# Patient Record
Sex: FEMALE | Race: BLACK OR AFRICAN AMERICAN | NOT HISPANIC OR LATINO | Employment: OTHER | URBAN - METROPOLITAN AREA
[De-identification: names, ages, dates, MRNs, and addresses within clinical notes are randomized per-mention and may not be internally consistent; named-entity substitution may affect disease eponyms.]

---

## 2018-06-04 ENCOUNTER — HOSPITAL ENCOUNTER (EMERGENCY)
Facility: HOSPITAL | Age: 53
Discharge: HOME | End: 2018-06-04
Attending: EMERGENCY MEDICINE | Admitting: EMERGENCY MEDICINE

## 2018-06-04 ENCOUNTER — APPOINTMENT (OUTPATIENT)
Dept: GENERAL RADIOLOGY | Facility: HOSPITAL | Age: 53
End: 2018-06-04

## 2018-06-04 VITALS
BODY MASS INDEX: 27.55 KG/M2 | HEART RATE: 86 BPM | WEIGHT: 186 LBS | TEMPERATURE: 98.7 F | RESPIRATION RATE: 18 BRPM | SYSTOLIC BLOOD PRESSURE: 170 MMHG | OXYGEN SATURATION: 97 % | DIASTOLIC BLOOD PRESSURE: 98 MMHG | HEIGHT: 69 IN

## 2018-06-04 DIAGNOSIS — I10 ESSENTIAL HYPERTENSION: Primary | ICD-10-CM

## 2018-06-04 DIAGNOSIS — F43.9 STRESS: ICD-10-CM

## 2018-06-04 DIAGNOSIS — R73.9 HYPERGLYCEMIA: ICD-10-CM

## 2018-06-04 LAB
ALBUMIN SERPL-MCNC: 4.2 G/DL (ref 3.2–4.8)
ALBUMIN/GLOB SERPL: 1.3 G/DL (ref 1.5–2.5)
ALP SERPL-CCNC: 113 U/L (ref 25–100)
ALT SERPL W P-5'-P-CCNC: 50 U/L (ref 7–40)
ANION GAP SERPL CALCULATED.3IONS-SCNC: 9 MMOL/L (ref 3–11)
AST SERPL-CCNC: 40 U/L (ref 0–33)
BACTERIA UR QL AUTO: NORMAL /HPF
BASOPHILS # BLD AUTO: 0.03 10*3/MM3 (ref 0–0.2)
BASOPHILS NFR BLD AUTO: 0.3 % (ref 0–1)
BILIRUB SERPL-MCNC: 0.8 MG/DL (ref 0.3–1.2)
BILIRUB UR QL STRIP: NEGATIVE
BUN BLD-MCNC: 8 MG/DL (ref 9–23)
BUN/CREAT SERPL: 11.4 (ref 7–25)
CALCIUM SPEC-SCNC: 9.2 MG/DL (ref 8.7–10.4)
CHLORIDE SERPL-SCNC: 100 MMOL/L (ref 99–109)
CLARITY UR: ABNORMAL
CO2 SERPL-SCNC: 28 MMOL/L (ref 20–31)
COLOR UR: YELLOW
CREAT BLD-MCNC: 0.7 MG/DL (ref 0.6–1.3)
DEPRECATED RDW RBC AUTO: 41.5 FL (ref 37–54)
EOSINOPHIL # BLD AUTO: 0.02 10*3/MM3 (ref 0–0.3)
EOSINOPHIL NFR BLD AUTO: 0.2 % (ref 0–3)
ERYTHROCYTE [DISTWIDTH] IN BLOOD BY AUTOMATED COUNT: 14.5 % (ref 11.3–14.5)
GFR SERPL CREATININE-BSD FRML MDRD: 106 ML/MIN/1.73
GLOBULIN UR ELPH-MCNC: 3.3 GM/DL
GLUCOSE BLD-MCNC: 327 MG/DL (ref 70–100)
GLUCOSE UR STRIP-MCNC: ABNORMAL MG/DL
HCT VFR BLD AUTO: 40.6 % (ref 34.5–44)
HGB BLD-MCNC: 12.6 G/DL (ref 11.5–15.5)
HGB UR QL STRIP.AUTO: NEGATIVE
HYALINE CASTS UR QL AUTO: NORMAL /LPF
IMM GRANULOCYTES # BLD: 0.02 10*3/MM3 (ref 0–0.03)
IMM GRANULOCYTES NFR BLD: 0.2 % (ref 0–0.6)
KETONES UR QL STRIP: NEGATIVE
LEUKOCYTE ESTERASE UR QL STRIP.AUTO: NEGATIVE
LYMPHOCYTES # BLD AUTO: 1.19 10*3/MM3 (ref 0.6–4.8)
LYMPHOCYTES NFR BLD AUTO: 12.3 % (ref 24–44)
MCH RBC QN AUTO: 24.7 PG (ref 27–31)
MCHC RBC AUTO-ENTMCNC: 31 G/DL (ref 32–36)
MCV RBC AUTO: 79.5 FL (ref 80–99)
MONOCYTES # BLD AUTO: 0.41 10*3/MM3 (ref 0–1)
MONOCYTES NFR BLD AUTO: 4.3 % (ref 0–12)
NEUTROPHILS # BLD AUTO: 7.99 10*3/MM3 (ref 1.5–8.3)
NEUTROPHILS NFR BLD AUTO: 82.9 % (ref 41–71)
NITRITE UR QL STRIP: NEGATIVE
PH UR STRIP.AUTO: 7 [PH] (ref 5–8)
PLATELET # BLD AUTO: 293 10*3/MM3 (ref 150–450)
PMV BLD AUTO: 11.1 FL (ref 6–12)
POTASSIUM BLD-SCNC: 4 MMOL/L (ref 3.5–5.5)
PROT SERPL-MCNC: 7.5 G/DL (ref 5.7–8.2)
PROT UR QL STRIP: NEGATIVE
RBC # BLD AUTO: 5.11 10*6/MM3 (ref 3.89–5.14)
RBC # UR: NORMAL /HPF
REF LAB TEST METHOD: NORMAL
SODIUM BLD-SCNC: 137 MMOL/L (ref 132–146)
SP GR UR STRIP: 1.01 (ref 1–1.03)
SQUAMOUS #/AREA URNS HPF: NORMAL /HPF
TROPONIN I SERPL-MCNC: 0 NG/ML (ref 0–0.07)
UROBILINOGEN UR QL STRIP: ABNORMAL
WBC NRBC COR # BLD: 9.64 10*3/MM3 (ref 3.5–10.8)
WBC UR QL AUTO: NORMAL /HPF

## 2018-06-04 PROCEDURE — 71045 X-RAY EXAM CHEST 1 VIEW: CPT

## 2018-06-04 PROCEDURE — 99284 EMERGENCY DEPT VISIT MOD MDM: CPT

## 2018-06-04 PROCEDURE — 84484 ASSAY OF TROPONIN QUANT: CPT

## 2018-06-04 PROCEDURE — 93005 ELECTROCARDIOGRAM TRACING: CPT | Performed by: EMERGENCY MEDICINE

## 2018-06-04 PROCEDURE — 85025 COMPLETE CBC W/AUTO DIFF WBC: CPT | Performed by: EMERGENCY MEDICINE

## 2018-06-04 PROCEDURE — 81001 URINALYSIS AUTO W/SCOPE: CPT | Performed by: EMERGENCY MEDICINE

## 2018-06-04 PROCEDURE — 80053 COMPREHEN METABOLIC PANEL: CPT | Performed by: EMERGENCY MEDICINE

## 2018-06-04 RX ORDER — AMLODIPINE BESYLATE 10 MG/1
10 TABLET ORAL DAILY
Qty: 14 TABLET | Refills: 0 | Status: SHIPPED | OUTPATIENT
Start: 2018-06-04 | End: 2018-06-15 | Stop reason: SDUPTHER

## 2018-06-04 NOTE — ED PROVIDER NOTES
"Subjective   Michelle Weaver is a 53 y.o.female who presents to the ED with complaints of hypertension since yesterday. The patient has past medical history of hypertension, and she reports that the last time she was normally taking blood pressure medication was 8-9 months ago. She no longer takes blood pressure medication due to significant weight loss which has been maintained, as well as improvements in diet and exercise. She reports that the last time she checked her blood pressure prior to yesterday was about 3 months ago, and it measured 138/80. The patient reports that yesterday she experienced a burning and tingling sensation in her face, which was resolved yesterday, and \"room-spinning\" dizziness when bending over, which was partially resolved with sitting down. She reports that when she woke up this morning, she once again experienced the same sensation in her face. Her daughter then measured her blood pressure, which was 184/104. She took 10 mg of Amlodipine at 0830 today from her previous prescription, which did not relieve her symptoms. The patient reports that she has been experiencing a lot of family stress recently and occasionally has panic attacks. Her anxiety is un-medicated.        History provided by:  Patient  Hypertension   Severity:  Moderate  Onset quality:  Sudden  Duration:  1 day  Timing:  Constant  Progression:  Unchanged  Chronicity:  Chronic (Used to take BP medication 8-9 months ago, but was stopped due to maintained weight loss and improved diet and exercise.)  Time since last dose of antihypertensive:  2 hours  Notable PTA blood pressures:  184/104  Context: stress    Relieved by:  Nothing  Worsened by:  Nothing  Ineffective treatments:  Ca channel blockers  Associated symptoms: dizziness    Associated symptoms: no abdominal pain, no chest pain, no fever and no weakness    Associated symptoms comment:  Burning sensation in her face    Risk factors comment:  Prior history of " hypertension      Review of Systems   Constitutional: Negative for chills and fever.   Cardiovascular: Negative for chest pain.        Hypertension   Gastrointestinal: Negative for abdominal pain.   Musculoskeletal: Negative for back pain.   Neurological: Positive for dizziness. Negative for weakness.        Burning and tingling sensation in her face   Psychiatric/Behavioral:        She reports lots of recent stress   All other systems reviewed and are negative.      History reviewed. No pertinent past medical history.    Allergies   Allergen Reactions   • Sulfa Antibiotics Rash       History reviewed. No pertinent surgical history.    History reviewed. No pertinent family history.    Social History     Social History   • Marital status:      Social History Main Topics   • Smoking status: Never Smoker   • Alcohol use No   • Drug use: No     Other Topics Concern   • Not on file         Objective   Physical Exam   Constitutional: She is oriented to person, place, and time. She appears well-developed and well-nourished. No distress.   HENT:   Head: Normocephalic and atraumatic.   Eyes: Conjunctivae are normal. No scleral icterus.   Neck: Normal range of motion. Neck supple.   Cardiovascular: Normal rate, regular rhythm and normal heart sounds.    No murmur heard.  Pulmonary/Chest: Effort normal and breath sounds normal. No respiratory distress.   Abdominal: Soft. Bowel sounds are normal. There is no tenderness.   Musculoskeletal: Normal range of motion. She exhibits no edema or tenderness.   No lower extremity edema. Equal calf size.   Neurological: She is alert and oriented to person, place, and time. No cranial nerve deficit.   Cranial nerves normal and intact. Non-focal neuro exam.   Skin: Skin is warm and dry.   Psychiatric: She has a normal mood and affect. Her behavior is normal.   Nursing note and vitals reviewed.      Procedures         ED Course     Recent Results (from the past 24 hour(s))    Comprehensive Metabolic Panel    Collection Time: 06/04/18 11:12 AM   Result Value Ref Range    Glucose 327 (H) 70 - 100 mg/dL    BUN 8 (L) 9 - 23 mg/dL    Creatinine 0.70 0.60 - 1.30 mg/dL    Sodium 137 132 - 146 mmol/L    Potassium 4.0 3.5 - 5.5 mmol/L    Chloride 100 99 - 109 mmol/L    CO2 28.0 20.0 - 31.0 mmol/L    Calcium 9.2 8.7 - 10.4 mg/dL    Total Protein 7.5 5.7 - 8.2 g/dL    Albumin 4.20 3.20 - 4.80 g/dL    ALT (SGPT) 50 (H) 7 - 40 U/L    AST (SGOT) 40 (H) 0 - 33 U/L    Alkaline Phosphatase 113 (H) 25 - 100 U/L    Total Bilirubin 0.8 0.3 - 1.2 mg/dL    eGFR  African Amer 106 >60 mL/min/1.73    Globulin 3.3 gm/dL    A/G Ratio 1.3 (L) 1.5 - 2.5 g/dL    BUN/Creatinine Ratio 11.4 7.0 - 25.0    Anion Gap 9.0 3.0 - 11.0 mmol/L   CBC Auto Differential    Collection Time: 06/04/18 11:12 AM   Result Value Ref Range    WBC 9.64 3.50 - 10.80 10*3/mm3    RBC 5.11 3.89 - 5.14 10*6/mm3    Hemoglobin 12.6 11.5 - 15.5 g/dL    Hematocrit 40.6 34.5 - 44.0 %    MCV 79.5 (L) 80.0 - 99.0 fL    MCH 24.7 (L) 27.0 - 31.0 pg    MCHC 31.0 (L) 32.0 - 36.0 g/dL    RDW 14.5 11.3 - 14.5 %    RDW-SD 41.5 37.0 - 54.0 fl    MPV 11.1 6.0 - 12.0 fL    Platelets 293 150 - 450 10*3/mm3    Neutrophil % 82.9 (H) 41.0 - 71.0 %    Lymphocyte % 12.3 (L) 24.0 - 44.0 %    Monocyte % 4.3 0.0 - 12.0 %    Eosinophil % 0.2 0.0 - 3.0 %    Basophil % 0.3 0.0 - 1.0 %    Immature Grans % 0.2 0.0 - 0.6 %    Neutrophils, Absolute 7.99 1.50 - 8.30 10*3/mm3    Lymphocytes, Absolute 1.19 0.60 - 4.80 10*3/mm3    Monocytes, Absolute 0.41 0.00 - 1.00 10*3/mm3    Eosinophils, Absolute 0.02 0.00 - 0.30 10*3/mm3    Basophils, Absolute 0.03 0.00 - 0.20 10*3/mm3    Immature Grans, Absolute 0.02 0.00 - 0.03 10*3/mm3   POC Troponin, Rapid    Collection Time: 06/04/18 11:22 AM   Result Value Ref Range    Troponin I 0.00 0.00 - 0.07 ng/mL   Urinalysis With / Microscopic If Indicated (No Culture) - Urine, Clean Catch    Collection Time: 06/04/18 12:00 PM   Result Value  Ref Range    Color, UA Yellow Yellow, Straw    Appearance, UA Cloudy (A) Clear    pH, UA 7.0 5.0 - 8.0    Specific Gravity, UA 1.011 1.001 - 1.030    Glucose, UA >=1000 mg/dL (3+) (A) Negative    Ketones, UA Negative Negative    Bilirubin, UA Negative Negative    Blood, UA Negative Negative    Protein, UA Negative Negative    Leuk Esterase, UA Negative Negative    Nitrite, UA Negative Negative    Urobilinogen, UA 0.2 E.U./dL 0.2 - 1.0 E.U./dL   Urinalysis, Microscopic Only - Urine, Clean Catch    Collection Time: 06/04/18 12:00 PM   Result Value Ref Range    RBC, UA 0-2 None Seen, 0-2 /HPF    WBC, UA 0-2 None Seen, 0-2 /HPF    Bacteria, UA None Seen None Seen, Trace /HPF    Squamous Epithelial Cells, UA 0-2 None Seen, 0-2 /HPF    Hyaline Casts, UA None Seen 0 - 6 /LPF    Methodology Automated Microscopy      Note: In addition to lab results from this visit, the labs listed above may include labs taken at another facility or during a different encounter within the last 24 hours. Please correlate lab times with ED admission and discharge times for further clarification of the services performed during this visit.    XR Chest 1 View   Preliminary Result   No acute cardiopulmonary disease.       D:  06/04/2018   E:  06/04/2018                Vitals:    06/04/18 0940 06/04/18 0945 06/04/18 1110 06/04/18 1145   BP: (!) 185/115 (!) 190/94 162/97 170/98   BP Location:       Patient Position:       Pulse:       Resp:       Temp:       TempSrc:       SpO2: 98% 96% 99% 97%   Weight:       Height:         Medications - No data to display  ECG/EMG Results (last 24 hours)     Procedure Component Value Units Date/Time    ECG 12 Lead [[id number]] Collected:  06/04/18 1105     Updated:  06/04/18 1108                        MDM  Number of Diagnoses or Management Options  Essential hypertension: new and requires workup  Hyperglycemia: new and requires workup  Stress: new and requires workup  Diagnosis management comments: Symptoms of  dizziness and facial tingling are resolved upon arrival here, and remained resolved.  Patient has a normal intact neurologic exam, no deficits present.    Does admit to a known history of hypertension, however has not been taking medications for 8 or 9 months, and does admit to significantly increased stressors recently.  She feels this is contributing to her symptoms and her elevated blood pressure.  She also admits to having right coat syndrome in the past.     was observed here in ER, with the medication she took prior to arrival blood pressure has continued to improve.  She has remained astigmatic and well-appearing.    There is no signs of end organ damage on the patient's laboratory EKG or imaging evaluation.    Patient does have high blood sugar on evaluation, we'll initiate the patient on a prescription for amlodipine which she has previous taken, and a prescription for metformin.    Patient will be referred and advised to obtain primary care follow-up within the next week.       Amount and/or Complexity of Data Reviewed  Clinical lab tests: ordered and reviewed  Tests in the radiology section of CPT®: ordered and reviewed  Obtain history from someone other than the patient: yes  Review and summarize past medical records: yes  Independent visualization of images, tracings, or specimens: yes    Patient Progress  Patient progress: stable      Final diagnoses:   Essential hypertension   Hyperglycemia   Stress       Documentation assistance provided by ramses Hernandez.  Information recorded by the scribe was done at my direction and has been verified and validated by me.     Naveed Hernandez  06/04/18 1225       Eliud Singh  06/04/18 1233       Shola Ulloa MD  06/04/18 7102

## 2018-06-04 NOTE — DISCHARGE INSTRUCTIONS
Follow up with one of the [facility] physician groups below to setup primary care. If you have trouble following up, please call Rabia Lozoya, our transitional care nurse, at [phone].    (Dr. Thomason, Dr. Lee, Dr. Gonzalez, and Dr. Jordan.)  [facility], Primary Care, [phone], [address], [place] [address]    [facility], Primary Care, [phone], [address], [address] [place], [address] [facility]) [facility], Primary Care, [phone], [address], [address] [place], [address] [facility], Primary Care, [phone], [address], [address] [place], [address]     [place] [address] [facility], Primary Care, [phone], [address], [address] [place], [address]    [place] [address] [facility], Primary Care, [phone], [address]    [place], [address] [facility], Primary Care, [phone], [address], [address] [place], [address] [facility], Primary Care, [phone], [address], [address] [place], [address] [facility], Primary Care, [phone], [address] [place], [address] [facility], Primary Care, [phone], [address], [address] [place], [address] [facility], Primary Care, [phone], [address], [place], [address]     [facility], Primary Care, [phone], [address] [id number]

## 2019-01-03 RX ORDER — AMLODIPINE BESYLATE 10 MG/1
10 TABLET ORAL DAILY
Qty: 30 TABLET | Refills: 0 | Status: SHIPPED | OUTPATIENT
Start: 2019-01-03 | End: 2022-09-08 | Stop reason: HOSPADM

## 2022-09-05 ENCOUNTER — APPOINTMENT (OUTPATIENT)
Dept: GENERAL RADIOLOGY | Facility: HOSPITAL | Age: 57
End: 2022-09-05

## 2022-09-05 ENCOUNTER — HOSPITAL ENCOUNTER (INPATIENT)
Facility: HOSPITAL | Age: 57
LOS: 3 days | Discharge: HOME | End: 2022-09-08
Attending: EMERGENCY MEDICINE | Admitting: INTERNAL MEDICINE

## 2022-09-05 DIAGNOSIS — L02.416 ABSCESS OF LEFT LEG: ICD-10-CM

## 2022-09-05 DIAGNOSIS — L03.116 CELLULITIS OF LEFT LEG: Primary | ICD-10-CM

## 2022-09-05 DIAGNOSIS — L97.529 SKIN ULCER OF LEFT GREAT TOE, UNSPECIFIED ULCER STAGE: ICD-10-CM

## 2022-09-05 DIAGNOSIS — R73.9 HYPERGLYCEMIA: ICD-10-CM

## 2022-09-05 PROBLEM — I10 PRIMARY HYPERTENSION: Chronic | Status: ACTIVE | Noted: 2022-09-05

## 2022-09-05 PROBLEM — L97.509 TOE ULCER: Status: ACTIVE | Noted: 2022-09-05

## 2022-09-05 PROBLEM — F41.9 ANXIETY: Chronic | Status: ACTIVE | Noted: 2022-09-05

## 2022-09-05 PROBLEM — F41.0 PANIC ATTACKS: Chronic | Status: ACTIVE | Noted: 2022-09-05

## 2022-09-05 LAB
ALBUMIN SERPL-MCNC: 3.6 G/DL (ref 3.5–5.2)
ALBUMIN/GLOB SERPL: 0.8 G/DL
ALP SERPL-CCNC: 114 U/L (ref 39–117)
ALT SERPL W P-5'-P-CCNC: 9 U/L (ref 1–33)
ANION GAP SERPL CALCULATED.3IONS-SCNC: 14 MMOL/L (ref 5–15)
AST SERPL-CCNC: 14 U/L (ref 1–32)
BASOPHILS # BLD AUTO: 0.06 10*3/MM3 (ref 0–0.2)
BASOPHILS NFR BLD AUTO: 0.5 % (ref 0–1.5)
BILIRUB SERPL-MCNC: 0.9 MG/DL (ref 0–1.2)
BUN SERPL-MCNC: 16 MG/DL (ref 6–20)
BUN/CREAT SERPL: 16 (ref 7–25)
CALCIUM SPEC-SCNC: 9.5 MG/DL (ref 8.6–10.5)
CHLORIDE SERPL-SCNC: 82 MMOL/L (ref 98–107)
CO2 SERPL-SCNC: 26 MMOL/L (ref 22–29)
CREAT SERPL-MCNC: 1 MG/DL (ref 0.57–1)
CRP SERPL-MCNC: 15.86 MG/DL (ref 0–0.5)
D-LACTATE SERPL-SCNC: 1.7 MMOL/L (ref 0.5–2)
DEPRECATED RDW RBC AUTO: 37.4 FL (ref 37–54)
EGFRCR SERPLBLD CKD-EPI 2021: 65.4 ML/MIN/1.73
EOSINOPHIL # BLD AUTO: 0.19 10*3/MM3 (ref 0–0.4)
EOSINOPHIL NFR BLD AUTO: 1.4 % (ref 0.3–6.2)
ERYTHROCYTE [DISTWIDTH] IN BLOOD BY AUTOMATED COUNT: 12.5 % (ref 12.3–15.4)
ERYTHROCYTE [SEDIMENTATION RATE] IN BLOOD: 103 MM/HR (ref 0–30)
GLOBULIN UR ELPH-MCNC: 4.7 GM/DL
GLUCOSE BLDC GLUCOMTR-MCNC: 364 MG/DL (ref 70–130)
GLUCOSE SERPL-MCNC: 334 MG/DL (ref 65–99)
HBA1C MFR BLD: 13.5 % (ref 4.8–5.6)
HCT VFR BLD AUTO: 39.9 % (ref 34–46.6)
HGB BLD-MCNC: 13.5 G/DL (ref 12–15.9)
IMM GRANULOCYTES # BLD AUTO: 0.17 10*3/MM3 (ref 0–0.05)
IMM GRANULOCYTES NFR BLD AUTO: 1.3 % (ref 0–0.5)
LYMPHOCYTES # BLD AUTO: 2.77 10*3/MM3 (ref 0.7–3.1)
LYMPHOCYTES NFR BLD AUTO: 20.8 % (ref 19.6–45.3)
MCH RBC QN AUTO: 27.9 PG (ref 26.6–33)
MCHC RBC AUTO-ENTMCNC: 33.8 G/DL (ref 31.5–35.7)
MCV RBC AUTO: 82.4 FL (ref 79–97)
MONOCYTES # BLD AUTO: 0.72 10*3/MM3 (ref 0.1–0.9)
MONOCYTES NFR BLD AUTO: 5.4 % (ref 5–12)
NEUTROPHILS NFR BLD AUTO: 70.6 % (ref 42.7–76)
NEUTROPHILS NFR BLD AUTO: 9.42 10*3/MM3 (ref 1.7–7)
NRBC BLD AUTO-RTO: 0 /100 WBC (ref 0–0.2)
PLATELET # BLD AUTO: 416 10*3/MM3 (ref 140–450)
PMV BLD AUTO: 11.2 FL (ref 6–12)
POTASSIUM SERPL-SCNC: 3.5 MMOL/L (ref 3.5–5.2)
PROCALCITONIN SERPL-MCNC: 0.14 NG/ML (ref 0–0.25)
PROT SERPL-MCNC: 8.3 G/DL (ref 6–8.5)
RBC # BLD AUTO: 4.84 10*6/MM3 (ref 3.77–5.28)
SODIUM SERPL-SCNC: 122 MMOL/L (ref 136–145)
VANCOMYCIN SERPL-MCNC: 25.4 MCG/ML (ref 5–40)
WBC NRBC COR # BLD: 13.33 10*3/MM3 (ref 3.4–10.8)

## 2022-09-05 PROCEDURE — 73630 X-RAY EXAM OF FOOT: CPT

## 2022-09-05 PROCEDURE — 80202 ASSAY OF VANCOMYCIN: CPT

## 2022-09-05 PROCEDURE — 87040 BLOOD CULTURE FOR BACTERIA: CPT | Performed by: NURSE PRACTITIONER

## 2022-09-05 PROCEDURE — 25010000002 PIPERACILLIN SOD-TAZOBACTAM PER 1 G: Performed by: NURSE PRACTITIONER

## 2022-09-05 PROCEDURE — 87147 CULTURE TYPE IMMUNOLOGIC: CPT | Performed by: NURSE PRACTITIONER

## 2022-09-05 PROCEDURE — 85025 COMPLETE CBC W/AUTO DIFF WBC: CPT | Performed by: NURSE PRACTITIONER

## 2022-09-05 PROCEDURE — 82962 GLUCOSE BLOOD TEST: CPT

## 2022-09-05 PROCEDURE — 99223 1ST HOSP IP/OBS HIGH 75: CPT | Performed by: INTERNAL MEDICINE

## 2022-09-05 PROCEDURE — 25010000002 HYDROMORPHONE PER 4 MG: Performed by: EMERGENCY MEDICINE

## 2022-09-05 PROCEDURE — 87186 SC STD MICRODIL/AGAR DIL: CPT | Performed by: NURSE PRACTITIONER

## 2022-09-05 PROCEDURE — 80053 COMPREHEN METABOLIC PANEL: CPT | Performed by: NURSE PRACTITIONER

## 2022-09-05 PROCEDURE — 86140 C-REACTIVE PROTEIN: CPT | Performed by: INTERNAL MEDICINE

## 2022-09-05 PROCEDURE — 85652 RBC SED RATE AUTOMATED: CPT | Performed by: INTERNAL MEDICINE

## 2022-09-05 PROCEDURE — 73590 X-RAY EXAM OF LOWER LEG: CPT

## 2022-09-05 PROCEDURE — 87205 SMEAR GRAM STAIN: CPT | Performed by: NURSE PRACTITIONER

## 2022-09-05 PROCEDURE — 0H9LXZZ DRAINAGE OF LEFT LOWER LEG SKIN, EXTERNAL APPROACH: ICD-10-PCS | Performed by: EMERGENCY MEDICINE

## 2022-09-05 PROCEDURE — 87077 CULTURE AEROBIC IDENTIFY: CPT | Performed by: NURSE PRACTITIONER

## 2022-09-05 PROCEDURE — 25010000002 VANCOMYCIN 10 G RECONSTITUTED SOLUTION: Performed by: NURSE PRACTITIONER

## 2022-09-05 PROCEDURE — 25010000002 ENOXAPARIN PER 10 MG: Performed by: NURSE PRACTITIONER

## 2022-09-05 PROCEDURE — 99284 EMERGENCY DEPT VISIT MOD MDM: CPT

## 2022-09-05 PROCEDURE — 83605 ASSAY OF LACTIC ACID: CPT | Performed by: NURSE PRACTITIONER

## 2022-09-05 PROCEDURE — 25010000002 ONDANSETRON PER 1 MG: Performed by: EMERGENCY MEDICINE

## 2022-09-05 PROCEDURE — 84145 PROCALCITONIN (PCT): CPT | Performed by: NURSE PRACTITIONER

## 2022-09-05 PROCEDURE — 87070 CULTURE OTHR SPECIMN AEROBIC: CPT | Performed by: NURSE PRACTITIONER

## 2022-09-05 PROCEDURE — 63710000001 INSULIN DETEMIR PER 5 UNITS: Performed by: INTERNAL MEDICINE

## 2022-09-05 PROCEDURE — 83036 HEMOGLOBIN GLYCOSYLATED A1C: CPT | Performed by: INTERNAL MEDICINE

## 2022-09-05 RX ORDER — BISACODYL 10 MG
10 SUPPOSITORY, RECTAL RECTAL DAILY PRN
Status: DISCONTINUED | OUTPATIENT
Start: 2022-09-05 | End: 2022-09-08 | Stop reason: HOSPADM

## 2022-09-05 RX ORDER — NICOTINE POLACRILEX 4 MG
15 LOZENGE BUCCAL
Status: DISCONTINUED | OUTPATIENT
Start: 2022-09-05 | End: 2022-09-06 | Stop reason: SDUPTHER

## 2022-09-05 RX ORDER — SODIUM CHLORIDE 0.9 % (FLUSH) 0.9 %
10 SYRINGE (ML) INJECTION AS NEEDED
Status: DISCONTINUED | OUTPATIENT
Start: 2022-09-05 | End: 2022-09-08 | Stop reason: HOSPADM

## 2022-09-05 RX ORDER — BISACODYL 5 MG/1
5 TABLET, DELAYED RELEASE ORAL DAILY PRN
Status: DISCONTINUED | OUTPATIENT
Start: 2022-09-05 | End: 2022-09-08 | Stop reason: HOSPADM

## 2022-09-05 RX ORDER — AMOXICILLIN 250 MG
2 CAPSULE ORAL 2 TIMES DAILY
Status: DISCONTINUED | OUTPATIENT
Start: 2022-09-05 | End: 2022-09-08 | Stop reason: HOSPADM

## 2022-09-05 RX ORDER — ENOXAPARIN SODIUM 100 MG/ML
40 INJECTION SUBCUTANEOUS DAILY
Status: DISCONTINUED | OUTPATIENT
Start: 2022-09-05 | End: 2022-09-08 | Stop reason: HOSPADM

## 2022-09-05 RX ORDER — SODIUM CHLORIDE 0.9 % (FLUSH) 0.9 %
10 SYRINGE (ML) INJECTION EVERY 12 HOURS SCHEDULED
Status: DISCONTINUED | OUTPATIENT
Start: 2022-09-05 | End: 2022-09-08 | Stop reason: HOSPADM

## 2022-09-05 RX ORDER — SODIUM CHLORIDE 9 MG/ML
100 INJECTION, SOLUTION INTRAVENOUS CONTINUOUS
Status: DISCONTINUED | OUTPATIENT
Start: 2022-09-05 | End: 2022-09-06

## 2022-09-05 RX ORDER — ACETAMINOPHEN 650 MG/1
650 SUPPOSITORY RECTAL EVERY 4 HOURS PRN
Status: DISCONTINUED | OUTPATIENT
Start: 2022-09-05 | End: 2022-09-08 | Stop reason: HOSPADM

## 2022-09-05 RX ORDER — NALOXONE HCL 0.4 MG/ML
0.4 VIAL (ML) INJECTION
Status: DISCONTINUED | OUTPATIENT
Start: 2022-09-05 | End: 2022-09-08 | Stop reason: HOSPADM

## 2022-09-05 RX ORDER — ONDANSETRON 2 MG/ML
4 INJECTION INTRAMUSCULAR; INTRAVENOUS ONCE
Status: COMPLETED | OUTPATIENT
Start: 2022-09-05 | End: 2022-09-05

## 2022-09-05 RX ORDER — INSULIN LISPRO 100 [IU]/ML
0-7 INJECTION, SOLUTION INTRAVENOUS; SUBCUTANEOUS
Status: DISCONTINUED | OUTPATIENT
Start: 2022-09-06 | End: 2022-09-06

## 2022-09-05 RX ORDER — POLYETHYLENE GLYCOL 3350 17 G/17G
17 POWDER, FOR SOLUTION ORAL DAILY PRN
Status: DISCONTINUED | OUTPATIENT
Start: 2022-09-05 | End: 2022-09-08 | Stop reason: HOSPADM

## 2022-09-05 RX ORDER — HYDROCODONE BITARTRATE AND ACETAMINOPHEN 5; 325 MG/1; MG/1
1 TABLET ORAL EVERY 4 HOURS PRN
Status: DISCONTINUED | OUTPATIENT
Start: 2022-09-05 | End: 2022-09-08 | Stop reason: HOSPADM

## 2022-09-05 RX ORDER — DEXTROSE MONOHYDRATE 25 G/50ML
25 INJECTION, SOLUTION INTRAVENOUS
Status: DISCONTINUED | OUTPATIENT
Start: 2022-09-05 | End: 2022-09-06 | Stop reason: SDUPTHER

## 2022-09-05 RX ORDER — HYDROMORPHONE HYDROCHLORIDE 1 MG/ML
0.5 INJECTION, SOLUTION INTRAMUSCULAR; INTRAVENOUS; SUBCUTANEOUS
Status: DISCONTINUED | OUTPATIENT
Start: 2022-09-05 | End: 2022-09-08 | Stop reason: HOSPADM

## 2022-09-05 RX ORDER — ACETAMINOPHEN 160 MG/5ML
650 SOLUTION ORAL EVERY 4 HOURS PRN
Status: DISCONTINUED | OUTPATIENT
Start: 2022-09-05 | End: 2022-09-08 | Stop reason: HOSPADM

## 2022-09-05 RX ORDER — ACETAMINOPHEN 325 MG/1
650 TABLET ORAL EVERY 4 HOURS PRN
Status: DISCONTINUED | OUTPATIENT
Start: 2022-09-05 | End: 2022-09-08 | Stop reason: HOSPADM

## 2022-09-05 RX ORDER — HYDROMORPHONE HYDROCHLORIDE 1 MG/ML
0.5 INJECTION, SOLUTION INTRAMUSCULAR; INTRAVENOUS; SUBCUTANEOUS ONCE
Status: COMPLETED | OUTPATIENT
Start: 2022-09-05 | End: 2022-09-05

## 2022-09-05 RX ADMIN — Medication 3 ML: at 15:38

## 2022-09-05 RX ADMIN — TAZOBACTAM SODIUM AND PIPERACILLIN SODIUM 3.38 G: 375; 3 INJECTION, SOLUTION INTRAVENOUS at 22:42

## 2022-09-05 RX ADMIN — ONDANSETRON 4 MG: 2 INJECTION INTRAMUSCULAR; INTRAVENOUS at 15:20

## 2022-09-05 RX ADMIN — HYDROMORPHONE HYDROCHLORIDE 0.5 MG: 1 INJECTION, SOLUTION INTRAMUSCULAR; INTRAVENOUS; SUBCUTANEOUS at 15:20

## 2022-09-05 RX ADMIN — TAZOBACTAM SODIUM AND PIPERACILLIN SODIUM 3.38 G: 375; 3 INJECTION, SOLUTION INTRAVENOUS at 15:30

## 2022-09-05 RX ADMIN — ENOXAPARIN SODIUM 40 MG: 40 INJECTION SUBCUTANEOUS at 21:59

## 2022-09-05 RX ADMIN — INSULIN DETEMIR 5 UNITS: 100 INJECTION, SOLUTION SUBCUTANEOUS at 21:57

## 2022-09-05 RX ADMIN — SODIUM CHLORIDE 100 ML/HR: 9 INJECTION, SOLUTION INTRAVENOUS at 22:45

## 2022-09-05 RX ADMIN — SODIUM CHLORIDE 1000 ML: 9 INJECTION, SOLUTION INTRAVENOUS at 15:20

## 2022-09-05 RX ADMIN — VANCOMYCIN HYDROCHLORIDE 1750 MG: 10 INJECTION, POWDER, LYOPHILIZED, FOR SOLUTION INTRAVENOUS at 16:29

## 2022-09-05 NOTE — H&P
[facility] Medicine Services  HISTORY AND PHYSICAL    Patient Name: Michelle Weaver  : 1965  MRN: [id number]  Primary Care Physician: Provider, No Known  Date of admission: 2022    Subjective   Subjective     Chief Complaint:  LLE cellulitis    HPI:  Michelle Weaver is a 57 y.o. female with past medical history significant for anxiety, panic attacks (not requiring medication), hypertension, and overweight.  Patient presents to ER with a 4-day history of left lower extremity pain, erythema, and ulceration.  Patient reports she has been working out and running to try and lose weight.  Developed a small blister that developed into an ulceration on the bottom of her left great toe approximately April or May of this year.  Slow healing.  After working out on Wednesday of last week her legs were aching.  Reports that  she applied essential oils to a spot on her left lower shin/leg and a heating pad and fell asleep.  Awoken a few hours later with a small red spot and a small blister.  Over the last several days the blister has gotten larger and now has significant erythema, edema, and tenderness.  On ER eval noted to have elevated white count and sed rate.  X-rays pending.  Left shin I&D and blood cultures are pending.  Diagnosed with left lower extremity cellulitis and abscess. Started on vancomycin and Zosyn.  Dehydrated and started on IV fluids.  No history of diabetes mellitus however glucose 334.  Likely new diagnosis, awaiting A1c.  Will admit to hospitalist, consult ID, continue antibiotic therapy.        Review of Systems   Constitutional: Positive for activity change, appetite change and fatigue. Negative for chills and fever.   HENT: Negative.    Eyes: Negative.    Respiratory: Negative.    Cardiovascular: Positive for leg swelling.   Gastrointestinal: Negative.    Endocrine: Negative.    Genitourinary: Negative.    Musculoskeletal: Positive for gait problem.    Skin: Positive for wound.   Allergic/Immunologic: Negative.    Neurological: Positive for tremors. Negative for seizures, speech difficulty and headaches.   Hematological: Negative.    Psychiatric/Behavioral: Negative.         All other systems reviewed and are negative.     Personal History     Past Medical History:   Diagnosis Date   • Anxiety 9/5/2022   • Panic attacks 9/5/2022   • Primary hypertension 9/5/2022             Past Surgical History:   Procedure Laterality Date   • BREAST CYST EXCISION Right     approx 2017       Family History:  family history includes Cancer in her mother; No Known Problems in her daughter, daughter, daughter, and daughter. Otherwise pertinent FHx was reviewed and unremarkable.     Social History:  reports that she has never smoked. She has never used smokeless tobacco. She reports that she does not drink alcohol and does not use drugs.  Social History     Social History Narrative   • Not on file       Medications:  amLODIPine, carvedilol, chlorthalidone, and erythromycin    Allergies   Allergen Reactions   • Sulfa Antibiotics Rash       Objective   Objective     Vital Signs:   Temp:  [98.3 °F (36.8 °C)] 98.3 °F (36.8 °C)  Heart Rate:  [83-91] 83  Resp:  [16] 16  BP: (142-178)/(82-96) 142/82    Physical Exam   Constitutional: Awake, alert.  Resting upright in chair in ER.  No acute distress.  Eyes: PERRLA, sclerae anicteric, no conjunctival injection  HENT: NCAT, mucous membranes moist  Neck: Supple, no thyromegaly, no lymphadenopathy, trachea midline  Respiratory: Clear to auscultation bilaterally, nonlabored respirations on room air.  Sats 97%.  Cardiovascular: RRR, no murmurs, rubs, or gallops, palpable pedal pulses bilaterally  Gastrointestinal: Positive bowel sounds, soft, nontender, nondistended  Musculoskeletal: Coleman spontaneously.  No clubbing or cyanosis to extremities  Psychiatric: Appropriate affect, cooperative  Neurologic: Oriented x 3, strength symmetric in all  extremities, Cranial Nerves grossly intact to confrontation, speech clear  Skin: Left plantar surface of great toe with approximate 2 cm circular ulceration.  Also noted lateral shin with larger irregularly shaped approximate 2 inch ulceration with foul-smelling odor and purulent drainage.  Associated moderate edema, erythema, and tenderness to palpation from ankle to about mid shin.      Results Reviewed:  I have personally reviewed most recent indicated data and agree with findings including:  [x]  Laboratory  [x]  Radiology  []  EKG/Telemetry  []  Pathology  []  Cardiac/Vascular Studies  [x]  Old records  []  Other:  Most pertinent findings include:      LAB RESULTS:      Lab 09/05/22  1511   WBC 13.33*   HEMOGLOBIN 13.5   HEMATOCRIT 39.9   PLATELETS 416   NEUTROS ABS 9.42*   IMMATURE GRANS (ABS) 0.17*   LYMPHS ABS 2.77   MONOS ABS 0.72   EOS ABS 0.19   MCV 82.4   SED RATE 103*   CRP 15.86*   PROCALCITONIN 0.14   LACTATE 1.7         Lab 09/05/22  1511   SODIUM 122*   POTASSIUM 3.5   CHLORIDE 82*   CO2 26.0   ANION GAP 14.0   BUN 16   CREATININE 1.00   EGFR 65.4   GLUCOSE 334*   CALCIUM 9.5   HEMOGLOBIN A1C 13.50*         Lab 09/05/22  1511   TOTAL PROTEIN 8.3   ALBUMIN 3.60   GLOBULIN 4.7   ALT (SGPT) 9   AST (SGOT) 14   BILIRUBIN 0.9   ALK PHOS 114                     Brief Urine Lab Results     None        Microbiology Results (last 10 days)     ** No results found for the last 240 hours. **          XR Tibia Fibula 2 View Left    Result Date: 9/5/2022  DATE OF EXAM: 9/5/2022 2:25 PM  PROCEDURE: XR TIBIA FIBULA 2 VW LEFT-  INDICATIONS: cellulitis with large abscess / blister to medial ankle  COMPARISON: No comparisons available.  TECHNIQUE: 4 images of the left tibia and fibula  FINDINGS: There is a protuberant 4.5 cm rounded soft tissue density at the medial distal leg likely reflecting reported blister. There is subcutaneous fat stranding along the medial mid and distal leg near the blister. Otherwise  "unremarkable appearance of the soft tissues. No soft tissue gas. No acute osseous findings. No bony erosion or periostitis.      Impression: There is a protuberant 4.5 cm rounded soft tissue density at the medial distal leg likely reflecting reported blister. There is subcutaneous fat stranding along the medial mid and distal leg near the blister. Otherwise unremarkable appearance of the soft tissues. No soft tissue gas. No acute osseous findings. No bony erosion or periostitis.  This report was finalized on 9/5/2022 3:16 PM by Caleb Alonso MD.            Assessment & Plan   Assessment & Plan       Cellulitis of left leg    Toe ulcer (HCC)    Anxiety    Panic attacks    Primary hypertension    Michelle Weaver is a 57-year-old female who presents with 4-day history of left lower extremity pain, swelling, and ulceration after use of essential oil and heating pad and fell asleep.  Developed blister which is worsened.  Found to have LLE cellulitis/abscess.  Also ulceration to left plantar surface of great toe.  Will admit to hospitalist for further management.    Assessment/plan:    Note--- patient wishes to be a \"privacy patient\".  Registration to list as privacy case.  She wishes for no family in the room while being examined or any medical discussions underway including  or children.  She states that anything they need to know she will tell them.    LLE shin wound cellulitis  LLE Abscess s/p I&D in ED  Lt great toe ulceration  --Toe ulceration developed in April or May of this year from \"rubbing from shoe\".  Nonhealing  -- LLE shin/lateral lower leg ulceration developed Thursday 9/1/2022.  Applied essential oils and heating pad to a \"sore spot on her left lower leg.  Fell asleep and awoke with blister that has since worsened.  -- X-ray left foot and left tib-fib pending.  -- Consider MRI  -- ID consult for antibiotic management  -- Wound and blood cultures pending  -- Continue IV vancomycin and Zosyn  -- " Monitor labs    Diabetes Mellitus - new diagnosis A1c 13.5  -- No known history of diabetes.  Reports family history of diabetes.  -- Glucose 334 on presentation with slowed wound healing.  -- A1c 13.5, this is a new diagnosis DM type II  -- Consult dietitian  -- Consult diabetes educator in AM  -- Start Accu-Chek AC at bedtime and LD SSI for now.  Adjust as needed.  --Will likely need insulin at discharge and she is aware of this/agreeable to learn.    Dehydration  -- Reports she has not been eating or drinking much for the last 2 weeks while grieving the death of her brother.  -- IV hydration  -- Monitor labs    Anxiety/panic attacks  Grief/Death of brother 2 wks ago   -- Reports no current medications, stable    HTN  -- Continue home BP meds and monitor    DVT prophylaxis: RLE sequential, Lovenox    CODE STATUS:    Level Of Support Discussed With: Patient  Code Status (Patient has no pulse and is not breathing): CPR (Attempt to Resuscitate)  Medical Interventions (Patient has pulse or is breathing): Full Support      This note has been completed as part of a split-shared workflow.     Signature: Electronically signed by DARLENE Torres, 09/05/22, 6:35 PM EDT      Attending   Admission Attestation       I have performed an independent face-to-face diagnostic evaluation including performing an independent physical examination as documented here.  The documented plan of care above was reviewed and developed with the advanced practice clinician (APC).      Brief Summary Statement:   Michelle Weaver is a 57 y.o. female with PMHx significant for anxiety, panic attacks, HTN, obesity who presents to the ED with complaints of LLE pain. Patient reports she has been working out trying to lose weight. She notes some soreness in her ankles, she rubbed some essential oil on and put a heated pad over it and fell asleep. When she woke up she noticed a small area of redness that progressively worsened and turned  into a blister. The area began to get increasingly painful, more erythematous and indurated. In the ED found to have a large LLE abscess that was I&D by ED provider. Cultures are pending. Patient notes no prior hx of diabetes, however blood sugar >300 on arrival. She says she has been trying to cut back on her carb intake. She also reports she had a small ulceration over the left great toe that has been present for many months. She reports normal sensation in her toes.    Remainder of detailed HPI is as noted by APC and has been reviewed and/or edited by me for completeness.    Attending Physical Exam:  Temp:  [97.9 °F (36.6 °C)-98.3 °F (36.8 °C)] 97.9 °F (36.6 °C)  Heart Rate:  [81-91] 81  Resp:  [16] 16  BP: (142-178)/(75-96) 148/75    Constitutional: Awake, alert  Eyes: PERRLA, sclerae anicteric, no conjunctival injection  HENT: NCAT, mucous membranes moist  Neck: Supple, no thyromegaly, no lymphadenopathy, trachea midline  Respiratory: Clear to auscultation bilaterally, nonlabored respirations   Cardiovascular: RRR, no murmurs, rubs, or gallops, palpable pedal pulses bilaterally  Gastrointestinal: Positive bowel sounds, soft, nontender, nondistended  Musculoskeletal: No bilateral ankle edema, no clubbing or cyanosis to extremities  Psychiatric: Appropriate affect, cooperative  Neurologic: Oriented x 3, strength symmetric in all extremities, Cranial Nerves grossly intact to confrontation, speech clear  Skin: LLE with 3x3cm blister/abscess with induration and erythema, has been I&D, tender to touch, Left great toe with callous/ulceration on the planter side.      Brief Assessment/Plan :  See detailed assessment and plan developed with APC which I have reviewed and/or edited for completeness.        Admission Status: I believe that this patient meets INPATIENT status due to cellulitis, hyperglycemia.  I feel patient’s risk for adverse outcomes and need for care warrant INPATIENT evaluation and I predict the  patient’s care encounter to likely last beyond 2 midnights.            Josette Rod,   09/05/22

## 2022-09-05 NOTE — ED PROVIDER NOTES
Subjective   Pleasant patient who presents to the ER for cellulitis and abscess to her left ankle.  Patient tells me last week she had some pain to her left ankle that she thought was from working out.  She placed some essential oils on her skin and placed a heating pad on the ankle.  She fell asleep for several hours and woke up and there was redness.  A blister developed shortly after that and has gotten worse.  Pain is much worse with walking.  She does not have a primary care doctor.  She noticed drainage from the abscess and foul smell.      Abscess  Abscess location: left ankle.  Abscess quality: draining, painful and redness  Itchin day.    Red streaking: yes    Progression:  Worsening  Pain details:     Quality:  Dull    Severity:  Moderate    Timing:  Constant    Progression:  Worsening  Exacerbated by: walking.  Associated symptoms: no fever, no nausea and no vomiting    Risk factors: no prior abscess        Review of Systems   Constitutional: Negative for chills, diaphoresis and fever.   HENT: Negative for congestion and sore throat.    Respiratory: Negative for cough, choking, chest tightness, shortness of breath and wheezing.    Cardiovascular: Negative for chest pain and leg swelling.   Gastrointestinal: Negative for abdominal distention, abdominal pain, anal bleeding, blood in stool, constipation, diarrhea, nausea and vomiting.   Genitourinary: Negative for difficulty urinating, dysuria, flank pain, frequency, hematuria and urgency.   All other systems reviewed and are negative.      No past medical history on file.    Allergies   Allergen Reactions   • Sulfa Antibiotics Rash       No past surgical history on file.    No family history on file.    Social History     Socioeconomic History   • Marital status:    Tobacco Use   • Smoking status: Never Smoker   Substance and Sexual Activity   • Alcohol use: No   • Drug use: No           Objective   Physical Exam  Constitutional:        Appearance: She is well-developed.   HENT:      Head: Normocephalic and atraumatic.      Right Ear: External ear normal.      Left Ear: External ear normal.      Nose: Nose normal.   Eyes:      Conjunctiva/sclera: Conjunctivae normal.      Pupils: Pupils are equal, round, and reactive to light.   Cardiovascular:      Rate and Rhythm: Normal rate and regular rhythm.      Heart sounds: Normal heart sounds.   Pulmonary:      Effort: Pulmonary effort is normal.      Breath sounds: Normal breath sounds.   Abdominal:      General: Bowel sounds are normal.      Palpations: Abdomen is soft.   Musculoskeletal:         General: Normal range of motion.      Cervical back: Normal range of motion and neck supple.      Comments: Large blister drainage foul smell.  Large amount of cellulitis in the lower shin up to the mid calf.   Skin:     General: Skin is warm and dry.   Neurological:      Mental Status: She is alert and oriented to person, place, and time.   Psychiatric:         Behavior: Behavior normal.         Thought Content: Thought content normal.         Judgment: Judgment normal.         Incision & Drainage    Date/Time: 9/5/2022 5:05 PM  Performed by: Andrew North APRN  Authorized by: José Miguel Chin DO     Consent:     Consent obtained:  Verbal    Consent given by:  Patient    Risks, benefits, and alternatives were discussed: yes      Risks discussed:  Bleeding, pain, incomplete drainage and infection  Universal protocol:     Patient identity confirmed:  Verbally with patient  Location:     Type:  Abscess    Size:  4cm    Location: left leg.  Pre-procedure details:     Skin preparation:  Povidone-iodine  Anesthesia:     Anesthesia method:  Topical application  Procedure type:     Complexity:  Complex  Procedure details:     Needle aspiration: yes      Needle size:  18 G    Incision types:  Stab incision    Incision depth:  Dermal    Wound management:  Probed and deloculated    Drainage:  Purulent    Drainage amount:   Moderate    Wound treatment:  Wound left open    Packing materials:  None  Post-procedure details:     Procedure completion:  Tolerated               ED Course  ED Course as of 09/05/22 1706   Mon Sep 05, 2022   1426 Significant amount of cellulitis large abscess make it drained.  Most likely would need to be admitted. [JM]      ED Course User Index  [JM] Andrew North APRN                XR Tibia Fibula 2 View Left   Final Result   There is a protuberant 4.5 cm rounded soft tissue density at the medial   distal leg likely reflecting reported blister. There is subcutaneous fat   stranding along the medial mid and distal leg near the blister.   Otherwise unremarkable appearance of the soft tissues. No soft tissue   gas. No acute osseous findings. No bony erosion or periostitis.        This report was finalized on 9/5/2022 3:16 PM by Caleb Alonso MD.                                       [facility]    Final diagnoses:   Cellulitis of left leg   Skin ulcer of left great toe, unspecified ulcer stage (HCC)   Abscess of left leg   Hyperglycemia       ED Disposition  ED Disposition     ED Disposition   Decision to Admit    Condition   --    Comment   --             No follow-up provider specified.       Medication List      No changes were made to your prescriptions during this visit.          Andrew North APRN  09/05/22 1706

## 2022-09-05 NOTE — PROGRESS NOTES
Pharmacy Consult-Vancomycin Dosing  Michelle Weaver is a  57 y.o. female receiving vancomycin therapy.     Indication: LLE cellulitis/abscess.  Also ulceration to left plantar surface of great toe  Consulting Provider: Maeve Lopez APRN  ID Consult: YES    Goal AUC: 400 - 600 mg/L*hr    Current Antimicrobial Therapy  Anti-Infectives (From admission, onward)      Ordered     Dose/Rate Route Frequency Start Stop    09/05/22 1936  piperacillin-tazobactam (ZOSYN) 3.375 g in iso-osmotic dextrose 50 ml (premix)        Note to Pharmacy: Please time from first dose given in Er today   Ordering Provider: Maeve Lopez APRN    3.375 g  over 4 Hours Intravenous Every 8 Hours 09/06/22 0130 09/13/22 0117    09/05/22 1936  piperacillin-tazobactam (ZOSYN) 3.375 g in iso-osmotic dextrose 50 ml (premix)        Ordering Provider: Maeve Lopez APRN    3.375 g  over 30 Minutes Intravenous Once 09/05/22 2030 09/05/22 1936  Pharmacy to dose vancomycin        Ordering Provider: Maeve Lopez APRN     Does not apply Continuous PRN 09/05/22 1936 09/12/22 1935 09/05/22 1418  vancomycin 1750 mg/500 mL 0.9% NS IVPB ([facility])        Ordering Provider: Andrew North APRN    20 mg/kg × 85.7 kg Intravenous Once 09/05/22 1420 09/05/22 1730    09/05/22 1418  piperacillin-tazobactam (ZOSYN) 3.375 g in iso-osmotic dextrose 50 ml (premix)        Ordering Provider: Andrew North APRN    3.375 g  over 30 Minutes Intravenous Once 09/05/22 1420 09/05/22 1600            Allergies  Allergies as of 09/05/2022 - Reviewed 09/05/2022   Allergen Reaction Noted    Sulfa antibiotics Rash 06/04/2018       Labs    Results from last 7 days   Lab Units 09/05/22  1511   BUN mg/dL 16   CREATININE mg/dL 1.00       Results from last 7 days   Lab Units 09/05/22  1511   WBC 10*3/mm3 13.33*       Evaluation of Dosing     Last Dose Received in the ED/Outside Facility: 1750 mg at 1629 (partial dose)  Is Patient on  "Dialysis or Renal Replacement: No    Ht - 172.7 cm (68\")  Wt - 104 kg (228 lb 4.2 oz)    Estimated Creatinine Clearance: 77.3 mL/min (by C-G formula based on SCr of 1 mg/dL).    Intake & Output (last 3 days)         09/03 0701  09/04 0700 09/04 0701 09/05 0700 09/05 0701 09/06 0700    IV Piggyback   150    Total Intake(mL/kg)   150 (1.4)    Net   +150                   Microbiology and Radiology  Microbiology Results (last 10 days)       Procedure Component Value - Date/Time    Wound Culture - Wound, Ankle, Left [[id number]] Collected: 09/05/22 1740    Lab Status: Preliminary result Specimen: Wound from Ankle, Left Updated: 09/05/22 1943     Gram Stain Many (4+) WBCs seen      Few (2+) Epithelial cells seen      Many (4+) Gram positive cocci in pairs and chains      Few (2+) Gram positive cocci in groups      Few (2+) Gram variable bacilli            Reported Vancomycin Levels    Results from last 7 days   Lab Units 09/05/22 1740   VANCOMYCIN RM mcg/mL 25.40                      [employer] AUC Calculation:      Predicted Steady State AUC on Current Dose: 504 mg/L*hr  _________________________________    Predicted Steady State AUC on New Dose:  mg/L*hr    Assessment/Plan:  Patient had an order for vancomycin 1750 mg, but only received a partial dose infused at 1629 and stopped at 1730. A random level was drawn and resulted as 25.4 mcg/mL.   Will order vancomycin 1250 mg IV q18h, and start approximately 12 hours from initial dose since only received a partial dose.   Pharmacy will follow  Thank you for consult    "

## 2022-09-06 ENCOUNTER — APPOINTMENT (OUTPATIENT)
Dept: MRI IMAGING | Facility: HOSPITAL | Age: 57
End: 2022-09-06

## 2022-09-06 LAB
ANION GAP SERPL CALCULATED.3IONS-SCNC: 13 MMOL/L (ref 5–15)
BASOPHILS # BLD AUTO: 0.03 10*3/MM3 (ref 0–0.2)
BASOPHILS NFR BLD AUTO: 0.3 % (ref 0–1.5)
BUN SERPL-MCNC: 18 MG/DL (ref 6–20)
BUN/CREAT SERPL: 16.4 (ref 7–25)
CALCIUM SPEC-SCNC: 8.5 MG/DL (ref 8.6–10.5)
CHLORIDE SERPL-SCNC: 93 MMOL/L (ref 98–107)
CHOLEST SERPL-MCNC: 182 MG/DL (ref 0–200)
CO2 SERPL-SCNC: 26 MMOL/L (ref 22–29)
CREAT SERPL-MCNC: 1.1 MG/DL (ref 0.57–1)
DEPRECATED RDW RBC AUTO: 37.6 FL (ref 37–54)
EGFRCR SERPLBLD CKD-EPI 2021: 58.4 ML/MIN/1.73
EOSINOPHIL # BLD AUTO: 0.19 10*3/MM3 (ref 0–0.4)
EOSINOPHIL NFR BLD AUTO: 2.1 % (ref 0.3–6.2)
ERYTHROCYTE [DISTWIDTH] IN BLOOD BY AUTOMATED COUNT: 12.4 % (ref 12.3–15.4)
GLUCOSE BLDC GLUCOMTR-MCNC: 222 MG/DL (ref 70–130)
GLUCOSE BLDC GLUCOMTR-MCNC: 236 MG/DL (ref 70–130)
GLUCOSE BLDC GLUCOMTR-MCNC: 380 MG/DL (ref 70–130)
GLUCOSE BLDC GLUCOMTR-MCNC: 381 MG/DL (ref 70–130)
GLUCOSE SERPL-MCNC: 438 MG/DL (ref 65–99)
HCT VFR BLD AUTO: 35.5 % (ref 34–46.6)
HDLC SERPL-MCNC: 25 MG/DL (ref 40–60)
HGB BLD-MCNC: 11.8 G/DL (ref 12–15.9)
IMM GRANULOCYTES # BLD AUTO: 0.07 10*3/MM3 (ref 0–0.05)
IMM GRANULOCYTES NFR BLD AUTO: 0.8 % (ref 0–0.5)
LDLC SERPL CALC-MCNC: 120 MG/DL (ref 0–100)
LDLC/HDLC SERPL: 4.62 {RATIO}
LYMPHOCYTES # BLD AUTO: 1.5 10*3/MM3 (ref 0.7–3.1)
LYMPHOCYTES NFR BLD AUTO: 16.8 % (ref 19.6–45.3)
MCH RBC QN AUTO: 28.1 PG (ref 26.6–33)
MCHC RBC AUTO-ENTMCNC: 33.2 G/DL (ref 31.5–35.7)
MCV RBC AUTO: 84.5 FL (ref 79–97)
MONOCYTES # BLD AUTO: 0.63 10*3/MM3 (ref 0.1–0.9)
MONOCYTES NFR BLD AUTO: 7 % (ref 5–12)
NEUTROPHILS NFR BLD AUTO: 6.53 10*3/MM3 (ref 1.7–7)
NEUTROPHILS NFR BLD AUTO: 73 % (ref 42.7–76)
NRBC BLD AUTO-RTO: 0 /100 WBC (ref 0–0.2)
PLATELET # BLD AUTO: 364 10*3/MM3 (ref 140–450)
PMV BLD AUTO: 11.1 FL (ref 6–12)
POTASSIUM SERPL-SCNC: 3.5 MMOL/L (ref 3.5–5.2)
RBC # BLD AUTO: 4.2 10*6/MM3 (ref 3.77–5.28)
SODIUM SERPL-SCNC: 128 MMOL/L (ref 136–145)
SODIUM SERPL-SCNC: 132 MMOL/L (ref 136–145)
TRIGL SERPL-MCNC: 208 MG/DL (ref 0–150)
VLDLC SERPL-MCNC: 37 MG/DL (ref 5–40)
WBC NRBC COR # BLD: 8.95 10*3/MM3 (ref 3.4–10.8)

## 2022-09-06 PROCEDURE — 82962 GLUCOSE BLOOD TEST: CPT

## 2022-09-06 PROCEDURE — 73720 MRI LWR EXTREMITY W/O&W/DYE: CPT

## 2022-09-06 PROCEDURE — 84295 ASSAY OF SERUM SODIUM: CPT | Performed by: INTERNAL MEDICINE

## 2022-09-06 PROCEDURE — 0 GADOBENATE DIMEGLUMINE 529 MG/ML SOLUTION: Performed by: INTERNAL MEDICINE

## 2022-09-06 PROCEDURE — 25010000002 PIPERACILLIN SOD-TAZOBACTAM PER 1 G: Performed by: NURSE PRACTITIONER

## 2022-09-06 PROCEDURE — 25010000002 VANCOMYCIN 10 G RECONSTITUTED SOLUTION

## 2022-09-06 PROCEDURE — 63710000001 INSULIN DETEMIR PER 5 UNITS: Performed by: INTERNAL MEDICINE

## 2022-09-06 PROCEDURE — 85025 COMPLETE CBC W/AUTO DIFF WBC: CPT | Performed by: NURSE PRACTITIONER

## 2022-09-06 PROCEDURE — 25010000002 ENOXAPARIN PER 10 MG: Performed by: NURSE PRACTITIONER

## 2022-09-06 PROCEDURE — 80061 LIPID PANEL: CPT | Performed by: NURSE PRACTITIONER

## 2022-09-06 PROCEDURE — 63710000001 INSULIN LISPRO (HUMAN) PER 5 UNITS: Performed by: INTERNAL MEDICINE

## 2022-09-06 PROCEDURE — 63710000001 INSULIN LISPRO (HUMAN) PER 5 UNITS: Performed by: NURSE PRACTITIONER

## 2022-09-06 PROCEDURE — 99233 SBSQ HOSP IP/OBS HIGH 50: CPT | Performed by: INTERNAL MEDICINE

## 2022-09-06 PROCEDURE — A9577 INJ MULTIHANCE: HCPCS | Performed by: INTERNAL MEDICINE

## 2022-09-06 PROCEDURE — 80048 BASIC METABOLIC PNL TOTAL CA: CPT | Performed by: NURSE PRACTITIONER

## 2022-09-06 RX ORDER — DEXTROSE MONOHYDRATE 50 MG/ML
50 INJECTION, SOLUTION INTRAVENOUS CONTINUOUS
Status: DISCONTINUED | OUTPATIENT
Start: 2022-09-06 | End: 2022-09-06

## 2022-09-06 RX ORDER — DEXTROSE MONOHYDRATE 25 G/50ML
25 INJECTION, SOLUTION INTRAVENOUS
Status: DISCONTINUED | OUTPATIENT
Start: 2022-09-06 | End: 2022-09-08 | Stop reason: HOSPADM

## 2022-09-06 RX ORDER — NICOTINE POLACRILEX 4 MG
15 LOZENGE BUCCAL
Status: DISCONTINUED | OUTPATIENT
Start: 2022-09-06 | End: 2022-09-08 | Stop reason: HOSPADM

## 2022-09-06 RX ORDER — DEXTROSE MONOHYDRATE 50 MG/ML
40 INJECTION, SOLUTION INTRAVENOUS CONTINUOUS
Status: DISCONTINUED | OUTPATIENT
Start: 2022-09-06 | End: 2022-09-06

## 2022-09-06 RX ORDER — INSULIN LISPRO 100 [IU]/ML
0-9 INJECTION, SOLUTION INTRAVENOUS; SUBCUTANEOUS
Status: DISCONTINUED | OUTPATIENT
Start: 2022-09-06 | End: 2022-09-08 | Stop reason: HOSPADM

## 2022-09-06 RX ADMIN — SENNOSIDES AND DOCUSATE SODIUM 2 TABLET: 50; 8.6 TABLET ORAL at 21:22

## 2022-09-06 RX ADMIN — INSULIN DETEMIR 10 UNITS: 100 INJECTION, SOLUTION SUBCUTANEOUS at 21:15

## 2022-09-06 RX ADMIN — GADOBENATE DIMEGLUMINE 20 ML: 529 INJECTION, SOLUTION INTRAVENOUS at 19:35

## 2022-09-06 RX ADMIN — ENOXAPARIN SODIUM 40 MG: 40 INJECTION SUBCUTANEOUS at 21:14

## 2022-09-06 RX ADMIN — Medication 10 ML: at 21:23

## 2022-09-06 RX ADMIN — DEXTROSE MONOHYDRATE 40 ML/HR: 50 INJECTION, SOLUTION INTRAVENOUS at 09:50

## 2022-09-06 RX ADMIN — INSULIN LISPRO 4 UNITS: 100 INJECTION, SOLUTION INTRAVENOUS; SUBCUTANEOUS at 11:31

## 2022-09-06 RX ADMIN — TAZOBACTAM SODIUM AND PIPERACILLIN SODIUM 3.38 G: 375; 3 INJECTION, SOLUTION INTRAVENOUS at 03:39

## 2022-09-06 RX ADMIN — TAZOBACTAM SODIUM AND PIPERACILLIN SODIUM 3.38 G: 375; 3 INJECTION, SOLUTION INTRAVENOUS at 09:47

## 2022-09-06 RX ADMIN — TAZOBACTAM SODIUM AND PIPERACILLIN SODIUM 3.38 G: 375; 3 INJECTION, SOLUTION INTRAVENOUS at 17:35

## 2022-09-06 RX ADMIN — Medication 10 ML: at 10:06

## 2022-09-06 RX ADMIN — VANCOMYCIN HYDROCHLORIDE 1250 MG: 10 INJECTION, POWDER, LYOPHILIZED, FOR SOLUTION INTRAVENOUS at 05:41

## 2022-09-06 RX ADMIN — INSULIN LISPRO 6 UNITS: 100 INJECTION, SOLUTION INTRAVENOUS; SUBCUTANEOUS at 07:46

## 2022-09-06 RX ADMIN — INSULIN LISPRO 4 UNITS: 100 INJECTION, SOLUTION INTRAVENOUS; SUBCUTANEOUS at 17:33

## 2022-09-06 RX ADMIN — SENNOSIDES AND DOCUSATE SODIUM 2 TABLET: 50; 8.6 TABLET ORAL at 09:47

## 2022-09-06 NOTE — PLAN OF CARE
Goal Outcome Evaluation:  Plan of Care Reviewed With: patient           Outcome Evaluation: Patient vital signs stable. Patient educated on her medication. Patient also educated on DM. Patient denies pain. Will continue to monitor.

## 2022-09-06 NOTE — CASE MANAGEMENT/SOCIAL WORK
Discharge Planning Assessment  [facility]     Patient Name: Michelle Weaver  MRN: [id number]  Today's Date: 9/6/2022    Admit Date: 9/5/2022     Discharge Needs Assessment     Row Name 09/06/22 0954       Living Environment    People in Home spouse    Current Living Arrangements home       Discharge Needs Assessment    Equipment Currently Used at Home none    Equipment Needed After Discharge none    Discharge Coordination/Progress Denies current use of DME, HH or outpt services.               Discharge Plan     Row Name 09/06/22 0955       Plan    Plan Home at DC    Patient/Family in Agreement with Plan yes    Plan Comments I spoke with the pt. She denies any DC needs at this time.    Final Discharge Disposition Code 01 - home or self-care    Row Name 09/06/22 0823       Plan    Final Discharge Disposition Code 01 - home or self-care              Continued Care and Services - Admitted Since 9/5/2022    Coordination has not been started for this encounter.     Selected Continued Care - Episodes Includes selections from active Coordinated Care Management episodes    Rising Risk Care Management Episode start date: 9/6/2022   There are no active outsourced providers for this episode.               Expected Discharge Date and Time     Expected Discharge Date Expected Discharge Time    Sep 8, 2022          Demographic Summary    No documentation.                Functional Status     Row Name 09/06/22 0954       Functional Status    Usual Activity Tolerance good       Functional Status, IADL    Medications independent    Meal Preparation independent    Housekeeping independent    Laundry independent    Shopping independent               Psychosocial    No documentation.                Abuse/Neglect    No documentation.                Legal    No documentation.                Substance Abuse    No documentation.                Patient Forms    No documentation.                   Tootie Graham RN

## 2022-09-06 NOTE — CONSULTS
"                    Clinical Nutrition     Nutrition Education   Reason for Visit:   Physician Consult     Patient Name: Michelle Weaver  YOB: 1965  MRN: [id number]  Date of Encounter: 09/06/22 14:53 EDT  Admission date: 9/5/2022       Assessment     Applicable diagnosis:  Patient Active Problem List   Diagnosis   • Cellulitis of left leg   • Toe ulcer (HCC)   • Anxiety   • Panic attacks   • Primary hypertension   • Hyperglycemia       Reported/Observed/Food/Nutrition Related History:     Pt and spouse present at time of education. Reviewed DM process and how the body utilizes food consumed. Reviewed s/s of hyperglycemia, portion sizes, carbohydrate foods, \"free\" foods, total goal grams of carbs per meals. All questions asked were answered. Pt kept handouts.     Labs reviewed     Results from last 7 days   Lab Units 09/06/22  0305 09/05/22  1511   GLUCOSE mg/dL 438* 334*   BUN mg/dL 18 16   CREATININE mg/dL 1.10* 1.00   SODIUM mmol/L 132* 122*   CHLORIDE mmol/L 93* 82*   POTASSIUM mmol/L 3.5 3.5   ALT (SGPT) U/L  --  9     Results from last 7 days   Lab Units 09/06/22  0305 09/05/22  1511   ALBUMIN g/dL  --  3.60   CRP mg/dL  --  15.86*   CHOLESTEROL mg/dL 182  --    TRIGLYCERIDES mg/dL 208*  --          Results from last 7 days   Lab Units 09/06/22  1112 09/06/22  0707 09/06/22  0404 09/05/22 2011   GLUCOSE mg/dL 236* 381* 380* 364*     Lab Results   Lab Value Date/Time    HGBA1C 13.50 (H) 09/05/2022 1511            Nutrition Diagnosis   Date:   Updated:   Problem Food and nutrition knowledge deficit   Etiology New DM2 dx   Signs/Symptoms A1c 13.5, verbalized lack of understanding of CHO and DM   Status:  Nutrition Intervention       • Nutrition Education provided regarding: Diet rationale, Key food habit change and Type 2 Diabetes  • RD provided printed material via [employer]-Nutrition Care Manual  and Other  • Pt acknowledged understanding of material covered    Goal: "     Increase knowledge on diet/nutrition effects on condition/status     Monitoring/Evaluation:     RD to follow up PRN    Outpatient nutrition education referral completed       Tanya Riley, MS, RD, LD  Time Spent: 30min

## 2022-09-06 NOTE — PAYOR COMM NOTE
"Brissa Best RN  Utilization Management  P:[phone]  F:[phone]  Ref# [id number]  Flaco Billings (57 y.o. Female)             Date of Birth   1965    Social Security Number       Address   [address] [place] [address]    Home Phone   [phone]    MRN   [id number]       Sabianist   None    Marital Status                               Admission Date   22    Admission Type   Emergency    Admitting Provider   Christie Walker MD    Attending Provider   Christie Walker MD    Department, Room/Bed   [facility] 5F, S520/1       Discharge Date       Discharge Disposition       Discharge Destination                               Attending Provider: Christie Walker MD    Allergies: Sulfa Antibiotics    Isolation: None   Infection: None   Code Status: CPR   Advance Care Planning Activity    Ht: 172.7 cm (68\")   Wt: 106 kg (234 lb 6.4 oz)    Admission Cmt: None   Principal Problem: Cellulitis of left leg [L03.116]                 Active Insurance as of 2022     Primary Coverage     Payor Plan Insurance Group Employer/Plan Group    [facility] [employer] EMPLOYEE [id number]     Payor Plan Address Payor Plan Phone Number Payor Plan Fax Number Effective Dates    [phone]  2022 - None Entered    [place] [address]       Subscriber Name Subscriber Birth Date Member ID       FLACO BILLINGS 1965 [id number]                 Emergency Contacts      (Rel.) Home Phone Work Phone Mobile Phone    ERON BILLINGSA (Daughter) [phone] -- --    Frederic Billings (Spouse) [phone] -- --               History & Physical      Josette Rod DO at [address] [place] [address] Medicine Services  HISTORY AND PHYSICAL    Patient Name: Flaco Billings  : 1965  MRN: [id number]  Primary Care Physician: Provider, No Known  Date of admission: 2022    Subjective   Subjective     Chief " Complaint:  LLE cellulitis    HPI:  Michelle Weaver is a 57 y.o. female with past medical history significant for anxiety, panic attacks (not requiring medication), hypertension, and overweight.  Patient presents to ER with a 4-day history of left lower extremity pain, erythema, and ulceration.  Patient reports she has been working out and running to try and lose weight.  Developed a small blister that developed into an ulceration on the bottom of her left great toe approximately April or May of this year.  Slow healing.  After working out on Wednesday of last week her legs were aching.  Reports that Thursday 9/1 she applied essential oils to a spot on her left lower shin/leg and a heating pad and fell asleep.  Awoken a few hours later with a small red spot and a small blister.  Over the last several days the blister has gotten larger and now has significant erythema, edema, and tenderness.  On ER eval noted to have elevated white count and sed rate.  X-rays pending.  Left shin I&D and blood cultures are pending.  Diagnosed with left lower extremity cellulitis and abscess. Started on vancomycin and Zosyn.  Dehydrated and started on IV fluids.  No history of diabetes mellitus however glucose 334.  Likely new diagnosis, awaiting A1c.  Will admit to hospitalist, consult ID, continue antibiotic therapy.        Review of Systems   Constitutional: Positive for activity change, appetite change and fatigue. Negative for chills and fever.   HENT: Negative.    Eyes: Negative.    Respiratory: Negative.    Cardiovascular: Positive for leg swelling.   Gastrointestinal: Negative.    Endocrine: Negative.    Genitourinary: Negative.    Musculoskeletal: Positive for gait problem.   Skin: Positive for wound.   Allergic/Immunologic: Negative.    Neurological: Positive for tremors. Negative for seizures, speech difficulty and headaches.   Hematological: Negative.    Psychiatric/Behavioral: Negative.         All other systems reviewed  and are negative.     Personal History     Past Medical History:   Diagnosis Date   • Anxiety 9/5/2022   • Panic attacks 9/5/2022   • Primary hypertension 9/5/2022             Past Surgical History:   Procedure Laterality Date   • BREAST CYST EXCISION Right     approx 2017       Family History:  family history includes Cancer in her mother; No Known Problems in her daughter, daughter, daughter, and daughter. Otherwise pertinent FHx was reviewed and unremarkable.     Social History:  reports that she has never smoked. She has never used smokeless tobacco. She reports that she does not drink alcohol and does not use drugs.  Social History     Social History Narrative   • Not on file       Medications:  amLODIPine, carvedilol, chlorthalidone, and erythromycin    Allergies   Allergen Reactions   • Sulfa Antibiotics Rash       Objective   Objective     Vital Signs:   Temp:  [98.3 °F (36.8 °C)] 98.3 °F (36.8 °C)  Heart Rate:  [83-91] 83  Resp:  [16] 16  BP: (142-178)/(82-96) 142/82    Physical Exam   Constitutional: Awake, alert.  Resting upright in chair in ER.  No acute distress.  Eyes: PERRLA, sclerae anicteric, no conjunctival injection  HENT: NCAT, mucous membranes moist  Neck: Supple, no thyromegaly, no lymphadenopathy, trachea midline  Respiratory: Clear to auscultation bilaterally, nonlabored respirations on room air.  Sats 97%.  Cardiovascular: RRR, no murmurs, rubs, or gallops, palpable pedal pulses bilaterally  Gastrointestinal: Positive bowel sounds, soft, nontender, nondistended  Musculoskeletal: Coleman spontaneously.  No clubbing or cyanosis to extremities  Psychiatric: Appropriate affect, cooperative  Neurologic: Oriented x 3, strength symmetric in all extremities, Cranial Nerves grossly intact to confrontation, speech clear  Skin: Left plantar surface of great toe with approximate 2 cm circular ulceration.  Also noted lateral shin with larger irregularly shaped approximate 2 inch ulceration with foul-smelling  odor and purulent drainage.  Associated moderate edema, erythema, and tenderness to palpation from ankle to about mid shin.      Results Reviewed:  I have personally reviewed most recent indicated data and agree with findings including:  [x]  Laboratory  [x]  Radiology  []  EKG/Telemetry  []  Pathology  []  Cardiac/Vascular Studies  [x]  Old records  []  Other:  Most pertinent findings include:      LAB RESULTS:      Lab 09/05/22  1511   WBC 13.33*   HEMOGLOBIN 13.5   HEMATOCRIT 39.9   PLATELETS 416   NEUTROS ABS 9.42*   IMMATURE GRANS (ABS) 0.17*   LYMPHS ABS 2.77   MONOS ABS 0.72   EOS ABS 0.19   MCV 82.4   SED RATE 103*   CRP 15.86*   PROCALCITONIN 0.14   LACTATE 1.7         Lab 09/05/22  1511   SODIUM 122*   POTASSIUM 3.5   CHLORIDE 82*   CO2 26.0   ANION GAP 14.0   BUN 16   CREATININE 1.00   EGFR 65.4   GLUCOSE 334*   CALCIUM 9.5   HEMOGLOBIN A1C 13.50*         Lab 09/05/22  1511   TOTAL PROTEIN 8.3   ALBUMIN 3.60   GLOBULIN 4.7   ALT (SGPT) 9   AST (SGOT) 14   BILIRUBIN 0.9   ALK PHOS 114                     Brief Urine Lab Results     None        Microbiology Results (last 10 days)     ** No results found for the last 240 hours. **          XR Tibia Fibula 2 View Left    Result Date: 9/5/2022  DATE OF EXAM: 9/5/2022 2:25 PM  PROCEDURE: XR TIBIA FIBULA 2 VW LEFT-  INDICATIONS: cellulitis with large abscess / blister to medial ankle  COMPARISON: No comparisons available.  TECHNIQUE: 4 images of the left tibia and fibula  FINDINGS: There is a protuberant 4.5 cm rounded soft tissue density at the medial distal leg likely reflecting reported blister. There is subcutaneous fat stranding along the medial mid and distal leg near the blister. Otherwise unremarkable appearance of the soft tissues. No soft tissue gas. No acute osseous findings. No bony erosion or periostitis.      Impression: There is a protuberant 4.5 cm rounded soft tissue density at the medial distal leg likely reflecting reported blister. There is  "subcutaneous fat stranding along the medial mid and distal leg near the blister. Otherwise unremarkable appearance of the soft tissues. No soft tissue gas. No acute osseous findings. No bony erosion or periostitis.  This report was finalized on 9/5/2022 3:16 PM by Caleb Alonso MD.            Assessment & Plan   Assessment & Plan       Cellulitis of left leg    Toe ulcer (HCC)    Anxiety    Panic attacks    Primary hypertension    Michelle Weaver is a 57-year-old female who presents with 4-day history of left lower extremity pain, swelling, and ulceration after use of essential oil and heating pad and fell asleep.  Developed blister which is worsened.  Found to have LLE cellulitis/abscess.  Also ulceration to left plantar surface of great toe.  Will admit to hospitalist for further management.    Assessment/plan:    Note--- patient wishes to be a \"privacy patient\".  Registration to list as privacy case.  She wishes for no family in the room while being examined or any medical discussions underway including  or children.  She states that anything they need to know she will tell them.    LLE shin wound cellulitis  LLE Abscess s/p I&D in ED  Lt great toe ulceration  --Toe ulceration developed in April or May of this year from \"rubbing from shoe\".  Nonhealing  -- LLE shin/lateral lower leg ulceration developed Thursday 9/1/2022.  Applied essential oils and heating pad to a \"sore spot on her left lower leg.  Fell asleep and awoke with blister that has since worsened.  -- X-ray left foot and left tib-fib pending.  -- Consider MRI  -- ID consult for antibiotic management  -- Wound and blood cultures pending  -- Continue IV vancomycin and Zosyn  -- Monitor labs    Diabetes Mellitus - new diagnosis A1c 13.5  -- No known history of diabetes.  Reports family history of diabetes.  -- Glucose 334 on presentation with slowed wound healing.  -- A1c 13.5, this is a new diagnosis DM type II  -- Consult dietitian  -- Consult " diabetes educator in AM  -- Start Accu-Chek AC at bedtime and LD SSI for now.  Adjust as needed.  --Will likely need insulin at discharge and she is aware of this/agreeable to learn.    Dehydration  -- Reports she has not been eating or drinking much for the last 2 weeks while grieving the death of her brother.  -- IV hydration  -- Monitor labs    Anxiety/panic attacks  Grief/Death of brother 2 wks ago   -- Reports no current medications, stable    HTN  -- Continue home BP meds and monitor    DVT prophylaxis: RLE sequential, Lovenox    CODE STATUS:    Level Of Support Discussed With: Patient  Code Status (Patient has no pulse and is not breathing): CPR (Attempt to Resuscitate)  Medical Interventions (Patient has pulse or is breathing): Full Support      This note has been completed as part of a split-shared workflow.     Signature: Electronically signed by DARLENE Torres, 09/05/22, 6:35 PM EDT      Attending   Admission Attestation       I have performed an independent face-to-face diagnostic evaluation including performing an independent physical examination as documented here.  The documented plan of care above was reviewed and developed with the advanced practice clinician (APC).      Brief Summary Statement:   Michelle Weaver is a 57 y.o. female with PMHx significant for anxiety, panic attacks, HTN, obesity who presents to the ED with complaints of LLE pain. Patient reports she has been working out trying to lose weight. She notes some soreness in her ankles, she rubbed some essential oil on and put a heated pad over it and fell asleep. When she woke up she noticed a small area of redness that progressively worsened and turned into a blister. The area began to get increasingly painful, more erythematous and indurated. In the ED found to have a large LLE abscess that was I&D by ED provider. Cultures are pending. Patient notes no prior hx of diabetes, however blood sugar >300 on arrival. She says  she has been trying to cut back on her carb intake. She also reports she had a small ulceration over the left great toe that has been present for many months. She reports normal sensation in her toes.    Remainder of detailed HPI is as noted by APC and has been reviewed and/or edited by me for completeness.    Attending Physical Exam:  Temp:  [97.9 °F (36.6 °C)-98.3 °F (36.8 °C)] 97.9 °F (36.6 °C)  Heart Rate:  [81-91] 81  Resp:  [16] 16  BP: (142-178)/(75-96) 148/75    Constitutional: Awake, alert  Eyes: PERRLA, sclerae anicteric, no conjunctival injection  HENT: NCAT, mucous membranes moist  Neck: Supple, no thyromegaly, no lymphadenopathy, trachea midline  Respiratory: Clear to auscultation bilaterally, nonlabored respirations   Cardiovascular: RRR, no murmurs, rubs, or gallops, palpable pedal pulses bilaterally  Gastrointestinal: Positive bowel sounds, soft, nontender, nondistended  Musculoskeletal: No bilateral ankle edema, no clubbing or cyanosis to extremities  Psychiatric: Appropriate affect, cooperative  Neurologic: Oriented x 3, strength symmetric in all extremities, Cranial Nerves grossly intact to confrontation, speech clear  Skin: LLE with 3x3cm blister/abscess with induration and erythema, has been I&D, tender to touch, Left great toe with callous/ulceration on the planter side.      Brief Assessment/Plan :  See detailed assessment and plan developed with APC which I have reviewed and/or edited for completeness.        Admission Status: I believe that this patient meets INPATIENT status due to cellulitis, hyperglycemia.  I feel patient’s risk for adverse outcomes and need for care warrant INPATIENT evaluation and I predict the patient’s care encounter to likely last beyond 2 midnights.            Josette Rod DO  09/05/22                      Electronically signed by Josette Rod DO at 09/05/22 2100          Emergency Department Notes      Andrew North APRN at 09/05/22 1418      Procedure  Orders    1. Incision & Drainage [[id number]] ordered by Andrew North APRN          Attestation signed by José Miguel Chin DO at 22 0615        SUPERVISE: For this patient encounter, I reviewed the APC's documentation, treatment plan, and medical decision making.  José Miguel Chin DO 2022 06:15 EDT                         Subjective   Pleasant patient who presents to the ER for cellulitis and abscess to her left ankle.  Patient tells me last week she had some pain to her left ankle that she thought was from working out.  She placed some essential oils on her skin and placed a heating pad on the ankle.  She fell asleep for several hours and woke up and there was redness.  A blister developed shortly after that and has gotten worse.  Pain is much worse with walking.  She does not have a primary care doctor.  She noticed drainage from the abscess and foul smell.      Abscess  Abscess location: left ankle.  Abscess quality: draining, painful and redness  Itchin day.    Red streaking: yes    Progression:  Worsening  Pain details:     Quality:  Dull    Severity:  Moderate    Timing:  Constant    Progression:  Worsening  Exacerbated by: walking.  Associated symptoms: no fever, no nausea and no vomiting    Risk factors: no prior abscess        Review of Systems   Constitutional: Negative for chills, diaphoresis and fever.   HENT: Negative for congestion and sore throat.    Respiratory: Negative for cough, choking, chest tightness, shortness of breath and wheezing.    Cardiovascular: Negative for chest pain and leg swelling.   Gastrointestinal: Negative for abdominal distention, abdominal pain, anal bleeding, blood in stool, constipation, diarrhea, nausea and vomiting.   Genitourinary: Negative for difficulty urinating, dysuria, flank pain, frequency, hematuria and urgency.   All other systems reviewed and are negative.      No past medical history on file.    Allergies   Allergen Reactions   • Sulfa Antibiotics Rash        No past surgical history on file.    No family history on file.    Social History     Socioeconomic History   • Marital status:    Tobacco Use   • Smoking status: Never Smoker   Substance and Sexual Activity   • Alcohol use: No   • Drug use: No           Objective   Physical Exam  Constitutional:       Appearance: She is well-developed.   HENT:      Head: Normocephalic and atraumatic.      Right Ear: External ear normal.      Left Ear: External ear normal.      Nose: Nose normal.   Eyes:      Conjunctiva/sclera: Conjunctivae normal.      Pupils: Pupils are equal, round, and reactive to light.   Cardiovascular:      Rate and Rhythm: Normal rate and regular rhythm.      Heart sounds: Normal heart sounds.   Pulmonary:      Effort: Pulmonary effort is normal.      Breath sounds: Normal breath sounds.   Abdominal:      General: Bowel sounds are normal.      Palpations: Abdomen is soft.   Musculoskeletal:         General: Normal range of motion.      Cervical back: Normal range of motion and neck supple.      Comments: Large blister drainage foul smell.  Large amount of cellulitis in the lower shin up to the mid calf.   Skin:     General: Skin is warm and dry.   Neurological:      Mental Status: She is alert and oriented to person, place, and time.   Psychiatric:         Behavior: Behavior normal.         Thought Content: Thought content normal.         Judgment: Judgment normal.         Incision & Drainage    Date/Time: 9/5/2022 5:05 PM  Performed by: Andrew North APRN  Authorized by: José Miguel Chin DO     Consent:     Consent obtained:  Verbal    Consent given by:  Patient    Risks, benefits, and alternatives were discussed: yes      Risks discussed:  Bleeding, pain, incomplete drainage and infection  Universal protocol:     Patient identity confirmed:  Verbally with patient  Location:     Type:  Abscess    Size:  4cm    Location: left leg.  Pre-procedure details:     Skin preparation:   Povidone-iodine  Anesthesia:     Anesthesia method:  Topical application  Procedure type:     Complexity:  Complex  Procedure details:     Needle aspiration: yes      Needle size:  18 G    Incision types:  Stab incision    Incision depth:  Dermal    Wound management:  Probed and deloculated    Drainage:  Purulent    Drainage amount:  Moderate    Wound treatment:  Wound left open    Packing materials:  None  Post-procedure details:     Procedure completion:  Tolerated              ED Course  ED Course as of 09/05/22 1706   Mon Sep 05, 2022   1426 Significant amount of cellulitis large abscess make it drained.  Most likely would need to be admitted. [JM]      ED Course User Index  [JM] Andrew North APRN                XR Tibia Fibula 2 View Left   Final Result   There is a protuberant 4.5 cm rounded soft tissue density at the medial   distal leg likely reflecting reported blister. There is subcutaneous fat   stranding along the medial mid and distal leg near the blister.   Otherwise unremarkable appearance of the soft tissues. No soft tissue   gas. No acute osseous findings. No bony erosion or periostitis.        This report was finalized on 9/5/2022 3:16 PM by Caleb Alonso MD.                                       [facility]    Final diagnoses:   Cellulitis of left leg   Skin ulcer of left great toe, unspecified ulcer stage (HCC)   Abscess of left leg   Hyperglycemia       ED Disposition  ED Disposition     ED Disposition   Decision to Admit    Condition   --    Comment   --             No follow-up provider specified.       Medication List      No changes were made to your prescriptions during this visit.          Andrew North APRN  09/05/22 1706      Electronically signed by José Miguel Chin DO at 09/06/22 0615         Current Facility-Administered Medications   Medication Dose Route Frequency Provider Last Rate Last Admin   • acetaminophen (TYLENOL) tablet 650 mg  650 mg Oral Q4H PRN Maeve Lopez APRN         Or   • acetaminophen (TYLENOL) 160 MG/5ML solution 650 mg  650 mg Oral Q4H PRN Maeve Lopez, DARLENE        Or   • acetaminophen (TYLENOL) suppository 650 mg  650 mg Rectal Q4H PRN Maeve Lopez, DARLENE       • sennosides-docusate (PERICOLACE) 8.6-50 MG per tablet 2 tablet  2 tablet Oral BID Maeve Lopez APRN   2 tablet at 09/06/22 0947    And   • polyethylene glycol (MIRALAX) packet 17 g  17 g Oral Daily PRN Maeve Lopez APRN        And   • bisacodyl (DULCOLAX) EC tablet 5 mg  5 mg Oral Daily PRN Maeve Lopez APRN        And   • bisacodyl (DULCOLAX) suppository 10 mg  10 mg Rectal Daily PRN Maeve Lopez APRN       • dextrose (D50W) (25 g/50 mL) IV injection 25 g  25 g Intravenous Q15 Min PRN Christie Walker MD       • dextrose (D5W) 5 % infusion  40 mL/hr Intravenous Continuous Christie Walker MD 40 mL/hr at 09/06/22 0950 40 mL/hr at 09/06/22 0950   • dextrose (GLUTOSE) oral gel 15 g  15 g Oral Q15 Min PRN Christie Walker MD       • Enoxaparin Sodium (LOVENOX) syringe 40 mg  40 mg Subcutaneous Daily Maeve Lopez APRN   40 mg at 09/05/22 2159   • glucagon (human recombinant) (GLUCAGEN DIAGNOSTIC) injection 1 mg  1 mg Intramuscular Q15 Min PRN Christie Walker MD       • HYDROcodone-acetaminophen (NORCO) 5-325 MG per tablet 1 tablet  1 tablet Oral Q4H PRN Maeve Lopez APRN       • HYDROmorphone (DILAUDID) injection 0.5 mg  0.5 mg Intravenous Q2H PRN Maeve Lopez APRN        And   • naloxone (NARCAN) injection 0.4 mg  0.4 mg Intravenous Q5 Min PRN Maeve Lopez Asuncion, DARLENE       • insulin detemir (LEVEMIR) injection 10 Units  10 Units Subcutaneous Nightly Christie Walker MD       • Insulin Lispro (humaLOG) injection 0-9 Units  0-9 Units Subcutaneous TID AC Christie Walker MD   4 Units at 09/06/22 1131   • piperacillin-tazobactam (ZOSYN) 3.375 g in iso-osmotic dextrose 50 ml  (premix)  3.375 g Intravenous Q8H Maeve Lopez, APRN 12.5 mL/hr at 09/06/22 0339 3.375 g at 09/06/22 0947   • sodium chloride 0.9 % flush 10 mL  10 mL Intravenous Q12H Maeve Lopez, APRN   10 mL at 09/06/22 1006   • sodium chloride 0.9 % flush 10 mL  10 mL Intravenous PRN Maeve Lopez, APRN           Lab Results (last 24 hours)     Procedure Component Value Units Date/Time    POC Glucose Once [[id number]]  (Abnormal) Collected: 09/06/22 1112    Specimen: Blood Updated: 09/06/22 1116     Glucose 236 mg/dL      Comment: Meter: [id number] : [id number] Kelseybette Sotois       Wound Culture - Wound, Ankle, Left [[id number]]  (Abnormal) Collected: 09/05/22 1740    Specimen: Wound from Ankle, Left Updated: 09/06/22 1007     Wound Culture Moderate growth (3+) Gram Negative Bacilli      Heavy growth (4+) Streptococcus agalactiae (Group B)     Comment: This organism is considered to be universally susceptible to penicillin.  No further antibiotic testing will be performed. If Clindamycin or Erythromycin is the drug of choice, notify the laboratory within 7 days to request susceptibility testing.        Gram Stain Many (4+) WBCs seen      Few (2+) Epithelial cells seen      Many (4+) Gram positive cocci in pairs and chains      Few (2+) Gram positive cocci in groups      Few (2+) Gram variable bacilli    POC Glucose Once [[id number]]  (Abnormal) Collected: 09/06/22 0707    Specimen: Blood Updated: 09/06/22 0709     Glucose 381 mg/dL      Comment: Meter: [id number] : [id number] Reji Arcos       POC Glucose Once [[id number]]  (Abnormal) Collected: 09/06/22 0404    Specimen: Blood Updated: 09/06/22 0406     Glucose 380 mg/dL      Comment: Meter: [id number] : [id number] Cheyanne Winslow       Basic Metabolic Panel [[id number]]  (Abnormal) Collected: 09/06/22 0305    Specimen: Blood Updated: 09/06/22 0402     Glucose 438 mg/dL      BUN 18 mg/dL      Creatinine 1.10 mg/dL      Sodium 132  mmol/L      Potassium 3.5 mmol/L      Chloride 93 mmol/L      CO2 26.0 mmol/L      Calcium 8.5 mg/dL      BUN/Creatinine Ratio 16.4     Anion Gap 13.0 mmol/L      eGFR 58.4 mL/min/1.73      Comment: National Kidney Foundation and American Society of Nephrology (ASN) Task Force recommended calculation based on the Chronic Kidney Disease Epidemiology Collaboration (CKD-EPI) equation refit without adjustment for race.       Narrative:      GFR Normal >60  Chronic Kidney Disease <60  Kidney Failure <15      Lipid Panel [[id number]]  (Abnormal) Collected: 09/06/22 0305    Specimen: Blood Updated: 09/06/22 0358     Total Cholesterol 182 mg/dL      Triglycerides 208 mg/dL      HDL Cholesterol 25 mg/dL      LDL Cholesterol  120 mg/dL      VLDL Cholesterol 37 mg/dL      LDL/HDL Ratio 4.62    Narrative:      Cholesterol Reference Ranges  (U.S. Department of Health and Human Services ATP III Classifications)    Desirable          <200 mg/dL  Borderline High    200-239 mg/dL  High Risk          >240 mg/dL      Triglyceride Reference Ranges  (U.S. Department of Health and Human Services ATP III Classifications)    Normal           <150 mg/dL  Borderline High  150-199 mg/dL  High             200-499 mg/dL  Very High        >500 mg/dL    HDL Reference Ranges  (U.S. Department of Health and Human Services ATP III Classifications)    Low     <40 mg/dl (major risk factor for CHD)  High    >60 mg/dl ('negative' risk factor for CHD)        LDL Reference Ranges  (U.S. Department of Health and Human Services ATP III Classifications)    Optimal          <100 mg/dL  Near Optimal     100-129 mg/dL  Borderline High  130-159 mg/dL  High             160-189 mg/dL  Very High        >189 mg/dL    CBC Auto Differential [[id number]]  (Abnormal) Collected: 09/06/22 0305    Specimen: Blood Updated: 09/06/22 0339     WBC 8.95 10*3/mm3      RBC 4.20 10*6/mm3      Hemoglobin 11.8 g/dL      Hematocrit 35.5 %      MCV 84.5 fL      MCH 28.1 pg      MCHC  33.2 g/dL      RDW 12.4 %      RDW-SD 37.6 fl      MPV 11.1 fL      Platelets 364 10*3/mm3      Neutrophil % 73.0 %      Lymphocyte % 16.8 %      Monocyte % 7.0 %      Eosinophil % 2.1 %      Basophil % 0.3 %      Immature Grans % 0.8 %      Neutrophils, Absolute 6.53 10*3/mm3      Lymphocytes, Absolute 1.50 10*3/mm3      Monocytes, Absolute 0.63 10*3/mm3      Eosinophils, Absolute 0.19 10*3/mm3      Basophils, Absolute 0.03 10*3/mm3      Immature Grans, Absolute 0.07 10*3/mm3      nRBC 0.0 /100 WBC     POC Glucose Once [[id number]]  (Abnormal) Collected: 09/05/22 2011    Specimen: Blood Updated: 09/05/22 2015     Glucose 364 mg/dL      Comment: Meter: [id number] : [id number] Cheyanne Winslow       Vancomycin, Random [[id number]]  (Normal) Collected: 09/05/22 1740    Specimen: Blood Updated: 09/05/22 1818     Vancomycin Random 25.40 mcg/mL     Narrative:      Therapeutic Ranges for Vancomycin    Vancomycin Random   5.0-40.0 mcg/mL  Vancomycin Trough   5.0-20.0 mcg/mL  Vancomycin Peak     20.0-40.0 mcg/mL    Hemoglobin A1c [[id number]]  (Abnormal) Collected: 09/05/22 1511    Specimen: Blood Updated: 09/05/22 1759     Hemoglobin A1C 13.50 %     Narrative:      Hemoglobin A1C Ranges:    Increased Risk for Diabetes  5.7% to 6.4%  Diabetes                     >= 6.5%  Diabetic Goal                < 7.0%    C-reactive Protein [[id number]]  (Abnormal) Collected: 09/05/22 1511    Specimen: Blood Updated: 09/05/22 1737     C-Reactive Protein 15.86 mg/dL     Sedimentation Rate [[id number]]  (Abnormal) Collected: 09/05/22 1511    Specimen: Blood Updated: 09/05/22 1724     Sed Rate 103 mm/hr     Procalcitonin [[id number]]  (Normal) Collected: 09/05/22 1511    Specimen: Blood Updated: 09/05/22 1546     Procalcitonin 0.14 ng/mL     Narrative:      As a Marker for Sepsis (Non-Neonates):    1. <0.5 ng/mL represents a low risk of severe sepsis and/or septic shock.  2. >2 ng/mL represents a high risk of severe sepsis and/or septic  "shock.    As a Marker for Lower Respiratory Tract Infections that require antibiotic therapy:    PCT on Admission    Antibiotic Therapy       6-12 Hrs later    >0.5                Strongly Recommended  >0.25 - <0.5        Recommended   0.1 - 0.25          Discouraged              Remeasure/reassess PCT  <0.1                Strongly Discouraged     Remeasure/reassess PCT    As 28 day mortality risk marker: \"Change in Procalcitonin Result\" (>80% or <=80%) if Day 0 (or Day 1) and Day 4 values are available. Refer to [email or web address]    Change in PCT <=80%  A decrease of PCT levels below or equal to 80% defines a positive change in PCT test result representing a higher risk for 28-day all-cause mortality of patients diagnosed with severe sepsis for septic shock.    Change in PCT >80%  A decrease of PCT levels of more than 80% defines a negative change in PCT result representing a lower risk for 28-day all-cause mortality of patients diagnosed with severe sepsis or septic shock.       Blood Culture - Blood, Arm, Right [[id number]] Collected: 09/05/22 1445    Specimen: Blood from Arm, Right Updated: 09/05/22 1546    Blood Culture - Blood, Arm, Right [[id number]] Collected: 09/05/22 1500    Specimen: Blood from Arm, Right Updated: 09/05/22 1545    Comprehensive Metabolic Panel [[id number]]  (Abnormal) Collected: 09/05/22 1511    Specimen: Blood Updated: 09/05/22 1543     Glucose 334 mg/dL      BUN 16 mg/dL      Creatinine 1.00 mg/dL      Sodium 122 mmol/L      Potassium 3.5 mmol/L      Chloride 82 mmol/L      CO2 26.0 mmol/L      Calcium 9.5 mg/dL      Total Protein 8.3 g/dL      Albumin 3.60 g/dL      ALT (SGPT) 9 U/L      AST (SGOT) 14 U/L      Alkaline Phosphatase 114 U/L      Total Bilirubin 0.9 mg/dL      Globulin 4.7 gm/dL      Comment: Calculated Result        A/G Ratio 0.8 g/dL      BUN/Creatinine Ratio 16.0     Anion Gap 14.0 mmol/L      eGFR 65.4 mL/min/1.73      Comment: National Kidney " Foundation and American Society of Nephrology (ASN) Task Force recommended calculation based on the Chronic Kidney Disease Epidemiology Collaboration (CKD-EPI) equation refit without adjustment for race.       Narrative:      GFR Normal >60  Chronic Kidney Disease <60  Kidney Failure <15      Lactic Acid, Plasma [[id number]]  (Normal) Collected: 09/05/22 1511    Specimen: Blood Updated: 09/05/22 1542     Lactate 1.7 mmol/L      Comment: Falsely depressed results may occur on samples drawn from patients receiving N-Acetylcysteine (NAC) or Metamizole.       CBC & Differential [[id number]]  (Abnormal) Collected: 09/05/22 1511    Specimen: Blood Updated: 09/05/22 1526    Narrative:      The following orders were created for panel order CBC & Differential.  Procedure                               Abnormality         Status                     ---------                               -----------         ------                     CBC Auto Differential[[id number]]        Abnormal            Final result                 Please view results for these tests on the individual orders.    CBC Auto Differential [[id number]]  (Abnormal) Collected: 09/05/22 1511    Specimen: Blood Updated: 09/05/22 1526     WBC 13.33 10*3/mm3      RBC 4.84 10*6/mm3      Hemoglobin 13.5 g/dL      Hematocrit 39.9 %      MCV 82.4 fL      MCH 27.9 pg      MCHC 33.8 g/dL      RDW 12.5 %      RDW-SD 37.4 fl      MPV 11.2 fL      Platelets 416 10*3/mm3      Neutrophil % 70.6 %      Lymphocyte % 20.8 %      Monocyte % 5.4 %      Eosinophil % 1.4 %      Basophil % 0.5 %      Immature Grans % 1.3 %      Neutrophils, Absolute 9.42 10*3/mm3      Lymphocytes, Absolute 2.77 10*3/mm3      Monocytes, Absolute 0.72 10*3/mm3      Eosinophils, Absolute 0.19 10*3/mm3      Basophils, Absolute 0.06 10*3/mm3      Immature Grans, Absolute 0.17 10*3/mm3      nRBC 0.0 /100 WBC         Imaging Results (Last 24 Hours)     Procedure Component Value Units Date/Time    XR Foot 3+  View Left [[id number]] Collected: 09/05/22 1933     Updated: 09/05/22 1939    Narrative:      DATE OF EXAM: 9/5/2022 5:20 PM     PROCEDURE: XR FOOT 3+ VW LEFT-     INDICATIONS: left great toe ulcer; L03.116-Cellulitis of left lower  limb; L97.529-Non-pressure chronic ulcer of other part of left foot with  unspecified severity; L02.416-Cutaneous abscess of left lower limb;  R73.9-Hyperglycemia, unspecified     COMPARISON: No comparisons available.     TECHNIQUE: 3 images of the left foot     FINDINGS:  There is soft tissue swelling/wound of the great toe without radiopaque  foreign body or definite soft tissue gas. There is dorsal foot soft  tissue swelling. No acute fracture or traumatic malalignment. The  Lisfranc joint is congruent though suboptimally assessed on  nonweightbearing radiographs. No definite bony erosion or periostitis to  suggest osteomyelitis. Vague ovoid 6 mm lucency with rim sclerosis in  the proximal shaft of the fifth toe proximal phalanx is compatible with  enchondroma.        Impression:      Prominent soft tissue swelling of the great toe as well as dorsal foot  soft tissue swelling. No radiographic evidence of osteomyelitis. If  there is persistent clinical concern for osteomyelitis, dedicated MRI  would be recommended.     This report was finalized on 9/5/2022 7:35 PM by Caleb Alonso MD.       XR Tibia Fibula 2 View Left [[id number]] Collected: 09/05/22 1513     Updated: 09/05/22 1519    Narrative:      DATE OF EXAM: 9/5/2022 2:25 PM     PROCEDURE: XR TIBIA FIBULA 2 VW LEFT-     INDICATIONS: cellulitis with large abscess / blister to medial ankle     COMPARISON: No comparisons available.     TECHNIQUE: 4 images of the left tibia and fibula     FINDINGS:  There is a protuberant 4.5 cm rounded soft tissue density at the medial  distal leg likely reflecting reported blister. There is subcutaneous fat  stranding along the medial mid and distal leg near the blister.  Otherwise unremarkable  appearance of the soft tissues. No soft tissue  gas. No acute osseous findings. No bony erosion or periostitis.        Impression:      There is a protuberant 4.5 cm rounded soft tissue density at the medial  distal leg likely reflecting reported blister. There is subcutaneous fat  stranding along the medial mid and distal leg near the blister.  Otherwise unremarkable appearance of the soft tissues. No soft tissue  gas. No acute osseous findings. No bony erosion or periostitis.      This report was finalized on 2022 3:16 PM by Caleb Alonso MD.           Operative/Procedure Notes (last 24 hours)  Notes from 22 1455 through 22 145   No notes of this type exist for this encounter.            Physician Progress Notes (last 24 hours)      Christie Walker MD at 22 0917              [facility] Medicine Services  PROGRESS NOTE    Patient Name: Michelle Weaver  : 1965  MRN: [id number]    Date of Admission: 2022  Primary Care Physician: Provider, No Known    Subjective   Subjective     CC:  Cellulitis, foot ulceration    HPI:  Patient still has swelling in her right lower extremity, has some dull aching pain.  Currently getting wound care.  Afebrile.    ROS:  General: denies fevers or chills  CV: denies chest pain  Resp: denies shortness of breath  Abd: denies abd pain, nausea      Objective   Objective     Vital Signs:   Temp:  [97.9 °F (36.6 °C)-98.8 °F (37.1 °C)] 98.8 °F (37.1 °C)  Heart Rate:  [77-95] 87  Resp:  [16-18] 18  BP: (119-178)/(65-96) 129/70     Physical Exam:  Constitutional: No acute distress, awake, alert  Respiratory: Clear to auscultation bilaterally, respiratory effort normal   Cardiovascular: RRR, very faint dorsalis pedis pulses, warm extremities  Gastrointestinal: Positive bowel sounds, soft, nontender, nondistended  Musculoskeletal: 3+ right lower extremity edema  Psychiatric: Appropriate affect, cooperative  Neurologic: No focal  neurological deficits  Skin: Ulceration on right shin debrided, swelling and mild erythema surrounding.  Callus over right great toe ulcer, no drainage      Results Reviewed:  LAB RESULTS:      Lab 09/06/22  0305 09/05/22  1511   WBC 8.95 13.33*   HEMOGLOBIN 11.8* 13.5   HEMATOCRIT 35.5 39.9   PLATELETS 364 416   NEUTROS ABS 6.53 9.42*   IMMATURE GRANS (ABS) 0.07* 0.17*   LYMPHS ABS 1.50 2.77   MONOS ABS 0.63 0.72   EOS ABS 0.19 0.19   MCV 84.5 82.4   SED RATE  --  103*   CRP  --  15.86*   PROCALCITONIN  --  0.14   LACTATE  --  1.7         Lab 09/06/22  0305 09/05/22  1511   SODIUM 132* 122*   POTASSIUM 3.5 3.5   CHLORIDE 93* 82*   CO2 26.0 26.0   ANION GAP 13.0 14.0   BUN 18 16   CREATININE 1.10* 1.00   EGFR 58.4* 65.4   GLUCOSE 438* 334*   CALCIUM 8.5* 9.5   HEMOGLOBIN A1C  --  13.50*         Lab 09/05/22  1511   TOTAL PROTEIN 8.3   ALBUMIN 3.60   GLOBULIN 4.7   ALT (SGPT) 9   AST (SGOT) 14   BILIRUBIN 0.9   ALK PHOS 114             Lab 09/06/22  0305   CHOLESTEROL 182   LDL CHOL 120*   HDL CHOL 25*   TRIGLYCERIDES 208*             Brief Urine Lab Results     None          Microbiology Results Abnormal     Procedure Component Value - Date/Time    Wound Culture - Wound, Ankle, Left [[id number]] Collected: 09/05/22 1740    Lab Status: Preliminary result Specimen: Wound from Ankle, Left Updated: 09/05/22 1943     Gram Stain Many (4+) WBCs seen      Few (2+) Epithelial cells seen      Many (4+) Gram positive cocci in pairs and chains      Few (2+) Gram positive cocci in groups      Few (2+) Gram variable bacilli          XR Tibia Fibula 2 View Left    Result Date: 9/5/2022  DATE OF EXAM: 9/5/2022 2:25 PM  PROCEDURE: XR TIBIA FIBULA 2 VW LEFT-  INDICATIONS: cellulitis with large abscess / blister to medial ankle  COMPARISON: No comparisons available.  TECHNIQUE: 4 images of the left tibia and fibula  FINDINGS: There is a protuberant 4.5 cm rounded soft tissue density at the medial distal leg likely reflecting reported  blister. There is subcutaneous fat stranding along the medial mid and distal leg near the blister. Otherwise unremarkable appearance of the soft tissues. No soft tissue gas. No acute osseous findings. No bony erosion or periostitis.      Impression: There is a protuberant 4.5 cm rounded soft tissue density at the medial distal leg likely reflecting reported blister. There is subcutaneous fat stranding along the medial mid and distal leg near the blister. Otherwise unremarkable appearance of the soft tissues. No soft tissue gas. No acute osseous findings. No bony erosion or periostitis.  This report was finalized on 9/5/2022 3:16 PM by Caleb Alonso MD.      XR Foot 3+ View Left    Result Date: 9/5/2022  DATE OF EXAM: 9/5/2022 5:20 PM  PROCEDURE: XR FOOT 3+ VW LEFT-  INDICATIONS: left great toe ulcer; L03.116-Cellulitis of left lower limb; L97.529-Non-pressure chronic ulcer of other part of left foot with unspecified severity; L02.416-Cutaneous abscess of left lower limb; R73.9-Hyperglycemia, unspecified  COMPARISON: No comparisons available.  TECHNIQUE: 3 images of the left foot  FINDINGS: There is soft tissue swelling/wound of the great toe without radiopaque foreign body or definite soft tissue gas. There is dorsal foot soft tissue swelling. No acute fracture or traumatic malalignment. The Lisfranc joint is congruent though suboptimally assessed on nonweightbearing radiographs. No definite bony erosion or periostitis to suggest osteomyelitis. Vague ovoid 6 mm lucency with rim sclerosis in the proximal shaft of the fifth toe proximal phalanx is compatible with enchondroma.      Impression: Prominent soft tissue swelling of the great toe as well as dorsal foot soft tissue swelling. No radiographic evidence of osteomyelitis. If there is persistent clinical concern for osteomyelitis, dedicated MRI would be recommended.  This report was finalized on 9/5/2022 7:35 PM by Caleb Alonso MD.            I have reviewed  "the medications:  Scheduled Meds:[START ON 9/7/2022] Pharmacy Consult, , Does not apply, Once  enoxaparin, 40 mg, Subcutaneous, Daily  insulin detemir, 5 Units, Subcutaneous, Nightly  insulin lispro, 0-7 Units, Subcutaneous, TID AC  piperacillin-tazobactam, 3.375 g, Intravenous, Q8H  senna-docusate sodium, 2 tablet, Oral, BID  sodium chloride, 10 mL, Intravenous, Q12H  vancomycin, 1,250 mg, Intravenous, Q18H      Continuous Infusions:Pharmacy to dose vancomycin,   sodium chloride, 100 mL/hr, Last Rate: 100 mL/hr (09/05/22 9540)      PRN Meds:.acetaminophen **OR** acetaminophen **OR** acetaminophen  •  senna-docusate sodium **AND** polyethylene glycol **AND** bisacodyl **AND** bisacodyl  •  dextrose  •  dextrose  •  glucagon (human recombinant)  •  HYDROcodone-acetaminophen  •  HYDROmorphone **AND** naloxone  •  Pharmacy to dose vancomycin  •  sodium chloride    Assessment & Plan   Assessment & Plan     Active Hospital Problems    Diagnosis  POA   • **Cellulitis of left leg [L03.116]  Yes   • Toe ulcer (HCC) [L97.509]  Unknown   • Anxiety [F41.9]  Unknown   • Panic attacks [F41.0]  Unknown   • Primary hypertension [I10]  Unknown   • Hyperglycemia [R73.9]  Unknown      Resolved Hospital Problems   No resolved problems to display.     Michelle Weaver is a 57-year-old female who presents with 4-day history of left lower extremity pain, swelling, and ulceration after use of essential oil and heating pad and fell asleep.  Developed blister which is worsened.  Found to have LLE cellulitis/abscess.  Also ulceration to left plantar surface of great toe.  Will admit to hospitalist for further management.     **patient wishes to be a \"privacy patient\".  Registration to list as privacy case.  She wishes for no family in the room while being examined or any medical discussions underway including  or children.  She states that anything they need to know she will tell them.**     LLE shin wound cellulitis in the setting of " uncontrolled diabetes  LLE Abscess s/p I&D in ED  Lt great toe ulceration  -Mild leukocytosis of presentation of 13,000, now resolved with antibiotics  -- X-ray left foot and left tib-fib with soft tissue swelling of the great toe and dorsal foot, no evidence of osteomyelitis.  Right tib-fib shows soft tissue density at the medial distal leg reflecting blister with subcutaneous fat stranding on the mid medial mid and distal leg near the blister.  -CRP significantly elevated at 15,000, sed rate elevated at 103.  -Blood cultures remain negative  -Wound cultures growing gram variable bacilli, gram-positive cocci  -- Continue IV zosyn per ID  -- check MRI  - Check ABIs as pulses are hard to feel    Hyponatremia  -Noted on presentation at 122, significant change overnight to 132.  Unknown if this is a chronic process or not.  May be secondary to poor p.o. intake dehydration is improved significantly with fluids  -Received fluids on admission.  -The significant increase of 10 over the past 24 hours putting her at risk for osmotic demyelination syndrome; though this is low given her initial presenting sodium was greater than 120.  This changes not explained by her glucose.  Goal rate is 4 to 6 mEq in a 24-hour period.  We will start D5W and monitor closely over the next 24 to 48 hours. May need to consider if sodium continues to trend up.     Diabetes Mellitus - new diagnosis A1c 13.5  -- A1c 13.5, this is a new diagnosis DM type II  -- Consult dietitian  --  Diabetes educator consult  -  Increase Levemir to 10 units nightly, suspect she will likely need this increased  -  Continue sliding scale insulin, will increase to moderate dose  --Will likely need insulin at discharge and she is aware of this/agreeable to learn.     Dehydration (POA)  - Reports she has not been eating or drinking much for the last 2 weeks while grieving the death of her brother.  - Deceived fluids on presentation     Anxiety/panic  attacks  Grief/Death of brother 2 wks ago   -- Reports no current medications, stable     HTN  -- Continue home BP meds and monitor     DVT prophylaxis: RLE sequential, Lovenox    DVT prophylaxis:  Medical and mechanical DVT prophylaxis orders are present.          CODE STATUS:   Code Status and Medical Interventions:   Ordered at: 09/05/22 1833     Level Of Support Discussed With:    Patient     Code Status (Patient has no pulse and is not breathing):    CPR (Attempt to Resuscitate)     Medical Interventions (Patient has pulse or is breathing):    Full Support     This patient's problems and plans were partially entered by my partner and updated as appropriate by me 09/06/22. Today is my first day evaluating this patient's active medical problems. I Personally reviewed chart and adjusted note to reflect daily changes in management/clinical condition      Christie Walker MD  09/06/22                Electronically signed by Christie Walker MD at 09/06/22 1258       Consult Notes (last 24 hours)  Notes from 09/05/22 1455 through 09/06/22 1455   No notes of this type exist for this encounter.

## 2022-09-06 NOTE — PLAN OF CARE
Goal Outcome Evaluation:     Problem: Adult Inpatient Plan of Care  Goal: Plan of Care Review  Outcome: Ongoing, Progressing  Goal: Patient-Specific Goal (Individualized)  Outcome: Ongoing, Progressing  Goal: Absence of Hospital-Acquired Illness or Injury  Outcome: Ongoing, Progressing  Intervention: Identify and Manage Fall Risk  Recent Flowsheet Documentation  Taken 9/6/2022 1828 by Carol Dailey RN  Safety Promotion/Fall Prevention: patient off unit  Taken 9/6/2022 1800 by Carol Dailey RN  Safety Promotion/Fall Prevention:   activity supervised   assistive device/personal items within reach   clutter free environment maintained   fall prevention program maintained   nonskid shoes/slippers when out of bed   room organization consistent   safety round/check completed  Taken 9/6/2022 1609 by Carol Dailey RN  Safety Promotion/Fall Prevention:   activity supervised   assistive device/personal items within reach   clutter free environment maintained   fall prevention program maintained   nonskid shoes/slippers when out of bed   room organization consistent   safety round/check completed  Taken 9/6/2022 1404 by Carol Dailey RN  Safety Promotion/Fall Prevention:   activity supervised   assistive device/personal items within reach   clutter free environment maintained   fall prevention program maintained   nonskid shoes/slippers when out of bed   room organization consistent   safety round/check completed  Taken 9/6/2022 1218 by Carol Dailey RN  Safety Promotion/Fall Prevention:   activity supervised   assistive device/personal items within reach   clutter free environment maintained   fall prevention program maintained   nonskid shoes/slippers when out of bed   room organization consistent   safety round/check completed  Taken 9/6/2022 1000 by Carol Dailey RN  Safety Promotion/Fall Prevention:   activity supervised   assistive device/personal items within reach   clutter free environment maintained    fall prevention program maintained   nonskid shoes/slippers when out of bed   room organization consistent   safety round/check completed  Taken 9/6/2022 0800 by Carol Dailey RN  Safety Promotion/Fall Prevention:   activity supervised   assistive device/personal items within reach   clutter free environment maintained   fall prevention program maintained   nonskid shoes/slippers when out of bed   room organization consistent   safety round/check completed  Intervention: Prevent Skin Injury  Recent Flowsheet Documentation  Taken 9/6/2022 1800 by Carol Dailey RN  Body Position: position changed independently  Skin Protection:   adhesive use limited   incontinence pads utilized   tubing/devices free from skin contact  Taken 9/6/2022 1609 by Carol Dailey RN  Body Position: position changed independently  Skin Protection:   adhesive use limited   incontinence pads utilized   tubing/devices free from skin contact  Taken 9/6/2022 1404 by Carol Dailey RN  Body Position: position changed independently  Skin Protection:   adhesive use limited   incontinence pads utilized   tubing/devices free from skin contact  Taken 9/6/2022 1218 by Carol Dailey RN  Body Position: position changed independently  Skin Protection:   adhesive use limited   incontinence pads utilized   tubing/devices free from skin contact  Taken 9/6/2022 1000 by Carol Dailey RN  Body Position: position changed independently  Skin Protection:   adhesive use limited   incontinence pads utilized   tubing/devices free from skin contact  Taken 9/6/2022 0800 by Carol Dailey RN  Body Position: position changed independently  Skin Protection:   adhesive use limited   incontinence pads utilized   tubing/devices free from skin contact  Intervention: Prevent and Manage VTE (Venous Thromboembolism) Risk  Recent Flowsheet Documentation  Taken 9/6/2022 1800 by Carol Dailey RN  Activity Management: activity adjusted per tolerance  Taken 9/6/2022 1609  by Asim, Carol E, RN  Activity Management: activity adjusted per tolerance  Taken 9/6/2022 1404 by Carol Dailey RN  Activity Management: activity adjusted per tolerance  Taken 9/6/2022 1218 by Carol Dailey RN  Activity Management: activity adjusted per tolerance  Taken 9/6/2022 1000 by Carol Dailey RN  Activity Management: activity adjusted per tolerance  Taken 9/6/2022 0800 by Carol Dailey RN  Activity Management: activity adjusted per tolerance  Range of Motion: active ROM (range of motion) encouraged  Intervention: Prevent Infection  Recent Flowsheet Documentation  Taken 9/6/2022 1800 by Carol Dailey RN  Infection Prevention:   environmental surveillance performed   equipment surfaces disinfected   hand hygiene promoted   rest/sleep promoted   single patient room provided  Taken 9/6/2022 1609 by Carol Dailey RN  Infection Prevention:   environmental surveillance performed   equipment surfaces disinfected   hand hygiene promoted   rest/sleep promoted   single patient room provided  Taken 9/6/2022 1404 by Carol Dailey RN  Infection Prevention:   environmental surveillance performed   equipment surfaces disinfected   hand hygiene promoted   rest/sleep promoted   single patient room provided  Taken 9/6/2022 1218 by Carol Dailey RN  Infection Prevention:   environmental surveillance performed   equipment surfaces disinfected   hand hygiene promoted   rest/sleep promoted   single patient room provided  Taken 9/6/2022 1000 by Carol Dailey RN  Infection Prevention:   environmental surveillance performed   equipment surfaces disinfected   hand hygiene promoted   rest/sleep promoted   single patient room provided  Taken 9/6/2022 0800 by Carol Dailey RN  Infection Prevention:   environmental surveillance performed   hand hygiene promoted   equipment surfaces disinfected   rest/sleep promoted   single patient room provided  Goal: Optimal Comfort and Wellbeing  Outcome: Ongoing,  Progressing  Intervention: Provide Person-Centered Care  Recent Flowsheet Documentation  Taken 9/6/2022 0800 by Carol Dailey RN  Trust Relationship/Rapport:   care explained   choices provided   empathic listening provided   questions answered   thoughts/feelings acknowledged  Goal: Readiness for Transition of Care  Outcome: Ongoing, Progressing     Problem: Diabetes Comorbidity  Goal: Blood Glucose Level Within Targeted Range  Outcome: Ongoing, Progressing     Problem: Hypertension Comorbidity  Goal: Blood Pressure in Desired Range  Outcome: Ongoing, Progressing  Intervention: Maintain Blood Pressure Management  Recent Flowsheet Documentation  Taken 9/6/2022 0800 by Carol Dailey RN  Medication Review/Management: medications reviewed      VSS on RA. WOC did wound care and dressings for pt today. Pt receiving zosyn. Pt to have MRI this evening. No voiced complaints at this time. Will continue to monitor.

## 2022-09-06 NOTE — PROGRESS NOTES
[facility] Medicine Services  PROGRESS NOTE    Patient Name: Michelle Weaver  : 1965  MRN: [id number]    Date of Admission: 2022  Primary Care Physician: Provider, No Known    Subjective   Subjective     CC:  Cellulitis, foot ulceration    HPI:  Patient still has swelling in her right lower extremity, has some dull aching pain.  Currently getting wound care.  Afebrile.    ROS:  General: denies fevers or chills  CV: denies chest pain  Resp: denies shortness of breath  Abd: denies abd pain, nausea      Objective   Objective     Vital Signs:   Temp:  [97.9 °F (36.6 °C)-98.8 °F (37.1 °C)] 98.8 °F (37.1 °C)  Heart Rate:  [77-95] 87  Resp:  [16-18] 18  BP: (119-178)/(65-96) 129/70     Physical Exam:  Constitutional: No acute distress, awake, alert  Respiratory: Clear to auscultation bilaterally, respiratory effort normal   Cardiovascular: RRR, very faint dorsalis pedis pulses, warm extremities  Gastrointestinal: Positive bowel sounds, soft, nontender, nondistended  Musculoskeletal: 3+ right lower extremity edema  Psychiatric: Appropriate affect, cooperative  Neurologic: No focal neurological deficits  Skin: Ulceration on right shin debrided, swelling and mild erythema surrounding.  Callus over right great toe ulcer, no drainage      Results Reviewed:  LAB RESULTS:      Lab 22  0305 22  1511   WBC 8.95 13.33*   HEMOGLOBIN 11.8* 13.5   HEMATOCRIT 35.5 39.9   PLATELETS 364 416   NEUTROS ABS 6.53 9.42*   IMMATURE GRANS (ABS) 0.07* 0.17*   LYMPHS ABS 1.50 2.77   MONOS ABS 0.63 0.72   EOS ABS 0.19 0.19   MCV 84.5 82.4   SED RATE  --  103*   CRP  --  15.86*   PROCALCITONIN  --  0.14   LACTATE  --  1.7         Lab 22  0305 22  1511   SODIUM 132* 122*   POTASSIUM 3.5 3.5   CHLORIDE 93* 82*   CO2 26.0 26.0   ANION GAP 13.0 14.0   BUN 18 16   CREATININE 1.10* 1.00   EGFR 58.4* 65.4   GLUCOSE 438* 334*   CALCIUM 8.5* 9.5   HEMOGLOBIN A1C  --  13.50*         Lab  09/05/22  1511   TOTAL PROTEIN 8.3   ALBUMIN 3.60   GLOBULIN 4.7   ALT (SGPT) 9   AST (SGOT) 14   BILIRUBIN 0.9   ALK PHOS 114             Lab 09/06/22  0305   CHOLESTEROL 182   LDL CHOL 120*   HDL CHOL 25*   TRIGLYCERIDES 208*             Brief Urine Lab Results     None          Microbiology Results Abnormal     Procedure Component Value - Date/Time    Wound Culture - Wound, Ankle, Left [[id number]] Collected: 09/05/22 1740    Lab Status: Preliminary result Specimen: Wound from Ankle, Left Updated: 09/05/22 1943     Gram Stain Many (4+) WBCs seen      Few (2+) Epithelial cells seen      Many (4+) Gram positive cocci in pairs and chains      Few (2+) Gram positive cocci in groups      Few (2+) Gram variable bacilli          XR Tibia Fibula 2 View Left    Result Date: 9/5/2022  DATE OF EXAM: 9/5/2022 2:25 PM  PROCEDURE: XR TIBIA FIBULA 2 VW LEFT-  INDICATIONS: cellulitis with large abscess / blister to medial ankle  COMPARISON: No comparisons available.  TECHNIQUE: 4 images of the left tibia and fibula  FINDINGS: There is a protuberant 4.5 cm rounded soft tissue density at the medial distal leg likely reflecting reported blister. There is subcutaneous fat stranding along the medial mid and distal leg near the blister. Otherwise unremarkable appearance of the soft tissues. No soft tissue gas. No acute osseous findings. No bony erosion or periostitis.      Impression: There is a protuberant 4.5 cm rounded soft tissue density at the medial distal leg likely reflecting reported blister. There is subcutaneous fat stranding along the medial mid and distal leg near the blister. Otherwise unremarkable appearance of the soft tissues. No soft tissue gas. No acute osseous findings. No bony erosion or periostitis.  This report was finalized on 9/5/2022 3:16 PM by Caleb Alonso MD.      XR Foot 3+ View Left    Result Date: 9/5/2022  DATE OF EXAM: 9/5/2022 5:20 PM  PROCEDURE: XR FOOT 3+ VW LEFT-  INDICATIONS: left great toe  ulcer; L03.116-Cellulitis of left lower limb; L97.529-Non-pressure chronic ulcer of other part of left foot with unspecified severity; L02.416-Cutaneous abscess of left lower limb; R73.9-Hyperglycemia, unspecified  COMPARISON: No comparisons available.  TECHNIQUE: 3 images of the left foot  FINDINGS: There is soft tissue swelling/wound of the great toe without radiopaque foreign body or definite soft tissue gas. There is dorsal foot soft tissue swelling. No acute fracture or traumatic malalignment. The Lisfranc joint is congruent though suboptimally assessed on nonweightbearing radiographs. No definite bony erosion or periostitis to suggest osteomyelitis. Vague ovoid 6 mm lucency with rim sclerosis in the proximal shaft of the fifth toe proximal phalanx is compatible with enchondroma.      Impression: Prominent soft tissue swelling of the great toe as well as dorsal foot soft tissue swelling. No radiographic evidence of osteomyelitis. If there is persistent clinical concern for osteomyelitis, dedicated MRI would be recommended.  This report was finalized on 9/5/2022 7:35 PM by Caleb Alonso MD.            I have reviewed the medications:  Scheduled Meds:[START ON 9/7/2022] Pharmacy Consult, , Does not apply, Once  enoxaparin, 40 mg, Subcutaneous, Daily  insulin detemir, 5 Units, Subcutaneous, Nightly  insulin lispro, 0-7 Units, Subcutaneous, TID AC  piperacillin-tazobactam, 3.375 g, Intravenous, Q8H  senna-docusate sodium, 2 tablet, Oral, BID  sodium chloride, 10 mL, Intravenous, Q12H  vancomycin, 1,250 mg, Intravenous, Q18H      Continuous Infusions:Pharmacy to dose vancomycin,   sodium chloride, 100 mL/hr, Last Rate: 100 mL/hr (09/05/22 3233)      PRN Meds:.acetaminophen **OR** acetaminophen **OR** acetaminophen  •  senna-docusate sodium **AND** polyethylene glycol **AND** bisacodyl **AND** bisacodyl  •  dextrose  •  dextrose  •  glucagon (human recombinant)  •  HYDROcodone-acetaminophen  •  HYDROmorphone  "**AND** naloxone  •  Pharmacy to dose vancomycin  •  sodium chloride    Assessment & Plan   Assessment & Plan     Active Hospital Problems    Diagnosis  POA   • **Cellulitis of left leg [L03.116]  Yes   • Toe ulcer (HCC) [L97.509]  Unknown   • Anxiety [F41.9]  Unknown   • Panic attacks [F41.0]  Unknown   • Primary hypertension [I10]  Unknown   • Hyperglycemia [R73.9]  Unknown      Resolved Hospital Problems   No resolved problems to display.     Michelle Weaver is a 57-year-old female who presents with 4-day history of left lower extremity pain, swelling, and ulceration after use of essential oil and heating pad and fell asleep.  Developed blister which is worsened.  Found to have LLE cellulitis/abscess.  Also ulceration to left plantar surface of great toe.  Will admit to hospitalist for further management.     **patient wishes to be a \"privacy patient\".  Registration to list as privacy case.  She wishes for no family in the room while being examined or any medical discussions underway including  or children.  She states that anything they need to know she will tell them.**     LLE shin wound cellulitis in the setting of uncontrolled diabetes  LLE Abscess s/p I&D in ED  Lt great toe ulceration  -Mild leukocytosis of presentation of 13,000, now resolved with antibiotics  -- X-ray left foot and left tib-fib with soft tissue swelling of the great toe and dorsal foot, no evidence of osteomyelitis.  Right tib-fib shows soft tissue density at the medial distal leg reflecting blister with subcutaneous fat stranding on the mid medial mid and distal leg near the blister.  -CRP significantly elevated at 15,000, sed rate elevated at 103.  -Blood cultures remain negative  -Wound cultures growing gram variable bacilli, gram-positive cocci  -- Continue IV zosyn per ID  -- check MRI  - Check ABIs as pulses are hard to feel    Hyponatremia  -Noted on presentation at 122, significant change overnight to 132.  Unknown if this is " a chronic process or not.  May be secondary to poor p.o. intake dehydration is improved significantly with fluids  -Received fluids on admission.  -The significant increase of 10 over the past 24 hours putting her at risk for osmotic demyelination syndrome; though this is low given her initial presenting sodium was greater than 120.  This changes not explained by her glucose.  Goal rate is 4 to 6 mEq in a 24-hour period.  We will start D5W and monitor closely over the next 24 to 48 hours. May need to consider if sodium continues to trend up.  Addendum: after d5w started sodium remains 132, will increase rate and continue to monitor q4 hours.        Diabetes Mellitus - new diagnosis A1c 13.5  -- A1c 13.5, this is a new diagnosis DM type II  -- Consult dietitian  --  Diabetes educator consult  -  Increase Levemir to 10 units nightly, suspect she will likely need this increased  -  Continue sliding scale insulin, will increase to moderate dose  --Will likely need insulin at discharge and she is aware of this/agreeable to learn.     Dehydration (POA)  - Reports she has not been eating or drinking much for the last 2 weeks while grieving the death of her brother.  - Deceived fluids on presentation     Anxiety/panic attacks  Grief/Death of brother 2 wks ago   -- Reports no current medications, stable     HTN  -- Continue home BP meds and monitor     DVT prophylaxis: RLE sequential, Lovenox    DVT prophylaxis:  Medical and mechanical DVT prophylaxis orders are present.          CODE STATUS:   Code Status and Medical Interventions:   Ordered at: 09/05/22 3528     Level Of Support Discussed With:    Patient     Code Status (Patient has no pulse and is not breathing):    CPR (Attempt to Resuscitate)     Medical Interventions (Patient has pulse or is breathing):    Full Support     This patient's problems and plans were partially entered by my partner and updated as appropriate by me 09/06/22. Today is my first day evaluating  this patient's active medical problems. I Personally reviewed chart and adjusted note to reflect daily changes in management/clinical condition      Christie Walker MD  09/06/22

## 2022-09-06 NOTE — CONSULTS
INFECTIOUS DISEASE CONSULT/INITIAL HOSPITAL VISIT    Michelle Weaver  1965  [id number]    Date of Consult: 9/6/2022    Admission Date: 9/5/2022      Requesting Provider: No ref. provider found  Evaluating Physician: Rodrigo Gerber MD    Reason for Consultation: Left leg cellulitis    History of present illness:    Patient is a 57 y.o. female with a history of obesity, hypertension,  and anxiety/panic attacks who is seen today for evaluation of cellulitis.  She presented to the emergency room yesterday with a four-day history of left leg pain, erythema, and ulceration.  She initially developed an ulceration of the left great toe 4-5 months ago.  This has failed to heal.  She developed more recent rapid onset of left medial distal leg erythema, swelling, and purulence which has progressed since last Wednesday.  She presented to the emergency room yesterday and was found to have a sedimentation rate of 103, C-reactive protein of 15.9, hemoglobin A1c of 13.5, and white blood cell count of 13.3. She was found to have a 4 cm left leg abscess and underwent incision and drainage by the emergency room provider.Gram stain of her left ankle wound revealed many white blood cells with many gram-positive cocci in pairs and chains, few gram-positive cocci in groups, and few gram variable bacilli. She was started on intravenous vancomycin and Zosyn therapy.   Her left leg wound cultures are now reported to be growing group B streptococcus and gram-negative bacilli.       Past Medical History:   Diagnosis Date   • Anxiety 9/5/2022   • Panic attacks 9/5/2022   • Primary hypertension 9/5/2022       Past Surgical History:   Procedure Laterality Date   • BREAST CYST EXCISION Right     approx 2017       Family History   Problem Relation Age of Onset   • Cancer Mother    • No Known Problems Daughter    • No Known Problems Daughter    • No Known Problems Daughter    • No Known Problems Daughter        Social History      Socioeconomic History   • Marital status:    Tobacco Use   • Smoking status: Never Smoker   • Smokeless tobacco: Never Used   Vaping Use   • Vaping Use: Never used   Substance and Sexual Activity   • Alcohol use: No   • Drug use: No   • Sexual activity: Defer     Comment: .  Lives with        Allergies   Allergen Reactions   • Sulfa Antibiotics Rash         Medication:    Current Facility-Administered Medications:   •  [START ON 9/7/2022] !HOLD vancomycin dose on 9/7 until level reviewed by Pharmacy, , Does not apply, Once, Gurmeet Castillo, [facility]  •  acetaminophen (TYLENOL) tablet 650 mg, 650 mg, Oral, Q4H PRN **OR** acetaminophen (TYLENOL) 160 MG/5ML solution 650 mg, 650 mg, Oral, Q4H PRN **OR** acetaminophen (TYLENOL) suppository 650 mg, 650 mg, Rectal, Q4H PRN, Maeve Lopez, [facility]  •  sennosides-docusate (PERICOLACE) 8.6-50 MG per tablet 2 tablet, 2 tablet, Oral, BID **AND** polyethylene glycol (MIRALAX) packet 17 g, 17 g, Oral, Daily PRN **AND** bisacodyl (DULCOLAX) EC tablet 5 mg, 5 mg, Oral, Daily PRN **AND** bisacodyl (DULCOLAX) suppository 10 mg, 10 mg, Rectal, Daily PRN, Maeve Lopez, D[facility]LENE  •  dextrose (D50W) (25 g/50 mL) IV injection 25 g, 25 g, Intravenous, Q15 Min PRN, Maeve Lopez APRN  •  dextrose (GLUTOSE) oral gel 15 g, 15 g, Oral, Q15 Min PRN, Maeve Lopez APRN  •  Enoxaparin Sodium (LOVENOX) syringe 40 mg, 40 mg, Subcutaneous, Daily, Maeve Lopez APRN, 40 mg at 09/05/22 2159  •  glucagon (human recombinant) (GLUCAGEN DIAGNOSTIC) injection 1 mg, 1 mg, Intramuscular, Q15 Min PRN, Maeve Lopez APRN  •  HYDROcodone-acetaminophen (NORCO) 5-325 MG per tablet 1 tablet, 1 tablet, Oral, Q4H PRN, Maeve Lopez, D[facility]LENE  •  HYDROmorphone (DILAUDID) injection 0.5 mg, 0.5 mg, Intravenous, Q2H PRN **AND** naloxone (NARCAN) injection 0.4 mg, 0.4 mg, Intravenous, Q5 Min PRN, Maeve Lopez,  APRN  •  insulin detemir (LEVEMIR) injection 5 Units, 5 Units, Subcutaneous, Nightly, Viola, Josette R, DO, 5 Units at 09/05/22 2157  •  Insulin Lispro (humaLOG) injection 0-7 Units, 0-7 Units, Subcutaneous, TID AC, Maeve Lopez, DARLENE  •  Pharmacy to dose vancomycin, , Does not apply, Continuous PRN, Maeve Lopez APRN  •  piperacillin-tazobactam (ZOSYN) 3.375 g in iso-osmotic dextrose 50 ml (premix), 3.375 g, Intravenous, Q8H, Maeve Lopez APRN, Last Rate: 12.5 mL/hr at 09/06/22 0339, 3.375 g at 09/06/22 0339  •  sodium chloride 0.9 % flush 10 mL, 10 mL, Intravenous, Q12H, Maeve Lopez APRN  •  sodium chloride 0.9 % flush 10 mL, 10 mL, Intravenous, PRN, Maeve Lopez APRN  •  sodium chloride 0.9 % infusion, 100 mL/hr, Intravenous, Continuous, Maeve Lopez APRN, Last Rate: 100 mL/hr at 09/05/22 2245, 100 mL/hr at 09/05/22 2245  •  vancomycin 1250 mg/250 mL 0.9% NS IVPB ([facility]), 1,250 mg, Intravenous, Q18H, Gurmeet Castillo RPH, Last Rate: 200 mL/hr at 09/06/22 0541, 1,250 mg at 09/06/22 0541    Antibiotics:  Anti-Infectives (From admission, onward)    Ordered     Dose/Rate Route Frequency Start Stop    09/05/22 1956  vancomycin 1250 mg/250 mL 0.9% NS IVPB ([facility])        Ordering Provider: Gurmeet Castillo RPH    1,250 mg  over 90 Minutes Intravenous Every 18 Hours 09/06/22 0500 09/11/22 1059    09/05/22 1936  piperacillin-tazobactam (ZOSYN) 3.375 g in iso-osmotic dextrose 50 ml (premix)        Note to Pharmacy: Please time from first dose given in Er today   Ordering Provider: Maeve Lopez APRN    3.375 g  over 4 Hours Intravenous Every 8 Hours 09/06/22 0200 09/13/22 0159    09/05/22 1936  piperacillin-tazobactam (ZOSYN) 3.375 g in iso-osmotic dextrose 50 ml (premix)        Ordering Provider: Maeve Lopez APRN    3.375 g  over 30 Minutes Intravenous Once 09/05/22 2030 09/05/22 2312    09/05/22 1936  Pharmacy to  dose vancomycin        Ordering Provider: Maeve Lopez APRN     Does not apply Continuous PRN 22 1936 22 [id number] 1418  vancomycin 1750 mg/500 mL 0.9% NS IVPB ([facility])        Ordering Provider: Andrew North APRN    20 mg/kg × 85.7 kg Intravenous Once 22 1420 22 1730    22 1418  piperacillin-tazobactam (ZOSYN) 3.375 g in iso-osmotic dextrose 50 ml (premix)        Ordering Provider: Andrew North APRN    3.375 g  over 30 Minutes Intravenous Once 22 1420 22 1600            Review of Systems:  Constitutional--she complains of malaise and fatigue but denies fever, chills, and sweats.  HEENT-- No new vision, hearing or throat complaints.   CV-- No chest pain, palpitation or syncope  Resp-- No SOB/cough  GI- No nausea, vomiting, or diarrhea.  No hematochezia, melena, or hematemesis.   -- No dysuria, hematuria, or flank pain.  Denies hesitancy, urgency or flank pain.  Heme- No active bruising or bleeding;   MS-- no swelling or pain in the bones or joints of arms/legs.  No new back pain.  Neuro-- No acute focal weakness or numbness in the arms or legs.  No seizures.  Skin--No rashes or lesions  Endocrine-she has not previously been diagnosed with diabetes mellitus but apparently has had some issues with glucose intolerance.      Physical Exam:   Vital Signs  Temp (24hrs), Av.2 °F (36.8 °C), Min:97.9 °F (36.6 °C), Max:98.3 °F (36.8 °C)    Temp  Min: 97.9 °F (36.6 °C)  Max: 98.3 °F (36.8 °C)  BP  Min: 119/65  Max: 178/96  Pulse  Min: 77  Max: 95  Resp  Min: 16  Max: 16  SpO2  Min: 92 %  Max: 99 %    GENERAL: Awake and alert, in no acute distress.   HEENT: Normocephalic, atraumatic.  PERRL. EOMI. No conjunctival injection. No icterus. Oropharynx clear without evidence of thrush or exudate.   NECK: Supple without nuchal rigidity. No mass.  LYMPH: No cervical, axillary or inguinal lymphadenopathy.  HEART: RRR; No murmur, rubs, gallops.   LUNGS: Clear to  auscultation bilaterally without wheezing, rales, rhonchi. Normal respiratory effort. Nonlabored. No dullness.  ABDOMEN: Soft, nontender, nondistended. No rebound or guarding. NO mass or HSM.  EXT: She has chronic callused/ulcer over much of her left hallux.  The depth of this cannot be assessed.  There is no purulent drainage.  She has a 4-5 cm diameter ruptured bullous lesion with purulent drainage over the medial distal left leg with substantial intense surrounding erythema.  I cannot palpate a left dorsalis pedis pulse.  :  Without Reed catheter.  MSK: No focal joint swelling or erythema  SKIN: No diffuse rash  NEURO: Oriented to PPT.  Motor 5/5 strength bilaterally  PSYCHIATRIC: Normal insight and judgement. Cooperative with PE    Laboratory Data    Results from last 7 days   Lab Units 09/06/22  0305 09/05/22  1511   WBC 10*3/mm3 8.95 13.33*   HEMOGLOBIN g/dL 11.8* 13.5   HEMATOCRIT % 35.5 39.9   PLATELETS 10*3/mm3 364 416     Results from last 7 days   Lab Units 09/06/22  0305   SODIUM mmol/L 132*   POTASSIUM mmol/L 3.5   CHLORIDE mmol/L 93*   CO2 mmol/L 26.0   BUN mg/dL 18   CREATININE mg/dL 1.10*   GLUCOSE mg/dL 438*   CALCIUM mg/dL 8.5*     Results from last 7 days   Lab Units 09/05/22  1511   ALK PHOS U/L 114   BILIRUBIN mg/dL 0.9   ALT (SGPT) U/L 9   AST (SGOT) U/L 14     Results from last 7 days   Lab Units 09/05/22  1511   SED RATE mm/hr 103*     Results from last 7 days   Lab Units 09/05/22  1511   CRP mg/dL 15.86*     Results from last 7 days   Lab Units 09/05/22  1511   LACTATE mmol/L 1.7         Results from last 7 days   Lab Units 09/05/22  1740   VANCOMYCIN RM mcg/mL 25.40     Estimated Creatinine Clearance: 71 mL/min (A) (by C-G formula based on SCr of 1.1 mg/dL (H)).      Microbiology:  No results found for: ACANTHNAEG, AFBCX, BPERTUSSISCX, BLOODCX  No results found for: BCIDPCR, CXREFLEX, CSFCX, CULTURETIS  No results found for: CULTURES, HSVCX, URCX  No results found for: EYECULTURE, GCCX,  HSVCULTURE, LABHSV  No results found for: LEGIONELLA, MRSACX, MUMPSCX, MYCOPLASCX  No results found for: NOCARDIACX, STOOLCX  No results found for: THROATCX, UNSTIMCULT, URINECX, CULTURE, VZVCULTUR  No results found for: VIRALCULTU, WOUNDCX        Radiology:  Imaging Results (Last 72 Hours)     Procedure Component Value Units Date/Time    XR Foot 3+ View Left [[id number]] Collected: 09/05/22 1933     Updated: 09/05/22 1939    Narrative:      DATE OF EXAM: 9/5/2022 5:20 PM     PROCEDURE: XR FOOT 3+ VW LEFT-     INDICATIONS: left great toe ulcer; L03.116-Cellulitis of left lower  limb; L97.529-Non-pressure chronic ulcer of other part of left foot with  unspecified severity; L02.416-Cutaneous abscess of left lower limb;  R73.9-Hyperglycemia, unspecified     COMPARISON: No comparisons available.     TECHNIQUE: 3 images of the left foot     FINDINGS:  There is soft tissue swelling/wound of the great toe without radiopaque  foreign body or definite soft tissue gas. There is dorsal foot soft  tissue swelling. No acute fracture or traumatic malalignment. The  Lisfranc joint is congruent though suboptimally assessed on  nonweightbearing radiographs. No definite bony erosion or periostitis to  suggest osteomyelitis. Vague ovoid 6 mm lucency with rim sclerosis in  the proximal shaft of the fifth toe proximal phalanx is compatible with  enchondroma.        Impression:      Prominent soft tissue swelling of the great toe as well as dorsal foot  soft tissue swelling. No radiographic evidence of osteomyelitis. If  there is persistent clinical concern for osteomyelitis, dedicated MRI  would be recommended.     This report was finalized on 9/5/2022 7:35 PM by Caleb Alonso MD.       XR Tibia Fibula 2 View Left [[id number]] Collected: 09/05/22 1513     Updated: 09/05/22 1519    Narrative:      DATE OF EXAM: 9/5/2022 2:25 PM     PROCEDURE: XR TIBIA FIBULA 2 VW LEFT-     INDICATIONS: cellulitis with large abscess / blister to medial  ankle     COMPARISON: No comparisons available.     TECHNIQUE: 4 images of the left tibia and fibula     FINDINGS:  There is a protuberant 4.5 cm rounded soft tissue density at the medial  distal leg likely reflecting reported blister. There is subcutaneous fat  stranding along the medial mid and distal leg near the blister.  Otherwise unremarkable appearance of the soft tissues. No soft tissue  gas. No acute osseous findings. No bony erosion or periostitis.        Impression:      There is a protuberant 4.5 cm rounded soft tissue density at the medial  distal leg likely reflecting reported blister. There is subcutaneous fat  stranding along the medial mid and distal leg near the blister.  Otherwise unremarkable appearance of the soft tissues. No soft tissue  gas. No acute osseous findings. No bony erosion or periostitis.      This report was finalized on 2022 3:16 PM by Caleb Alonso MD.               Impression:   1.  Left Leg abscess/left leg cellulitis-in the setting of undiagnosed diabetes mellitus.  She has undergone incision and drainage.  I suspect that the primary pathogen here is group B streptococcus but she also has a gram-negative on culture.  There is currently no evidence of MRSA infection and I will plan to discontinue her vancomycin.  2.  Left hallux ulcer-she has a chronic left hallux ulcer with a markedly elevated sedimentation rate and could have underlying osteomyelitis.  We will plan to check an MRI scan for osteomyelitis.  3.  Type 2 diabetes mellitus-with a new diagnosis and a hemoglobin A1c of 13.5. This places her at increased risk for recurrent infection.  4.  Morbid obesity  5.  History of hypertension  6.  Leukocytosis/neutrophilia-This is secondary to her severe left leg infection.    PLAN/RECOMMENDATIONS:   Thank you for asking us to see Michelle Weaver, I recommend the followin.  Left leg abscess culture-pending  2.  Blood cultures-pending  3.  Discontinue intravenous  vancomycin  4.  Continue intravenous Zosyn  5.  Consider ABIs  6.  Left hallux MRI scan    I discussed her complex situation with Dr. Walker today.       Rodrigo Gerber MD  9/6/2022  07:06 EDT

## 2022-09-06 NOTE — NURSING NOTE
"Reason for Wound, Ostomy and Continence (WOC) Nursing Consultation: \"LLE great toe and lateral shin wounds\"    Patient seen in telemetry floor.  Family/support person not present.      Wounds noted by WOC: Left great toe neuropathic ulcer, left shin traumatic blister/burn    1.Wound Assessment  Wound Type: Diabetic Ulcer  Location: Left lateral great toe  Drainage Characteristics/Odor: creamy  Drainage Amount: scant  Pain: No   Care provided: Large callus with discoloration present over wound, callus was not 100% adherent to wound bed and was easily peeled away and excised, this revealed a area with moist creamy drainage underneath callus.  Entire area was painted thoroughly with Betadine and dry gauze was loosely placed around toe  Wound Image:       2.Wound Assessment  Wound Type: Blisters  Location: Left medial shin  Measurements: 4.5 x 3.8  Wound Bed: granulating, non-granulating, moist, red and slough  Wound Edges: Open  Periwound Skin: redness and swelling   Drainage Characteristics/Odor: creamy and serosanguineous  Drainage Amount: small  Pain: Yes   Care provided: Area with loose slough which was easily debrided as well as remnants of epithelium when blister was popped.  Hydrofera Blue was moistened with normal saline and placed against wound, periwound was protected with barrier wipes, silicone foam dressing was applied.  Wound Image:           Recommendation(s) for management of wound:   -Refer to wound care orders for specific instructions on how to treat/manage wound.  -Every shift dressing change to left great toe per order  -Every other day dressing change to left medial shin  -Practice pressure injury prevention protocol.    Most recent Kenji Scale score:  Sensory Perception: 4-->no impairment  Moisture: 4-->rarely moist  Activity: 4-->walks frequently  Mobility: 4-->no limitation  Nutrition: 3-->adequate  Friction and Shear: 3-->no apparent problem  Kenji Score: 22 (09/06/22 0800)   Specialty " support surface: Foam Mattress       Pressure Injury Prevention Protocol (initiate for Kenji Score of 18 or less):   *Keep skin dry, turn q 2 hr, keep heels elevated and offloaded with offloading heel boots.    *Apply z-guard to bottom and bony prominences daily and as needed with incontinence episodes.  *Follow C.A.R.E protocol if medical devices (Bipap, cueva, Ng tube, etc) are being used.     WOC Team will continue to follow.  Please re consult if the wound(s) worsens.

## 2022-09-07 ENCOUNTER — APPOINTMENT (OUTPATIENT)
Dept: CARDIOLOGY | Facility: HOSPITAL | Age: 57
End: 2022-09-07

## 2022-09-07 LAB
ANION GAP SERPL CALCULATED.3IONS-SCNC: 7 MMOL/L (ref 5–15)
BACTERIA SPEC AEROBE CULT: ABNORMAL
BACTERIA SPEC AEROBE CULT: ABNORMAL
BASOPHILS # BLD AUTO: 0.03 10*3/MM3 (ref 0–0.2)
BASOPHILS NFR BLD AUTO: 0.5 % (ref 0–1.5)
BH CV LOWER ARTERIAL LEFT 2ND DIGIT SYS MAX: 147
BH CV LOWER ARTERIAL LEFT ABI RATIO: 1.1
BH CV LOWER ARTERIAL LEFT DORSALIS PEDIS SYS MAX: 187
BH CV LOWER ARTERIAL LEFT POST TIBIAL SYS MAX: 186
BH CV LOWER ARTERIAL LEFT TBI RATIO: 0.87
BH CV LOWER ARTERIAL RIGHT ABI RATIO: 1.1
BH CV LOWER ARTERIAL RIGHT DORSALIS PEDIS SYS MAX: 190
BH CV LOWER ARTERIAL RIGHT GREAT TOE SYS MAX: 151
BH CV LOWER ARTERIAL RIGHT POST TIBIAL SYS MAX: 164
BH CV LOWER ARTERIAL RIGHT TBI RATIO: 0.89
BUN SERPL-MCNC: 9 MG/DL (ref 6–20)
BUN/CREAT SERPL: 9.5 (ref 7–25)
CALCIUM SPEC-SCNC: 8.7 MG/DL (ref 8.6–10.5)
CHLORIDE SERPL-SCNC: 91 MMOL/L (ref 98–107)
CO2 SERPL-SCNC: 28 MMOL/L (ref 22–29)
CREAT SERPL-MCNC: 0.95 MG/DL (ref 0.57–1)
DEPRECATED RDW RBC AUTO: 38.4 FL (ref 37–54)
EGFRCR SERPLBLD CKD-EPI 2021: 69.6 ML/MIN/1.73
EOSINOPHIL # BLD AUTO: 0.12 10*3/MM3 (ref 0–0.4)
EOSINOPHIL NFR BLD AUTO: 2 % (ref 0.3–6.2)
ERYTHROCYTE [DISTWIDTH] IN BLOOD BY AUTOMATED COUNT: 12.6 % (ref 12.3–15.4)
GLUCOSE BLDC GLUCOMTR-MCNC: 167 MG/DL (ref 70–130)
GLUCOSE BLDC GLUCOMTR-MCNC: 176 MG/DL (ref 70–130)
GLUCOSE BLDC GLUCOMTR-MCNC: 201 MG/DL (ref 70–130)
GLUCOSE SERPL-MCNC: 233 MG/DL (ref 65–99)
GRAM STN SPEC: ABNORMAL
HCT VFR BLD AUTO: 36 % (ref 34–46.6)
HGB BLD-MCNC: 11.7 G/DL (ref 12–15.9)
IMM GRANULOCYTES # BLD AUTO: 0.09 10*3/MM3 (ref 0–0.05)
IMM GRANULOCYTES NFR BLD AUTO: 1.5 % (ref 0–0.5)
LYMPHOCYTES # BLD AUTO: 0.61 10*3/MM3 (ref 0.7–3.1)
LYMPHOCYTES NFR BLD AUTO: 10.2 % (ref 19.6–45.3)
MAXIMAL PREDICTED HEART RATE: 162 BPM
MCH RBC QN AUTO: 27.7 PG (ref 26.6–33)
MCHC RBC AUTO-ENTMCNC: 32.5 G/DL (ref 31.5–35.7)
MCV RBC AUTO: 85.1 FL (ref 79–97)
MONOCYTES # BLD AUTO: 0.62 10*3/MM3 (ref 0.1–0.9)
MONOCYTES NFR BLD AUTO: 10.3 % (ref 5–12)
NEUTROPHILS NFR BLD AUTO: 4.53 10*3/MM3 (ref 1.7–7)
NEUTROPHILS NFR BLD AUTO: 75.5 % (ref 42.7–76)
NRBC BLD AUTO-RTO: 0 /100 WBC (ref 0–0.2)
PLATELET # BLD AUTO: 346 10*3/MM3 (ref 140–450)
PMV BLD AUTO: 10.8 FL (ref 6–12)
POTASSIUM SERPL-SCNC: 3.5 MMOL/L (ref 3.5–5.2)
RBC # BLD AUTO: 4.23 10*6/MM3 (ref 3.77–5.28)
SODIUM SERPL-SCNC: 126 MMOL/L (ref 136–145)
SODIUM SERPL-SCNC: 131 MMOL/L (ref 136–145)
SODIUM SERPL-SCNC: 132 MMOL/L (ref 136–145)
STRESS TARGET HR: 138 BPM
UPPER ARTERIAL LEFT ARM BRACHIAL SYS MAX: 150 MMHG
UPPER ARTERIAL RIGHT ARM BRACHIAL SYS MAX: 169 MMHG
VANCOMYCIN TROUGH SERPL-MCNC: <4 MCG/ML (ref 5–20)
WBC NRBC COR # BLD: 6 10*3/MM3 (ref 3.4–10.8)

## 2022-09-07 PROCEDURE — 99232 SBSQ HOSP IP/OBS MODERATE 35: CPT | Performed by: INTERNAL MEDICINE

## 2022-09-07 PROCEDURE — 25010000002 PIPERACILLIN SOD-TAZOBACTAM PER 1 G: Performed by: NURSE PRACTITIONER

## 2022-09-07 PROCEDURE — 82962 GLUCOSE BLOOD TEST: CPT

## 2022-09-07 PROCEDURE — 93923 UPR/LXTR ART STDY 3+ LVLS: CPT

## 2022-09-07 PROCEDURE — 84295 ASSAY OF SERUM SODIUM: CPT | Performed by: INTERNAL MEDICINE

## 2022-09-07 PROCEDURE — 63710000001 INSULIN DETEMIR PER 5 UNITS: Performed by: INTERNAL MEDICINE

## 2022-09-07 PROCEDURE — 25010000002 CEFTRIAXONE PER 250 MG: Performed by: INTERNAL MEDICINE

## 2022-09-07 PROCEDURE — G0108 DIAB MANAGE TRN  PER INDIV: HCPCS | Performed by: REGISTERED NURSE

## 2022-09-07 PROCEDURE — 80048 BASIC METABOLIC PNL TOTAL CA: CPT | Performed by: INTERNAL MEDICINE

## 2022-09-07 PROCEDURE — 63710000001 INSULIN LISPRO (HUMAN) PER 5 UNITS: Performed by: INTERNAL MEDICINE

## 2022-09-07 PROCEDURE — 25010000002 ENOXAPARIN PER 10 MG: Performed by: NURSE PRACTITIONER

## 2022-09-07 PROCEDURE — 80202 ASSAY OF VANCOMYCIN: CPT

## 2022-09-07 PROCEDURE — 85025 COMPLETE CBC W/AUTO DIFF WBC: CPT | Performed by: INTERNAL MEDICINE

## 2022-09-07 RX ORDER — DEXTROSE MONOHYDRATE 50 MG/ML
25 INJECTION, SOLUTION INTRAVENOUS CONTINUOUS
Status: DISCONTINUED | OUTPATIENT
Start: 2022-09-07 | End: 2022-09-07

## 2022-09-07 RX ADMIN — INSULIN LISPRO 4 UNITS: 100 INJECTION, SOLUTION INTRAVENOUS; SUBCUTANEOUS at 07:39

## 2022-09-07 RX ADMIN — ENOXAPARIN SODIUM 40 MG: 40 INJECTION SUBCUTANEOUS at 21:40

## 2022-09-07 RX ADMIN — SODIUM CHLORIDE 2 G: 900 INJECTION INTRAVENOUS at 10:14

## 2022-09-07 RX ADMIN — TAZOBACTAM SODIUM AND PIPERACILLIN SODIUM 3.38 G: 375; 3 INJECTION, SOLUTION INTRAVENOUS at 01:29

## 2022-09-07 RX ADMIN — Medication 10 ML: at 21:41

## 2022-09-07 RX ADMIN — ACETAMINOPHEN 650 MG: 325 TABLET, FILM COATED ORAL at 12:22

## 2022-09-07 RX ADMIN — INSULIN LISPRO 2 UNITS: 100 INJECTION, SOLUTION INTRAVENOUS; SUBCUTANEOUS at 12:22

## 2022-09-07 RX ADMIN — INSULIN DETEMIR 10 UNITS: 100 INJECTION, SOLUTION SUBCUTANEOUS at 21:40

## 2022-09-07 RX ADMIN — SENNOSIDES AND DOCUSATE SODIUM 2 TABLET: 50; 8.6 TABLET ORAL at 10:13

## 2022-09-07 RX ADMIN — ACETAMINOPHEN 650 MG: 325 TABLET, FILM COATED ORAL at 04:37

## 2022-09-07 RX ADMIN — Medication 10 ML: at 10:25

## 2022-09-07 RX ADMIN — INSULIN LISPRO 2 UNITS: 100 INJECTION, SOLUTION INTRAVENOUS; SUBCUTANEOUS at 18:09

## 2022-09-07 NOTE — PLAN OF CARE
"Goal Outcome Evaluation:  Plan of Care Reviewed With: patient        Progress: improving  Outcome Evaluation: Patient vital signs are stable. Patient educated on infection control. Patient states she has \"soreness\". Patient given tylenol see MAR. Will continue to monitor.  "

## 2022-09-07 NOTE — NURSING NOTE
Patient returned back to unit around 2100 via hospital bed and  transport. Patient was brought to her hospital room by transport, but I was not informed by transport that she was back from her having her MRI  imaging done. Patient tolerated transfer well. Will continue to monitor.

## 2022-09-07 NOTE — PROGRESS NOTES
[facility] Medicine Services  PROGRESS NOTE    Patient Name: Michelle Weaver  : 1965  MRN: [id number]    Date of Admission: 2022  Primary Care Physician: Provider, No Known    Subjective   Subjective     CC:  Cellulitis, foot ulceration     HPI:  LLE not hurting.  Sensation in foot normal .  Had MRI last night.     ROS:  General: denies fevers but having chills  CV: denies chest pain  Resp: denies shortness of breath  Abd: denies abd pain, nausea      Objective   Objective     Vital Signs:   Temp:  [98.8 °F (37.1 °C)-100.1 °F (37.8 °C)] 99.5 °F (37.5 °C)  Heart Rate:  [79-99] 87  Resp:  [16-20] 16  BP: (113-146)/(67-99) 113/71     Physical Exam:  Constitutional: No acute distress, awake, alert  Respiratory: Clear to auscultation bilaterally, respiratory effort normal   Cardiovascular: RRR,  Gastrointestinal: Positive bowel sounds, soft, nontender, nondistended  Musculoskeletal: 2+ right lower extremity edema  Psychiatric: Appropriate affect, cooperative  Neurologic: No focal neurological deficits  Skin: dressing on left shin.  Wound over L toe, no odor, no drainage    Results Reviewed:  LAB RESULTS:      Lab 223 22  0305 22  1511   WBC 6.00 8.95 13.33*   HEMOGLOBIN 11.7* 11.8* 13.5   HEMATOCRIT 36.0 35.5 39.9   PLATELETS 346 364 416   NEUTROS ABS 4.53 6.53 9.42*   IMMATURE GRANS (ABS) 0.09* 0.07* 0.17*   LYMPHS ABS 0.61* 1.50 2.77   MONOS ABS 0.62 0.63 0.72   EOS ABS 0.12 0.19 0.19   MCV 85.1 84.5 82.4   SED RATE  --   --  103*   CRP  --   --  15.86*   PROCALCITONIN  --   --  0.14   LACTATE  --   --  1.7         Lab 22  0754 22  0413 22  2315 22  2045 22  1712 22  0305 22  1511   SODIUM 126* 126*  126* 132* 128* 132* 132* 122*   POTASSIUM  --  3.5  --   --   --  3.5 3.5   CHLORIDE  --  91*  --   --   --  93* 82*   CO2  --  28.0  --   --   --  26.0 26.0   ANION GAP  --  7.0  --   --   --  13.0 14.0   BUN  --  9   --   --   --  18 16   CREATININE  --  0.95  --   --   --  1.10* 1.00   EGFR  --  69.6  --   --   --  58.4* 65.4   GLUCOSE  --  233*  --   --   --  438* 334*   CALCIUM  --  8.7  --   --   --  8.5* 9.5   HEMOGLOBIN A1C  --   --   --   --   --   --  13.50*         Lab 09/05/22  1511   TOTAL PROTEIN 8.3   ALBUMIN 3.60   GLOBULIN 4.7   ALT (SGPT) 9   AST (SGOT) 14   BILIRUBIN 0.9   ALK PHOS 114             Lab 09/06/22  0305   CHOLESTEROL 182   LDL CHOL 120*   HDL CHOL 25*   TRIGLYCERIDES 208*             Brief Urine Lab Results     None          Microbiology Results Abnormal     Procedure Component Value - Date/Time    Blood Culture - Blood, Arm, Right [[id number]]  (Normal) Collected: 09/05/22 1500    Lab Status: Preliminary result Specimen: Blood from Arm, Right Updated: 09/06/22 1602     Blood Culture No growth at 24 hours    Blood Culture - Blood, Arm, Right [[id number]]  (Normal) Collected: 09/05/22 1445    Lab Status: Preliminary result Specimen: Blood from Arm, Right Updated: 09/06/22 1602     Blood Culture No growth at 24 hours          XR Tibia Fibula 2 View Left    Result Date: 9/5/2022  DATE OF EXAM: 9/5/2022 2:25 PM  PROCEDURE: XR TIBIA FIBULA 2 VW LEFT-  INDICATIONS: cellulitis with large abscess / blister to medial ankle  COMPARISON: No comparisons available.  TECHNIQUE: 4 images of the left tibia and fibula  FINDINGS: There is a protuberant 4.5 cm rounded soft tissue density at the medial distal leg likely reflecting reported blister. There is subcutaneous fat stranding along the medial mid and distal leg near the blister. Otherwise unremarkable appearance of the soft tissues. No soft tissue gas. No acute osseous findings. No bony erosion or periostitis.      Impression: There is a protuberant 4.5 cm rounded soft tissue density at the medial distal leg likely reflecting reported blister. There is subcutaneous fat stranding along the medial mid and distal leg near the blister. Otherwise unremarkable  appearance of the soft tissues. No soft tissue gas. No acute osseous findings. No bony erosion or periostitis.  This report was finalized on 9/5/2022 3:16 PM by Caleb Alonso MD.      XR Foot 3+ View Left    Result Date: 9/5/2022  DATE OF EXAM: 9/5/2022 5:20 PM  PROCEDURE: XR FOOT 3+ VW LEFT-  INDICATIONS: left great toe ulcer; L03.116-Cellulitis of left lower limb; L97.529-Non-pressure chronic ulcer of other part of left foot with unspecified severity; L02.416-Cutaneous abscess of left lower limb; R73.9-Hyperglycemia, unspecified  COMPARISON: No comparisons available.  TECHNIQUE: 3 images of the left foot  FINDINGS: There is soft tissue swelling/wound of the great toe without radiopaque foreign body or definite soft tissue gas. There is dorsal foot soft tissue swelling. No acute fracture or traumatic malalignment. The Lisfranc joint is congruent though suboptimally assessed on nonweightbearing radiographs. No definite bony erosion or periostitis to suggest osteomyelitis. Vague ovoid 6 mm lucency with rim sclerosis in the proximal shaft of the fifth toe proximal phalanx is compatible with enchondroma.      Impression: Prominent soft tissue swelling of the great toe as well as dorsal foot soft tissue swelling. No radiographic evidence of osteomyelitis. If there is persistent clinical concern for osteomyelitis, dedicated MRI would be recommended.  This report was finalized on 9/5/2022 7:35 PM by Caleb Alonso MD.            I have reviewed the medications:  Scheduled Meds:cefTRIAXone, 2 g, Intravenous, Q24H  enoxaparin, 40 mg, Subcutaneous, Daily  insulin detemir, 10 Units, Subcutaneous, Nightly  insulin lispro, 0-9 Units, Subcutaneous, TID AC  senna-docusate sodium, 2 tablet, Oral, BID  sodium chloride, 10 mL, Intravenous, Q12H      Continuous Infusions:dextrose, 50 mL/hr, Last Rate: Stopped (09/07/22 0612)      PRN Meds:.•  acetaminophen **OR** acetaminophen **OR** acetaminophen  •  senna-docusate sodium **AND**  "polyethylene glycol **AND** bisacodyl **AND** bisacodyl  •  dextrose  •  dextrose  •  glucagon (human recombinant)  •  HYDROcodone-acetaminophen  •  HYDROmorphone **AND** naloxone  •  sodium chloride    Assessment & Plan   Assessment & Plan     Active Hospital Problems    Diagnosis  POA   • **Cellulitis of left leg [L03.116]  Yes   • Toe ulcer (HCC) [L97.509]  Unknown   • Anxiety [F41.9]  Unknown   • Panic attacks [F41.0]  Unknown   • Primary hypertension [I10]  Unknown   • Hyperglycemia [R73.9]  Unknown      Resolved Hospital Problems   No resolved problems to display.     Michelle Weaver is a 57-year-old female who presents with 4-day history of left lower extremity pain, swelling, and ulceration after use of essential oil and heating pad and fell asleep.  Developed blister which is worsened.  Found to have LLE cellulitis/abscess.  Also ulceration to left plantar surface of great toe.  Will admit to hospitalist for further management.     **patient wishes to be a \"privacy patient\".  Registration to list as privacy case.  She wishes for no family in the room while being examined or any medical discussions underway including  or children.  She states that anything they need to know she will tell them.**     LLE shin wound cellulitis in the setting of uncontrolled diabetes  LLE Abscess s/p I&D in ED  Lt great toe ulceration  -- X-ray left foot and left tib-fib:  No osteo, but pedal/toe swelling  - Right tib-fib shows medial distal soft tissue stranding  - CRP significantly elevated at 15,000, sed rate elevated at 103.  -Blood cultures remain negative  -Wound cultures growing gram variable bacilli, gram-positive cocci  -- Continue IV zosyn per ID  -- await MRI read     Hyponatremia  - 122 on admission, went to 132, so D5W started; now stable at 126; will stop D5W  - monitor q6h      Diabetes Mellitus - new diagnosis A1c 13.5  -- A1c 13.5, this is a new diagnosis DM type II  -- Consult dietitian  --  Diabetes " educator consult  -  Increase Levemir to 10 units nightly, suspect she will likely need this increased  -- Will likely need insulin at discharge and she is aware of this/agreeable to learn.     Dehydration (POA)  - better   Anxiety/panic attacks  Grief/Death of brother 2 wks ago   -- Reports no current medications, stable     HTN  -- Continue home BP meds and monitor     DVT prophylaxis: RLE sequential, Lovenox  Medical and mechanical DVT prophylaxis orders are present.     AM-PAC 6 Clicks Score (PT): 24 (09/06/22 2100)    CODE STATUS:   Code Status and Medical Interventions:   Ordered at: 09/05/22 6133     Level Of Support Discussed With:    Patient     Code Status (Patient has no pulse and is not breathing):    CPR (Attempt to Resuscitate)     Medical Interventions (Patient has pulse or is breathing):    Full Support     This patient's problems and plans were partially entered by my partner and updated as appropriate by me 09/07/22. Today is my first day evaluating this patient's active medical problems. I Personally reviewed chart and adjusted note to reflect daily changes in management/clinical condition      Lisa Crenshaw MD  09/07/22

## 2022-09-07 NOTE — CASE MANAGEMENT/SOCIAL WORK
Continued Stay Note  [facility]     Patient Name: Michelle Weaver  MRN: [id number]  Today's Date: 9/7/2022    Admit Date: 9/5/2022     Discharge Plan     Row Name 09/07/22 1221       Plan    Plan Home - will likely need ABX therapywound care    Patient/Family in Agreement with Plan yes    Plan Comments Per note, Mrs Weaver was seen by the DM educator today for a new dx of diabetes and received a new glucometer/teaching. Will likely go home on insulin. WOC following for wound care. MRI pending of L foot. Nutrition following for DM diet education. Per ID, blood cultures pending. Continuing Zosyn. Plan is TBD. Will need final abx plan. Will need wound care upon DC.    Final Discharge Disposition Code 01 - home or self-care               Discharge Codes    No documentation.               Expected Discharge Date and Time     Expected Discharge Date Expected Discharge Time    Sep 13, 2022             Christie Schmidt RN

## 2022-09-07 NOTE — PROGRESS NOTES
INFECTIOUS DISEASE Progress Note    Michelle Weaver  1965  [id number]    Admission Date: 9/5/2022      Requesting Provider: José Miguel Chin DO  Evaluating Physician: Rodrigo Gerber MD    Reason for Consultation: Left leg cellulitis    History of present illness:    9/6/22: Patient is a 57 y.o. female with a history of obesity, hypertension,  and anxiety/panic attacks who is seen today for evaluation of cellulitis.  She presented to the emergency room yesterday with a four-day history of left leg pain, erythema, and ulceration.  She initially developed an ulceration of the left great toe 4-5 months ago.  This has failed to heal.  She developed more recent rapid onset of left medial distal leg erythema, swelling, and purulence which has progressed since last Wednesday.  She presented to the emergency room yesterday and was found to have a sedimentation rate of 103, C-reactive protein of 15.9, hemoglobin A1c of 13.5, and white blood cell count of 13.3. She was found to have a 4 cm left leg abscess and underwent incision and drainage by the emergency room provider.Gram stain of her left ankle wound revealed many white blood cells with many gram-positive cocci in pairs and chains, few gram-positive cocci in groups, and few gram variable bacilli. She was started on intravenous vancomycin and Zosyn therapy.   Her left leg wound cultures are now reported to be growing group B streptococcus and gram-negative bacilli.    9/7/22: Maximum temperature over the last 24 hours is 100.1°. Her left ankle wound culture is growing Citrobacter and group B streptococcus.  The Citrobacter is sensitive to ceftriaxone. Blood cultures from 9/5 have remained negative.  She has decreased left leg pain.  She denies nausea, vomiting, and diarrhea.  She denies cough and sputum production.  Her bilateral lower extremity ABIs are normal.       Past Medical History:   Diagnosis Date   • Anxiety 9/5/2022   • Panic attacks 9/5/2022   • Primary  hypertension 9/5/2022       Past Surgical History:   Procedure Laterality Date   • BREAST CYST EXCISION Right     approx 2017       Family History   Problem Relation Age of Onset   • Cancer Mother    • No Known Problems Daughter    • No Known Problems Daughter    • No Known Problems Daughter    • No Known Problems Daughter        Social History     Socioeconomic History   • Marital status:    Tobacco Use   • Smoking status: Never Smoker   • Smokeless tobacco: Never Used   Vaping Use   • Vaping Use: Never used   Substance and Sexual Activity   • Alcohol use: No   • Drug use: No   • Sexual activity: Defer     Comment: .  Lives with        Allergies   Allergen Reactions   • Sulfa Antibiotics Rash         Medication:    Current Facility-Administered Medications:   •  acetaminophen (TYLENOL) tablet 650 mg, 650 mg, Oral, Q4H PRN, 650 mg at 09/07/22 0437 **OR** acetaminophen (TYLENOL) 160 MG/5ML solution 650 mg, 650 mg, Oral, Q4H PRN **OR** acetaminophen (TYLENOL) suppository 650 mg, 650 mg, Rectal, Q4H PRN, Maeve Lopez APRN  •  sennosides-docusate (PERICOLACE) 8.6-50 MG per tablet 2 tablet, 2 tablet, Oral, BID, 2 tablet at 09/06/22 2122 **AND** polyethylene glycol (MIRALAX) packet 17 g, 17 g, Oral, Daily PRN **AND** bisacodyl (DULCOLAX) EC tablet 5 mg, 5 mg, Oral, Daily PRN **AND** bisacodyl (DULCOLAX) suppository 10 mg, 10 mg, Rectal, Daily PRN, Maeve Lopez APRN  •  dextrose (D50W) (25 g/50 mL) IV injection 25 g, 25 g, Intravenous, Q15 Min PRN, Christie Walker MD  •  dextrose (D5W) 5 % infusion, 50 mL/hr, Intravenous, Continuous, Kayleigh North APRN, Stopped at 09/07/22 0612  •  dextrose (GLUTOSE) oral gel 15 g, 15 g, Oral, Q15 Min PRN, Christie Walker MD  •  Enoxaparin Sodium (LOVENOX) syringe 40 mg, 40 mg, Subcutaneous, Daily, Maeve Lopez APRN, 40 mg at 09/06/22 2114  •  glucagon (human recombinant) (GLUCAGEN DIAGNOSTIC) injection 1 mg, 1 mg,  Intramuscular, Q15 Min PRN, Christie Walker MD  •  HYDROcodone-acetaminophen (NORCO) 5-325 MG per tablet 1 tablet, 1 tablet, Oral, Q4H PRN, Maeve Lopez APRN  •  HYDROmorphone (DILAUDID) injection 0.5 mg, 0.5 mg, Intravenous, Q2H PRN **AND** naloxone (NARCAN) injection 0.4 mg, 0.4 mg, Intravenous, Q5 Min PRN, Maeve Lopez APRN  •  insulin detemir (LEVEMIR) injection 10 Units, 10 Units, Subcutaneous, Nightly, Christie Walker MD, 10 Units at 09/06/22 2115  •  Insulin Lispro (humaLOG) injection 0-9 Units, 0-9 Units, Subcutaneous, TID AC, Christie Walker MD, 4 Units at 09/07/22 0739  •  piperacillin-tazobactam (ZOSYN) 3.375 g in iso-osmotic dextrose 50 ml (premix), 3.375 g, Intravenous, Q8H, Maeve Lopez APRN, Last Rate: 12.5 mL/hr at 09/07/22 0129, 3.375 g at 09/07/22 0129  •  sodium chloride 0.9 % flush 10 mL, 10 mL, Intravenous, Q12H, Maeve Lopez APRN, 10 mL at 09/06/22 2123  •  sodium chloride 0.9 % flush 10 mL, 10 mL, Intravenous, PRN, Maeve Lopez APRN    Antibiotics:  Anti-Infectives (From admission, onward)    Ordered     Dose/Rate Route Frequency Start Stop    09/05/22 1936  piperacillin-tazobactam (ZOSYN) 3.375 g in iso-osmotic dextrose 50 ml (premix)        Note to Pharmacy: Please time from first dose given in Er today   Ordering Provider: Maeve Lopez APRN    3.375 g  over 4 Hours Intravenous Every 8 Hours 09/06/22 0200 09/13/22 0159    09/05/22 1936  piperacillin-tazobactam (ZOSYN) 3.375 g in iso-osmotic dextrose 50 ml (premix)        Ordering Provider: Maeve Lopez APRN    3.375 g  over 30 Minutes Intravenous Once 09/05/22 2030 09/05/22 2312    09/05/22 1418  vancomycin 1750 mg/500 mL 0.9% NS IVPB ([facility])        Ordering Provider: Andrew North APRN    20 mg/kg × 85.7 kg Intravenous Once 09/05/22 1420 09/05/22 1730    09/05/22 1418  piperacillin-tazobactam (ZOSYN) 3.375 g in iso-osmotic dextrose 50 ml  (premix)        Ordering Provider: Andrew North APRN    3.375 g  over 30 Minutes Intravenous Once 22 1420 22 1600            Review of Systems:  See HPI    Physical Exam:   Vital Signs  Temp (24hrs), Av.3 °F (37.4 °C), Min:98.8 °F (37.1 °C), Max:100.1 °F (37.8 °C)    Temp  Min: 98.8 °F (37.1 °C)  Max: 100.1 °F (37.8 °C)  BP  Min: 113/71  Max: 146/78  Pulse  Min: 79  Max: 99  Resp  Min: 16  Max: 20  SpO2  Min: 92 %  Max: 100 %    GENERAL: Awake and alert, in no acute distress.   HEENT: Normocephalic, atraumatic.  PERRL. EOMI. No conjunctival injection. No icterus. Oropharynx clear without evidence of thrush or exudate.   NECK: Supple without nuchal rigidity. No mass.  LYMPH: No cervical, axillary or inguinal lymphadenopathy.  HEART: RRR; No murmur, rubs, gallops.   LUNGS: Clear to auscultation bilaterally without wheezing, rales, rhonchi. Normal respiratory effort. Nonlabored. No dullness.  ABDOMEN: Soft, nontender, nondistended. No rebound or guarding. NO mass or HSM.  EXT: She has chronic callused/ulcer over much of her left hallux.  The depth of this cannot be assessed.  There is no purulent drainage.  She has a 4-5 cm diameter ruptured bullous lesion with decreased purulent drainage over the medial distal left leg with decreased surrounding erythema.  :  Without Reed catheter.  MSK: No focal joint swelling or erythema  SKIN: No diffuse rash  NEURO: Oriented to PPT.  Motor 5/5 strength bilaterally  PSYCHIATRIC: Normal insight and judgement. Cooperative with PE    Laboratory Data    Results from last 7 days   Lab Units 22  0413 22  0305 22  1511   WBC 10*3/mm3 6.00 8.95 13.33*   HEMOGLOBIN g/dL 11.7* 11.8* 13.5   HEMATOCRIT % 36.0 35.5 39.9   PLATELETS 10*3/mm3 346 364 416     Results from last 7 days   Lab Units 22  0413   SODIUM mmol/L 126*  126*   POTASSIUM mmol/L 3.5   CHLORIDE mmol/L 91*   CO2 mmol/L 28.0   BUN mg/dL 9   CREATININE mg/dL 0.95   GLUCOSE mg/dL 233*    CALCIUM mg/dL 8.7     Results from last 7 days   Lab Units 09/05/22  1511   ALK PHOS U/L 114   BILIRUBIN mg/dL 0.9   ALT (SGPT) U/L 9   AST (SGOT) U/L 14     Results from last 7 days   Lab Units 09/05/22  1511   SED RATE mm/hr 103*     Results from last 7 days   Lab Units 09/05/22  1511   CRP mg/dL 15.86*     Results from last 7 days   Lab Units 09/05/22  1511   LACTATE mmol/L 1.7         Results from last 7 days   Lab Units 09/05/22  1740   VANCOMYCIN RM mcg/mL 25.40     Estimated Creatinine Clearance: 82.2 mL/min (by C-G formula based on SCr of 0.95 mg/dL).      Microbiology:  No results found for: ACANTHNAEG, AFBCX, BPERTUSSISCX, BLOODCX  No results found for: BCIDPCR, CXREFLEX, CSFCX, CULTURETIS  No results found for: CULTURES, HSVCX, URCX  No results found for: EYECULTURE, GCCX, HSVCULTURE, LABHSV  No results found for: LEGIONELLA, MRSACX, MUMPSCX, MYCOPLASCX  No results found for: NOCARDIACX, STOOLCX  No results found for: THROATCX, UNSTIMCULT, URINECX, CULTURE, VZVCULTUR  No results found for: VIRALCULTU, WOUNDCX        Radiology:  Imaging Results (Last 72 Hours)     Procedure Component Value Units Date/Time    MRI Foot Left With & Without Contrast [[id number]] Resulted: 09/06/22 1913     Updated: 09/06/22 1935    XR Foot 3+ View Left [[id number]] Collected: 09/05/22 1933     Updated: 09/05/22 1939    Narrative:      DATE OF EXAM: 9/5/2022 5:20 PM     PROCEDURE: XR FOOT 3+ VW LEFT-     INDICATIONS: left great toe ulcer; L03.116-Cellulitis of left lower  limb; L97.529-Non-pressure chronic ulcer of other part of left foot with  unspecified severity; L02.416-Cutaneous abscess of left lower limb;  R73.9-Hyperglycemia, unspecified     COMPARISON: No comparisons available.     TECHNIQUE: 3 images of the left foot     FINDINGS:  There is soft tissue swelling/wound of the great toe without radiopaque  foreign body or definite soft tissue gas. There is dorsal foot soft  tissue swelling. No acute fracture or traumatic  malalignment. The  Lisfranc joint is congruent though suboptimally assessed on  nonweightbearing radiographs. No definite bony erosion or periostitis to  suggest osteomyelitis. Vague ovoid 6 mm lucency with rim sclerosis in  the proximal shaft of the fifth toe proximal phalanx is compatible with  enchondroma.        Impression:      Prominent soft tissue swelling of the great toe as well as dorsal foot  soft tissue swelling. No radiographic evidence of osteomyelitis. If  there is persistent clinical concern for osteomyelitis, dedicated MRI  would be recommended.     This report was finalized on 9/5/2022 7:35 PM by Caleb Alonso MD.       XR Tibia Fibula 2 View Left [[id number]] Collected: 09/05/22 1513     Updated: 09/05/22 1519    Narrative:      DATE OF EXAM: 9/5/2022 2:25 PM     PROCEDURE: XR TIBIA FIBULA 2 VW LEFT-     INDICATIONS: cellulitis with large abscess / blister to medial ankle     COMPARISON: No comparisons available.     TECHNIQUE: 4 images of the left tibia and fibula     FINDINGS:  There is a protuberant 4.5 cm rounded soft tissue density at the medial  distal leg likely reflecting reported blister. There is subcutaneous fat  stranding along the medial mid and distal leg near the blister.  Otherwise unremarkable appearance of the soft tissues. No soft tissue  gas. No acute osseous findings. No bony erosion or periostitis.        Impression:      There is a protuberant 4.5 cm rounded soft tissue density at the medial  distal leg likely reflecting reported blister. There is subcutaneous fat  stranding along the medial mid and distal leg near the blister.  Otherwise unremarkable appearance of the soft tissues. No soft tissue  gas. No acute osseous findings. No bony erosion or periostitis.      This report was finalized on 9/5/2022 3:16 PM by Caleb Alonso MD.         I read her MRI scan with Dr. Weaver in radiology and he thinks that there is no evidence of osteomyelitis.      Impression:   1.  Left  Leg abscess/left leg cellulitis-in the setting of undiagnosed diabetes mellitus.  She has undergone incision and drainage.  Group B streptococcus is likely the primary pathogen but Citrobacter may also be playing a role.  I will switch her Zosyn to intravenous ceftriaxone.  2.  Left hallux ulcer-she has a chronic left hallux ulcer with a markedly elevated sedimentation rate but no evidence of osteomyelitis by MRI scan.  3.  Type 2 diabetes mellitus-with a new diagnosis and a hemoglobin A1c of 13.5.  4.  Morbid obesity  5.  History of hypertension  6.  Leukocytosis/neutrophilia    PLAN/RECOMMENDATIONS:   1.  Discontinue Zosyn  2.  Ceftriaxone 2 g IV daily  3.  Possible discharge tomorrow with acute outpatient care in our office starting on Friday.    As long as she continues to improve, I will tentatively plan for her to discharge tomorrow and receive a few more days of outpatient intravenous antibiotic therapy daily via peripheral IV in our office.  I read her MRI images with Dr. Weaver in radiology today.  I discussed her potential disposition with the patient herself today.  I discussed her clinical situation and disposition with Dr. Crenshaw today.  I coordinated her care today.  I spent over 40 minutes on her care today.        Rodrigo Gerber MD  9/7/2022  07:57 EDT

## 2022-09-07 NOTE — CONSULTS
Ms. Weaver gave permission for diabetes education. She is newly diagnosed with T2DM with an A1c of 13.5%. She reports she is familiar with diabetes as her mother also had Type 2. She states she has used a glucose meter and occasionally administered insulin to her mother. Discussed that due to her uncontrolled diabetes that she would most likely need insulin at discharge. She is agreeable to learn insulin administration.  Discussed and taught  about type 2 diabetes self-management, risk factors, and importance of blood glucose control to reduce complications. Target blood glucose readings and A1c goals per ADA were reviewed. Reviewed current A1c and discussed its significance. Signs, symptoms and treatment of hyperglycemia and hypoglycemia were discussed. Lifestyle changes such as physical activity with MD approval and healthy eating were encouraged. Stressed the importance of strict blood sugar control  to promote healing of her cellulitis and toe ulcer.    Meter education: Taught Ms. Weaver how to use a glucose meter. Used a demo meter and demonstrated how to prepare lancing device, obtain a sample, and perform test, safe disposal of lancets. Instructed to check expiration date and safe storage of glucose test strips. Instructed to contact her health insurance to see which meter they cover. Her provider would need to fax an order to her pharmacy for the meter and supplies. Ms. Weaver states she feels confident in using a meter at home. Discussed always checking her BG prior to insulin administration and if symptoms of hypo/hyperglycemia would occur.   Discussed and taught about current hospital insulins including the onset, peak, and duration of the insulins.Taught  the correct sites for insulin injections and the importance of rotating insulin injection site. Taught  the correct storage for opened insulin at room temperature, storage for unopened insulin in the refrigerator, and expiration dates for insulin. Also,  discussed and taught the correct disposable of sharps.  Demonstrated and taught  how to use a demo insulin syringe with a vial of normal saline vial.   Next demonstrated and taught  how to use demo pen with appropriate injection technique on the demo pillow. Encouraged to follow up with their primary caregiver and/or local pharmacist for any questions or concerns.  Ms. Weaver states she feels confident in safe administration of insulin.   Offered a free OP follow up visit but declined at this time. She plans to discuss with her PCP at her follow up visit. She has our contact information. Provided her with handouts: Where Do I Begin? Per the ADA, [facility] What is diabetes?, BG goals, A1c, Type of insulins, How to use a vial and syringe, and How to use an insulin pen. Thank you for this referral.

## 2022-09-08 VITALS
HEART RATE: 74 BPM | TEMPERATURE: 98.3 F | RESPIRATION RATE: 16 BRPM | SYSTOLIC BLOOD PRESSURE: 129 MMHG | WEIGHT: 234 LBS | HEIGHT: 67 IN | OXYGEN SATURATION: 96 % | BODY MASS INDEX: 36.73 KG/M2 | DIASTOLIC BLOOD PRESSURE: 79 MMHG

## 2022-09-08 PROBLEM — E11.59 TYPE 2 DIABETES MELLITUS WITH CIRCULATORY DISORDER, WITHOUT LONG-TERM CURRENT USE OF INSULIN (HCC): Status: ACTIVE | Noted: 2022-09-08

## 2022-09-08 LAB
ANION GAP SERPL CALCULATED.3IONS-SCNC: 11 MMOL/L (ref 5–15)
BUN SERPL-MCNC: 9 MG/DL (ref 6–20)
BUN/CREAT SERPL: 10.3 (ref 7–25)
CALCIUM SPEC-SCNC: 8.6 MG/DL (ref 8.6–10.5)
CHLORIDE SERPL-SCNC: 94 MMOL/L (ref 98–107)
CO2 SERPL-SCNC: 27 MMOL/L (ref 22–29)
CREAT SERPL-MCNC: 0.87 MG/DL (ref 0.57–1)
DEPRECATED RDW RBC AUTO: 38.5 FL (ref 37–54)
EGFRCR SERPLBLD CKD-EPI 2021: 77.3 ML/MIN/1.73
ERYTHROCYTE [DISTWIDTH] IN BLOOD BY AUTOMATED COUNT: 12.6 % (ref 12.3–15.4)
GLUCOSE BLDC GLUCOMTR-MCNC: 174 MG/DL (ref 70–130)
GLUCOSE BLDC GLUCOMTR-MCNC: 214 MG/DL (ref 70–130)
GLUCOSE BLDC GLUCOMTR-MCNC: 225 MG/DL (ref 70–130)
GLUCOSE SERPL-MCNC: 186 MG/DL (ref 65–99)
HCT VFR BLD AUTO: 36.9 % (ref 34–46.6)
HGB BLD-MCNC: 11.9 G/DL (ref 12–15.9)
MCH RBC QN AUTO: 27.5 PG (ref 26.6–33)
MCHC RBC AUTO-ENTMCNC: 32.2 G/DL (ref 31.5–35.7)
MCV RBC AUTO: 85.2 FL (ref 79–97)
PLATELET # BLD AUTO: 360 10*3/MM3 (ref 140–450)
PMV BLD AUTO: 10.8 FL (ref 6–12)
POTASSIUM SERPL-SCNC: 3.2 MMOL/L (ref 3.5–5.2)
RBC # BLD AUTO: 4.33 10*6/MM3 (ref 3.77–5.28)
SODIUM SERPL-SCNC: 132 MMOL/L (ref 136–145)
SODIUM SERPL-SCNC: 132 MMOL/L (ref 136–145)
SODIUM SERPL-SCNC: 133 MMOL/L (ref 136–145)
SODIUM SERPL-SCNC: 135 MMOL/L (ref 136–145)
WBC NRBC COR # BLD: 4.61 10*3/MM3 (ref 3.4–10.8)

## 2022-09-08 PROCEDURE — 84295 ASSAY OF SERUM SODIUM: CPT | Performed by: INTERNAL MEDICINE

## 2022-09-08 PROCEDURE — 80048 BASIC METABOLIC PNL TOTAL CA: CPT | Performed by: INTERNAL MEDICINE

## 2022-09-08 PROCEDURE — 63710000001 INSULIN LISPRO (HUMAN) PER 5 UNITS: Performed by: INTERNAL MEDICINE

## 2022-09-08 PROCEDURE — 99239 HOSP IP/OBS DSCHRG MGMT >30: CPT | Performed by: INTERNAL MEDICINE

## 2022-09-08 PROCEDURE — 25010000002 CEFTRIAXONE PER 250 MG: Performed by: INTERNAL MEDICINE

## 2022-09-08 PROCEDURE — 82962 GLUCOSE BLOOD TEST: CPT

## 2022-09-08 PROCEDURE — 85027 COMPLETE CBC AUTOMATED: CPT | Performed by: INTERNAL MEDICINE

## 2022-09-08 RX ORDER — INSULIN DETEMIR 100 [IU]/ML
15 INJECTION, SOLUTION SUBCUTANEOUS DAILY
Qty: 10 ML | Refills: 12 | Status: CANCELLED | OUTPATIENT
Start: 2022-09-08

## 2022-09-08 RX ORDER — POTASSIUM CHLORIDE 750 MG/1
40 CAPSULE, EXTENDED RELEASE ORAL ONCE
Status: COMPLETED | OUTPATIENT
Start: 2022-09-08 | End: 2022-09-08

## 2022-09-08 RX ORDER — AMOXICILLIN 250 MG
2 CAPSULE ORAL 2 TIMES DAILY
Qty: 40 TABLET | Refills: 0 | Status: SHIPPED | OUTPATIENT
Start: 2022-09-08 | End: 2022-11-02

## 2022-09-08 RX ORDER — HYDROCODONE BITARTRATE AND ACETAMINOPHEN 5; 325 MG/1; MG/1
1 TABLET ORAL EVERY 4 HOURS PRN
Qty: 24 TABLET | Refills: 0 | Status: SHIPPED | OUTPATIENT
Start: 2022-09-08 | End: 2022-09-13

## 2022-09-08 RX ORDER — LANCETS
1 EACH MISCELLANEOUS 2 TIMES DAILY
Qty: 100 EACH | Refills: 12 | Status: SHIPPED | OUTPATIENT
Start: 2022-09-08 | End: 2023-03-29 | Stop reason: SDUPTHER

## 2022-09-08 RX ORDER — BLOOD-GLUCOSE METER
1 EACH MISCELLANEOUS 2 TIMES DAILY
Qty: 1 EACH | Refills: 0 | Status: SHIPPED | OUTPATIENT
Start: 2022-09-08

## 2022-09-08 RX ADMIN — INSULIN LISPRO 4 UNITS: 100 INJECTION, SOLUTION INTRAVENOUS; SUBCUTANEOUS at 09:40

## 2022-09-08 RX ADMIN — POTASSIUM CHLORIDE 40 MEQ: 750 CAPSULE, EXTENDED RELEASE ORAL at 12:03

## 2022-09-08 RX ADMIN — HYDROCODONE BITARTRATE AND ACETAMINOPHEN 1 TABLET: 5; 325 TABLET ORAL at 06:41

## 2022-09-08 RX ADMIN — INSULIN LISPRO 4 UNITS: 100 INJECTION, SOLUTION INTRAVENOUS; SUBCUTANEOUS at 11:32

## 2022-09-08 RX ADMIN — SODIUM CHLORIDE 2 G: 900 INJECTION INTRAVENOUS at 09:41

## 2022-09-08 RX ADMIN — INSULIN LISPRO 2 UNITS: 100 INJECTION, SOLUTION INTRAVENOUS; SUBCUTANEOUS at 16:54

## 2022-09-08 RX ADMIN — Medication 10 ML: at 09:42

## 2022-09-08 RX ADMIN — HYDROCODONE BITARTRATE AND ACETAMINOPHEN 1 TABLET: 5; 325 TABLET ORAL at 11:32

## 2022-09-08 NOTE — PAYOR COMM NOTE
"Brissa Best RN  Utilization Management  P:[phone]  F:[phone]  Auth # [id number]  Michelle Billings (57 y.o. Female)             Date of Birth   1965    Social Security Number       Address   [address] [place], [place] [address]    Home Phone   [phone]    MRN   [id number]       Christian   None    Marital Status                               Admission Date   9/5/22    Admission Type   Emergency    Admitting Provider   Lisa Crenshaw MD    Attending Provider   Lisa Crenshaw MD    Department, Room/Bed   [address], S520/1       Discharge Date       Discharge Disposition   Home or Self Care    Discharge Destination                               Attending Provider: Lisa Crenshaw MD    Allergies: Sulfa Antibiotics    Isolation: None   Infection: None   Code Status: CPR   Advance Care Planning Activity    Ht: 170 cm (66.93\")   Wt: 106 kg (234 lb)    Admission Cmt: None   Principal Problem: Cellulitis of left leg [L03.116]                 Active Insurance as of 9/5/2022     Primary Coverage     Payor Plan Insurance Group Employer/Plan Group    [facility] BLUE [employer] [employer] EMPLOYEE [id number]     Payor Plan Address Payor Plan Phone Number Payor Plan Fax Number Effective Dates    [address] [phone]  1/1/2022 - None Entered    [place] [address]       Subscriber Name Subscriber Birth Date Member ID       FREDERIC BILLINGS L 6/[birth year]/1960 [id number]                 Emergency Contacts      (Rel.) Home Phone Work Phone Mobile Phone    PAOLA BILLINGSOYA (Daughter) [phone] -- --    Owen,Frederic (Spouse) [phone] -- --              Current Facility-Administered Medications   Medication Dose Route Frequency Provider Last Rate Last Admin   • acetaminophen (TYLENOL) tablet 650 mg  650 mg Oral Q4H PRN Maeve Lopez APRN   650 mg at 09/07/22 1222    Or   • acetaminophen (TYLENOL) 160 MG/5ML solution 650 mg  650 mg Oral Q4H PRN Maeve Lopez " [place], DARLENE        Or   • acetaminophen (TYLENOL) suppository 650 mg  650 mg Rectal Q4H PRN Maeve Lopez APRN       • sennosides-docusate (PERICOLACE) 8.6-50 MG per tablet 2 tablet  2 tablet Oral BID Maeve Lopez APRN   2 tablet at 09/07/22 1013    And   • polyethylene glycol (MIRALAX) packet 17 g  17 g Oral Daily PRN Maeve Lopez APRN        And   • bisacodyl (DULCOLAX) EC tablet 5 mg  5 mg Oral Daily PRN Maeve Lopez APRN        And   • bisacodyl (DULCOLAX) suppository 10 mg  10 mg Rectal Daily PRN Maeve Lopez APRN       • cefTRIAXone (ROCEPHIN) 2 g/100 mL 0.9% NS IVPB (MBP)  2 g Intravenous Q24H Rodrigo Gerber MD   2 g at 09/08/22 0941   • dextrose (D50W) (25 g/50 mL) IV injection 25 g  25 g Intravenous Q15 Min PRN Christie Walker MD       • dextrose (GLUTOSE) oral gel 15 g  15 g Oral Q15 Min PRN Christie Walker MD       • Enoxaparin Sodium (LOVENOX) syringe 40 mg  40 mg Subcutaneous Daily Maeve Loepz APRN   40 mg at 09/07/22 2140   • glucagon (human recombinant) (GLUCAGEN DIAGNOSTIC) injection 1 mg  1 mg Intramuscular Q15 Min PRN Christie Walker MD       • HYDROcodone-acetaminophen (NORCO) 5-325 MG per tablet 1 tablet  1 tablet Oral Q4H PRN Maeve Lopez APRN   1 tablet at 09/08/22 1132   • HYDROmorphone (DILAUDID) injection 0.5 mg  0.5 mg Intravenous Q2H PRN Maeve Lopez APRN        And   • naloxone (NARCAN) injection 0.4 mg  0.4 mg Intravenous Q5 Min PRN Maeve Lopez APRN       • insulin detemir (LEVEMIR) injection 10 Units  10 Units Subcutaneous Nightly Christie Walker MD   10 Units at 09/07/22 2140   • Insulin Lispro (humaLOG) injection 0-9 Units  0-9 Units Subcutaneous TID AC Christie Walker MD   4 Units at 09/08/22 1132   • sodium chloride 0.9 % flush 10 mL  10 mL Intravenous Q12H Maeve Lopez APRN   10 mL at 09/08/22 0942   • sodium chloride 0.9 %  flush 10 mL  10 mL Intravenous PRN Maeve Lopez APRN         Lab Results (last 24 hours)     Procedure Component Value Units Date/Time    Sodium [[id number]]  (Abnormal) Collected: 09/08/22 1209    Specimen: Blood Updated: 09/08/22 1259     Sodium 135 mmol/L     POC Glucose Once [[id number]]  (Abnormal) Collected: 09/08/22 1035    Specimen: Blood Updated: 09/08/22 1038     Glucose 225 mg/dL      Comment: Meter: [id number] : [id number] Meraz Inderjit       POC Glucose Once [[id number]]  (Abnormal) Collected: 09/08/22 0641    Specimen: Blood Updated: 09/08/22 0701     Glucose 214 mg/dL      Comment: Meter: [id number] : [id number] Meraz Inderjit       Sodium [[id number]]  (Abnormal) Collected: 09/08/22 0227    Specimen: Blood Updated: 09/08/22 0349     Sodium 132 mmol/L     Basic Metabolic Panel [[id number]]  (Abnormal) Collected: 09/08/22 0227    Specimen: Blood Updated: 09/08/22 0349     Glucose 186 mg/dL      BUN 9 mg/dL      Creatinine 0.87 mg/dL      Sodium 132 mmol/L      Potassium 3.2 mmol/L      Chloride 94 mmol/L      CO2 27.0 mmol/L      Calcium 8.6 mg/dL      BUN/Creatinine Ratio 10.3     Anion Gap 11.0 mmol/L      eGFR 77.3 mL/min/1.73      Comment: National Kidney Foundation and American Society of Nephrology (ASN) Task Force recommended calculation based on the Chronic Kidney Disease Epidemiology Collaboration (CKD-EPI) equation refit without adjustment for race.       Narrative:      GFR Normal >60  Chronic Kidney Disease <60  Kidney Failure <15      Sodium [[id number]]  (Abnormal) Collected: 09/08/22 0227    Specimen: Blood Updated: 09/08/22 0344     Sodium 133 mmol/L     CBC (No Diff) [[id number]]  (Abnormal) Collected: 09/08/22 0227    Specimen: Blood Updated: 09/08/22 0334     WBC 4.61 10*3/mm3      RBC 4.33 10*6/mm3      Hemoglobin 11.9 g/dL      Hematocrit 36.9 %      MCV 85.2 fL      MCH 27.5 pg      MCHC 32.2 g/dL      RDW 12.6 %      RDW-SD 38.5 fl      MPV 10.8 fL      Platelets 360  10*3/mm3     Vancomycin, Trough [[id number]]  (Abnormal) Collected: 09/07/22 1821    Specimen: Blood Updated: 09/07/22 1910     Vancomycin Trough <4.00 mcg/mL     Narrative:      Therapeutic Ranges for Vancomycin    Vancomycin Random   5.0-40.0 mcg/mL  Vancomycin Trough   5.0-20.0 mcg/mL  Vancomycin Peak     20.0-40.0 mcg/mL    Sodium [[id number]]  (Abnormal) Collected: 09/07/22 1821    Specimen: Blood Updated: 09/07/22 1855     Sodium 131 mmol/L     POC Glucose Once [[id number]]  (Abnormal) Collected: 09/07/22 1624    Specimen: Blood Updated: 09/07/22 1625     Glucose 167 mg/dL      Comment: Meter: [id number] : [id number] Kayy Lex       Blood Culture - Blood, Arm, Right [[id number]]  (Normal) Collected: 09/05/22 1500    Specimen: Blood from Arm, Right Updated: 09/07/22 1600     Blood Culture No growth at 2 days    Blood Culture - Blood, Arm, Right [[id number]]  (Normal) Collected: 09/05/22 1445    Specimen: Blood from Arm, Right Updated: 09/07/22 1600     Blood Culture No growth at 2 days        Imaging Results (Last 24 Hours)     Procedure Component Value Units Date/Time    MRI Foot Left With & Without Contrast [[id number]] Collected: 09/07/22 0948     Updated: 09/08/22 1051    Narrative:      DATE OF EXAM: 9/6/2022 7:12 PM     PROCEDURE: MRI FOOT LEFT W WO CONTRAST-     INDICATIONS: r/o osteomyelitis; L03.116-Cellulitis of left lower limb;  L97.529-Non-pressure chronic ulcer of other part of left foot with  unspecified severity; L02.416-Cutaneous abscess of left lower limb;  R73.9-Hyperglycemia, unspecified     COMPARISON: Left foot plain films 09/05/2022.     TECHNIQUE: Prior to and after the administration of 20 mL of MultiHance  intravenous contrast, multiplanar/multisequence images of the left foot  were performed according to routine MRI protocol.     FINDINGS:   History indicates great toe ulcer. There is mild edema in the distal  phalanx of the great toe on STIR images, with normal-appearing marrow  fat  in the distal phalanx. There are some minor degenerative changes of  the first IP joint. No marrow edema or marrow fat replacement is seen  elsewhere to suggest osteomyelitis. There is no evidence of acute or  healing trauma. There is some generalized subcutaneous edema, but no  evidence of a discrete, drainable abscess. Small field-of-view images  show particularly dense edema associated with the plantar ulcer at the  level of the great toe, and again no evidence of a drainable collection.  Postcontrast images show expected enhancement of the plantar soft  tissues of the great toe, no evidence of enhancing collection or other  pathologic enhancement elsewhere. Distal flexor and extensor tendons  appear to be intact.             Impression:         1. Mild reactive marrow edema in the distal first phalanx. No evidence  of osteomyelitis here or elsewhere. Associated cellulitis of the first  digit.  2. No evidence of acute or healing bony trauma.  3. Generalized lower extremity edema without evidence of a discrete  inflammatory collection.     This report was finalized on 2022 10:48 AM by Dr. Jerod Weaver MD.           Operative/Procedure Notes (last 24 hours)  Notes from 22 through 22   No notes of this type exist for this encounter.       Lisa Crenshaw MD    Physician   Hospitalist   Progress Notes      Signed   Date of Service:  22   Creation Time:  22              Signed        Expand All Collapse All[]Expand All by Default        Show:Clear all  [x]Manual[x]Template[x]Copied    Added by:  [x]Lisa Crenshaw MD      []Roberth for details         [facility] Medicine Services  PROGRESS NOTE     Patient Name: Michelle Weaver  : 1965  MRN: [id number]     Date of Admission: 2022  Primary Care Physician: Provider, No Known        Subjective      Subjective      CC:  Cellulitis, foot ulceration     HPI:  LLE not hurting.  Sensation in foot  normal .  Had MRI last night.      ROS:  General: denies fevers but having chills  CV: denies chest pain  Resp: denies shortness of breath  Abd: denies abd pain, nausea              Objective      Objective      Vital Signs:   Temp:  [98.8 °F (37.1 °C)-100.1 °F (37.8 °C)] 99.5 °F (37.5 °C)  Heart Rate:  [79-99] 87  Resp:  [16-20] 16  BP: (113-146)/(67-99) 113/71     Physical Exam:  Constitutional: No acute distress, awake, alert  Respiratory: Clear to auscultation bilaterally, respiratory effort normal   Cardiovascular: RRR,  Gastrointestinal: Positive bowel sounds, soft, nontender, nondistended  Musculoskeletal: 2+ right lower extremity edema  Psychiatric: Appropriate affect, cooperative  Neurologic: No focal neurological deficits  Skin: dressing on left shin.  Wound over L toe, no odor, no drainage     Results Reviewed:  LAB RESULTS:            Lab 09/07/22 0413 09/06/22 0305 09/05/22  1511   WBC 6.00 8.95 13.33*   HEMOGLOBIN 11.7* 11.8* 13.5   HEMATOCRIT 36.0 35.5 39.9   PLATELETS 346 364 416   NEUTROS ABS 4.53 6.53 9.42*   IMMATURE GRANS (ABS) 0.09* 0.07* 0.17*   LYMPHS ABS 0.61* 1.50 2.77   MONOS ABS 0.62 0.63 0.72   EOS ABS 0.12 0.19 0.19   MCV 85.1 84.5 82.4   SED RATE  --   --  103*   CRP  --   --  15.86*   PROCALCITONIN  --   --  0.14   LACTATE  --   --  1.7                    Lab 09/07/22  0754 09/07/22  0413 09/06/22  2315 09/06/22  2045 09/06/22  1712 09/06/22  0305 09/05/22  1511   SODIUM 126* 126*  126* 132* 128* 132* 132* 122*   POTASSIUM  --  3.5  --   --   --  3.5 3.5   CHLORIDE  --  91*  --   --   --  93* 82*   CO2  --  28.0  --   --   --  26.0 26.0   ANION GAP  --  7.0  --   --   --  13.0 14.0   BUN  --  9  --   --   --  18 16   CREATININE  --  0.95  --   --   --  1.10* 1.00   EGFR  --  69.6  --   --   --  58.4* 65.4   GLUCOSE  --  233*  --   --   --  438* 334*   CALCIUM  --  8.7  --   --   --  8.5* 9.5   HEMOGLOBIN A1C  --   --   --   --   --   --  13.50*              Lab 09/05/22  8403    TOTAL PROTEIN 8.3   ALBUMIN 3.60   GLOBULIN 4.7   ALT (SGPT) 9   AST (SGOT) 14   BILIRUBIN 0.9   ALK PHOS 114              Lab 09/06/22  0305   CHOLESTEROL 182   LDL CHOL 120*   HDL CHOL 25*   TRIGLYCERIDES 208*                  Brief Urine Lab Results      None                      Microbiology Results Abnormal      Procedure Component Value - Date/Time     Blood Culture - Blood, Arm, Right [[id number]]  (Normal) Collected: 09/05/22 1500     Lab Status: Preliminary result Specimen: Blood from Arm, Right Updated: 09/06/22 1602       Blood Culture No growth at 24 hours     Blood Culture - Blood, Arm, Right [[id number]]  (Normal) Collected: 09/05/22 1445     Lab Status: Preliminary result Specimen: Blood from Arm, Right Updated: 09/06/22 1602       Blood Culture No growth at 24 hours               Radiology results from the last 24 hours:   XR Tibia Fibula 2 View Left     Result Date: 9/5/2022  DATE OF EXAM: 9/5/2022 2:25 PM  PROCEDURE: XR TIBIA FIBULA 2 VW LEFT-  INDICATIONS: cellulitis with large abscess / blister to medial ankle  COMPARISON: No comparisons available.  TECHNIQUE: 4 images of the left tibia and fibula  FINDINGS: There is a protuberant 4.5 cm rounded soft tissue density at the medial distal leg likely reflecting reported blister. There is subcutaneous fat stranding along the medial mid and distal leg near the blister. Otherwise unremarkable appearance of the soft tissues. No soft tissue gas. No acute osseous findings. No bony erosion or periostitis.       Impression: There is a protuberant 4.5 cm rounded soft tissue density at the medial distal leg likely reflecting reported blister. There is subcutaneous fat stranding along the medial mid and distal leg near the blister. Otherwise unremarkable appearance of the soft tissues. No soft tissue gas. No acute osseous findings. No bony erosion or periostitis.  This report was finalized on 9/5/2022 3:16 PM by Caleb Alonso MD.       XR Foot 3+ View  Left     Result Date: 9/5/2022  DATE OF EXAM: 9/5/2022 5:20 PM  PROCEDURE: XR FOOT 3+ VW LEFT-  INDICATIONS: left great toe ulcer; L03.116-Cellulitis of left lower limb; L97.529-Non-pressure chronic ulcer of other part of left foot with unspecified severity; L02.416-Cutaneous abscess of left lower limb; R73.9-Hyperglycemia, unspecified  COMPARISON: No comparisons available.  TECHNIQUE: 3 images of the left foot  FINDINGS: There is soft tissue swelling/wound of the great toe without radiopaque foreign body or definite soft tissue gas. There is dorsal foot soft tissue swelling. No acute fracture or traumatic malalignment. The Lisfranc joint is congruent though suboptimally assessed on nonweightbearing radiographs. No definite bony erosion or periostitis to suggest osteomyelitis. Vague ovoid 6 mm lucency with rim sclerosis in the proximal shaft of the fifth toe proximal phalanx is compatible with enchondroma.       Impression: Prominent soft tissue swelling of the great toe as well as dorsal foot soft tissue swelling. No radiographic evidence of osteomyelitis. If there is persistent clinical concern for osteomyelitis, dedicated MRI would be recommended.  This report was finalized on 9/5/2022 7:35 PM by Caleb Alonso MD.              I have reviewed the medications:  Scheduled Meds:cefTRIAXone, 2 g, Intravenous, Q24H  enoxaparin, 40 mg, Subcutaneous, Daily  insulin detemir, 10 Units, Subcutaneous, Nightly  insulin lispro, 0-9 Units, Subcutaneous, TID AC  senna-docusate sodium, 2 tablet, Oral, BID  sodium chloride, 10 mL, Intravenous, Q12H        Continuous Infusions:dextrose, 50 mL/hr, Last Rate: Stopped (09/07/22 0612)        PRN Meds:.•  acetaminophen **OR** acetaminophen **OR** acetaminophen  •  senna-docusate sodium **AND** polyethylene glycol **AND** bisacodyl **AND** bisacodyl  •  dextrose  •  dextrose  •  glucagon (human recombinant)  •  HYDROcodone-acetaminophen  •  HYDROmorphone **AND** naloxone  •  sodium  "chloride           Assessment & Plan     Assessment & Plan            Active Hospital Problems     Diagnosis   POA   • **Cellulitis of left leg [L03.116]   Yes   • Toe ulcer (HCC) [L97.509]   Unknown   • Anxiety [F41.9]   Unknown   • Panic attacks [F41.0]   Unknown   • Primary hypertension [I10]   Unknown   • Hyperglycemia [R73.9]   Unknown       Resolved Hospital Problems   No resolved problems to display.      Michelle Weaver is a 57-year-old female who presents with 4-day history of left lower extremity pain, swelling, and ulceration after use of essential oil and heating pad and fell asleep.  Developed blister which is worsened.  Found to have LLE cellulitis/abscess.  Also ulceration to left plantar surface of great toe.  Will admit to hospitalist for further management.     **patient wishes to be a \"privacy patient\".  Registration to list as privacy case.  She wishes for no family in the room while being examined or any medical discussions underway including  or children.  She states that anything they need to know she will tell them.**     LLE shin wound cellulitis in the setting of uncontrolled diabetes  LLE Abscess s/p I&D in ED  Lt great toe ulceration  -- X-ray left foot and left tib-fib:  No osteo, but pedal/toe swelling  - Right tib-fib shows medial distal soft tissue stranding  - CRP significantly elevated at 15,000, sed rate elevated at 103.  -Blood cultures remain negative  -Wound cultures growing gram variable bacilli, gram-positive cocci  -- Continue IV zosyn per ID  -- await MRI read      Hyponatremia  - 122 on admission, went to 132, so D5W started; now stable at 126; will stop D5W  - monitor q6h      Diabetes Mellitus - new diagnosis A1c 13.5  -- A1c 13.5, this is a new diagnosis DM type II  -- Consult dietitian  --  Diabetes educator consult  -  Increase Levemir to 10 units nightly, suspect she will likely need this increased  -- Will likely need insulin at discharge and she is aware of " this/agreeable to learn.     Dehydration (POA)  - better   Anxiety/panic attacks  Grief/Death of brother 2 wks ago   -- Reports no current medications, stable     HTN  -- Continue home BP meds and monitor     DVT prophylaxis: RLE sequential, Lovenox  Medical and mechanical DVT prophylaxis orders are present.      AM-PAC 6 Clicks Score (PT): 24 (09/06/22 2100)     CODE STATUS:       Code Status and Medical Interventions:   Ordered at: 09/05/22 1833     Level Of Support Discussed With:     Patient     Code Status (Patient has no pulse and is not breathing):     CPR (Attempt to Resuscitate)     Medical Interventions (Patient has pulse or is breathing):     Full Support      This patient's problems and plans were partially entered by my partner and updated as appropriate by me 09/07/22. Today is my first day evaluating this patient's active medical problems. I Personally reviewed chart and adjusted note to reflect daily changes in management/clinical condition        Lisa Crenshaw MD  09/07/22                                        Rodrigo Gerber MD   Physician  Infectious Disease  Progress Notes     Signed  Date of Service:  09/07/22 0757  Creation Time:  09/07/22 0757          Signed      Expand All Collapse AllExpand All by Default        Show:Clear all  ManualTemplateCopied    Added by:  Rodrigo Gerber MD      [facility] for details    INFECTIOUS DISEASE Progress Note     Michelle Weaver  1965  [id number]     Admission Date: 9/5/2022        Requesting Provider: José Miguel Chin DO  Evaluating Physician: Rodrigo Gerber MD     Reason for Consultation: Left leg cellulitis     History of present illness:    9/6/22: Patient is a 57 y.o. female with a history of obesity, hypertension,  and anxiety/panic attacks who is seen today for evaluation of cellulitis.  She presented to the emergency room yesterday with a four-day history of left leg pain, erythema, and ulceration.  She initially developed an ulceration of  the left great toe 4-5 months ago.  This has failed to heal.  She developed more recent rapid onset of left medial distal leg erythema, swelling, and purulence which has progressed since last Wednesday.  She presented to the emergency room yesterday and was found to have a sedimentation rate of 103, C-reactive protein of 15.9, hemoglobin A1c of 13.5, and white blood cell count of 13.3. She was found to have a 4 cm left leg abscess and underwent incision and drainage by the emergency room provider.Gram stain of her left ankle wound revealed many white blood cells with many gram-positive cocci in pairs and chains, few gram-positive cocci in groups, and few gram variable bacilli. She was started on intravenous vancomycin and Zosyn therapy.   Her left leg wound cultures are now reported to be growing group B streptococcus and gram-negative bacilli.     9/7/22: Maximum temperature over the last 24 hours is 100.1°. Her left ankle wound culture is growing Citrobacter and group B streptococcus.  The Citrobacter is sensitive to ceftriaxone. Blood cultures from 9/5 have remained negative.  She has decreased left leg pain.  She denies nausea, vomiting, and diarrhea.  She denies cough and sputum production.  Her bilateral lower extremity ABIs are normal.          Medical History  Past Medical History:  Diagnosis Date  • Anxiety 9/5/2022  • Panic attacks 9/5/2022  • Primary hypertension 9/5/2022            Surgical History  Past Surgical History:  Procedure Laterality Date  • BREAST CYST EXCISION Right      approx 2017            Family History  Problem Relation Age of Onset  • Cancer Mother    • No Known Problems Daughter    • No Known Problems Daughter    • No Known Problems Daughter    • No Known Problems Daughter            Social History  Social History       Socioeconomic History  • Marital status:   Tobacco Use  • Smoking status: Never Smoker  • Smokeless tobacco: Never Used  Vaping Use  • Vaping Use: Never  used  Substance and Sexual Activity  • Alcohol use: No  • Drug use: No  • Sexual activity: Defer      Comment: .  Lives with             Allergies  Allergen Reactions  • Sulfa Antibiotics Rash           Medication:     Current Facility-Administered Medications:   •  acetaminophen (TYLENOL) tablet 650 mg, 650 mg, Oral, Q4H PRN, 650 mg at 09/07/22 0437 **OR** acetaminophen (TYLENOL) 160 MG/5ML solution 650 mg, 650 mg, Oral, Q4H PRN **OR** acetaminophen (TYLENOL) suppository 650 mg, 650 mg, Rectal, Q4H PRN, Maeve Lopez, DARLENE  •  sennosides-docusate (PERICOLACE) 8.6-50 MG per tablet 2 tablet, 2 tablet, Oral, BID, 2 tablet at 09/06/22 2122 **AND** polyethylene glycol (MIRALAX) packet 17 g, 17 g, Oral, Daily PRN **AND** bisacodyl (DULCOLAX) EC tablet 5 mg, 5 mg, Oral, Daily PRN **AND** bisacodyl (DULCOLAX) suppository 10 mg, 10 mg, Rectal, Daily PRN, Maeve Lopez, DARLENE  •  dextrose (D50W) (25 g/50 mL) IV injection 25 g, 25 g, Intravenous, Q15 Min PRN, Christie Walker MD  •  dextrose (D5W) 5 % infusion, 50 mL/hr, Intravenous, Continuous, Kayleigh North, APRN, Stopped at 09/07/22 0612  •  dextrose (GLUTOSE) oral gel 15 g, 15 g, Oral, Q15 Min PRN, Christie Walker MD  •  Enoxaparin Sodium (LOVENOX) syringe 40 mg, 40 mg, Subcutaneous, Daily, Maeve Lopez APRN, 40 mg at 09/06/22 2114  •  glucagon (human recombinant) (GLUCAGEN DIAGNOSTIC) injection 1 mg, 1 mg, Intramuscular, Q15 Min PRN, Christie Walker MD  •  HYDROcodone-acetaminophen (NORCO) 5-325 MG per tablet 1 tablet, 1 tablet, Oral, Q4H PRN, Maeve Lopez APRN  •  HYDROmorphone (DILAUDID) injection 0.5 mg, 0.5 mg, Intravenous, Q2H PRN **AND** naloxone (NARCAN) injection 0.4 mg, 0.4 mg, Intravenous, Q5 Min PRN, Maeve Lopze, DARLENE  •  insulin detemir (LEVEMIR) injection 10 Units, 10 Units, Subcutaneous, Nightly, Christie Walker MD, 10 Units at 09/06/22 2115  •  Insulin Lispro  (humaLOG) injection 0-9 Units, 0-9 Units, Subcutaneous, TID AC, Christie Walker MD, 4 Units at 22 0739  •  piperacillin-tazobactam (ZOSYN) 3.375 g in iso-osmotic dextrose 50 ml (premix), 3.375 g, Intravenous, Q8H, Maeve Lopez APRN, Last Rate: 12.5 mL/hr at 22 0129, 3.375 g at 22 0129  •  sodium chloride 0.9 % flush 10 mL, 10 mL, Intravenous, Q12H, Maeve Lopez APRN, 10 mL at 223  •  sodium chloride 0.9 % flush 10 mL, 10 mL, Intravenous, PRN, Maeve Loepz APRN     Antibiotics:    Anti-Infectives (From admission, onward)    Ordered     Dose/Rate Route Frequency Start Stop    22 193     piperacillin-tazobactam (ZOSYN) 3.375 g in iso-osmotic dextrose 50 ml (premix)       Note to Pharmacy: Please time from first dose given in Er today  Ordering Provider: Maeve Lopez APRN   3.375 g  over 4 Hours Intravenous Every 8 Hours 22 0200 22 0159    22 1936     piperacillin-tazobactam (ZOSYN) 3.375 g in iso-osmotic dextrose 50 ml (premix)       Ordering Provider: Maeve Lopez APRN   3.375 g  over 30 Minutes Intravenous Once 22 2030 22 2312    22 1418     vancomycin 1750 mg/500 mL 0.9% NS IVPB ([facility])       Ordering Provider: Andrew oNrth APRN   20 mg/kg × 85.7 kg Intravenous Once 22 1420 22 1730    22 1418     piperacillin-tazobactam (ZOSYN) 3.375 g in iso-osmotic dextrose 50 ml (premix)       Ordering Provider: Andrew North APRN   3.375 g  over 30 Minutes Intravenous Once 22 1420 22 1600                Review of Systems:  See HPI     Physical Exam:   Vital Signs  Temp (24hrs), Av.3 °F (37.4 °C), Min:98.8 °F (37.1 °C), Max:100.1 °F (37.8 °C)     Temp  Min: 98.8 °F (37.1 °C)  Max: 100.1 °F (37.8 °C)  BP  Min: 113/71  Max: 146/78  Pulse  Min: 79  Max: 99  Resp  Min: 16  Max: 20  SpO2  Min: 92 %  Max: 100 %     GENERAL: Awake and alert, in no acute distress.    HEENT: Normocephalic, atraumatic.  PERRL. EOMI. No conjunctival injection. No icterus. Oropharynx clear without evidence of thrush or exudate.   NECK: Supple without nuchal rigidity. No mass.  LYMPH: No cervical, axillary or inguinal lymphadenopathy.  HEART: RRR; No murmur, rubs, gallops.   LUNGS: Clear to auscultation bilaterally without wheezing, rales, rhonchi. Normal respiratory effort. Nonlabored. No dullness.  ABDOMEN: Soft, nontender, nondistended. No rebound or guarding. NO mass or HSM.  EXT: She has chronic callused/ulcer over much of her left hallux.  The depth of this cannot be assessed.  There is no purulent drainage.  She has a 4-5 cm diameter ruptured bullous lesion with decreased purulent drainage over the medial distal left leg with decreased surrounding erythema.  :  Without Reed catheter.  MSK: No focal joint swelling or erythema  SKIN: No diffuse rash  NEURO: Oriented to PPT.  Motor 5/5 strength bilaterally  PSYCHIATRIC: Normal insight and judgement. Cooperative with PE     Laboratory Data       Results from last 7 days  Lab Units 09/07/22  0413 09/06/22  0305 09/05/22  1511  WBC 10*3/mm3 6.00 8.95 13.33*  HEMOGLOBIN g/dL 11.7* 11.8* 13.5  HEMATOCRIT % 36.0 35.5 39.9  PLATELETS 10*3/mm3 346 364 416       Results from last 7 days  Lab Units 09/07/22  0413  SODIUM mmol/L 126*  126*  POTASSIUM mmol/L 3.5  CHLORIDE mmol/L 91*  CO2 mmol/L 28.0  BUN mg/dL 9  CREATININE mg/dL 0.95  GLUCOSE mg/dL 233*  CALCIUM mg/dL 8.7       Results from last 7 days  Lab Units 09/05/22  1511  ALK PHOS U/L 114  BILIRUBIN mg/dL 0.9  ALT (SGPT) U/L 9  AST (SGOT) U/L 14       Results from last 7 days  Lab Units 09/05/22  1511  SED RATE mm/hr 103*       Results from last 7 days  Lab Units 09/05/22  1511  CRP mg/dL 15.86*       Results from last 7 days  Lab Units 09/05/22  1511  LACTATE mmol/L 1.7           Results from last 7 days  Lab Units 09/05/22  1740  VANCOMYCIN RM mcg/mL 25.40     Estimated Creatinine Clearance:  82.2 mL/min (by C-G formula based on SCr of 0.95 mg/dL).        Microbiology:  No results found for: ACANTHNAEG, AFBCX, BPERTUSSISCX, BLOODCX  No results found for: BCIDPCR, CXREFLEX, CSFCX, CULTURETIS  No results found for: CULTURES, HSVCX, URCX  No results found for: EYECULTURE, GCCX, HSVCULTURE, LABHSV  No results found for: LEGIONELLA, MRSACX, MUMPSCX, MYCOPLASCX  No results found for: NOCARDIACX, STOOLCX  No results found for: THROATCX, UNSTIMCULT, URINECX, CULTURE, VZVCULTUR  No results found for: VIRALCULTU, WOUNDCX           Radiology:    Imaging Results (Last 72 Hours)     Procedure Component Value Units Date/Time    MRI Foot Left With & Without Contrast [[id number]] Resulted: 09/06/22 1913      Updated: 09/06/22 1935    XR Foot 3+ View Left [[id number]] Collected: 09/05/22 1933      Updated: 09/05/22 1939    Narrative:      DATE OF EXAM: 9/5/2022 5:20 PM     PROCEDURE: XR FOOT 3+ VW LEFT-     INDICATIONS: left great toe ulcer; L03.116-Cellulitis of left lower  limb; L97.529-Non-pressure chronic ulcer of other part of left foot with  unspecified severity; L02.416-Cutaneous abscess of left lower limb;  R73.9-Hyperglycemia, unspecified     COMPARISON: No comparisons available.     TECHNIQUE: 3 images of the left foot     FINDINGS:  There is soft tissue swelling/wound of the great toe without radiopaque  foreign body or definite soft tissue gas. There is dorsal foot soft  tissue swelling. No acute fracture or traumatic malalignment. The  Lisfranc joint is congruent though suboptimally assessed on  nonweightbearing radiographs. No definite bony erosion or periostitis to  suggest osteomyelitis. Vague ovoid 6 mm lucency with rim sclerosis in  the proximal shaft of the fifth toe proximal phalanx is compatible with  enchondroma.        Impression:      Prominent soft tissue swelling of the great toe as well as dorsal foot  soft tissue swelling. No radiographic evidence of osteomyelitis. If  there is persistent  clinical concern for osteomyelitis, dedicated MRI  would be recommended.     This report was finalized on 9/5/2022 7:35 PM by Caleb Alonso MD.       XR Tibia Fibula 2 View Left [[id number]] Collected: 09/05/22 1513      Updated: 09/05/22 1519    Narrative:      DATE OF EXAM: 9/5/2022 2:25 PM     PROCEDURE: XR TIBIA FIBULA 2 VW LEFT-     INDICATIONS: cellulitis with large abscess / blister to medial ankle     COMPARISON: No comparisons available.     TECHNIQUE: 4 images of the left tibia and fibula     FINDINGS:  There is a protuberant 4.5 cm rounded soft tissue density at the medial  distal leg likely reflecting reported blister. There is subcutaneous fat  stranding along the medial mid and distal leg near the blister.  Otherwise unremarkable appearance of the soft tissues. No soft tissue  gas. No acute osseous findings. No bony erosion or periostitis.        Impression:      There is a protuberant 4.5 cm rounded soft tissue density at the medial  distal leg likely reflecting reported blister. There is subcutaneous fat  stranding along the medial mid and distal leg near the blister.  Otherwise unremarkable appearance of the soft tissues. No soft tissue  gas. No acute osseous findings. No bony erosion or periostitis.      This report was finalized on 9/5/2022 3:16 PM by Caleb Alonso MD.          I read her MRI scan with Dr. Weaver in radiology and he thinks that there is no evidence of osteomyelitis.        Impression:   1.  Left Leg abscess/left leg cellulitis-in the setting of undiagnosed diabetes mellitus.  She has undergone incision and drainage.  Group B streptococcus is likely the primary pathogen but Citrobacter may also be playing a role.  I will switch her Zosyn to intravenous ceftriaxone.  2.  Left hallux ulcer-she has a chronic left hallux ulcer with a markedly elevated sedimentation rate but no evidence of osteomyelitis by MRI scan.  3.  Type 2 diabetes mellitus-with a new diagnosis and a hemoglobin  A1c of 13.5.  4.  Morbid obesity  5.  History of hypertension  6.  Leukocytosis/neutrophilia     PLAN/RECOMMENDATIONS:   1.  Discontinue Zosyn  2.  Ceftriaxone 2 g IV daily  3.  Possible discharge tomorrow with acute outpatient care in our office starting on Friday.     As long as she continues to improve, I will tentatively plan for her to discharge tomorrow and receive a few more days of outpatient intravenous antibiotic therapy daily via peripheral IV in our office.  I read her MRI images with Dr. Weaver in radiology today.  I discussed her potential disposition with the patient herself today.  I discussed her clinical situation and disposition with Dr. Crenshaw today.  I coordinated her care today.  I spent over 40 minutes on her care today.           Rodrigo Gerber MD  9/7/2022  07:57 EDT

## 2022-09-08 NOTE — DISCHARGE SUMMARY
[facility] Medicine Services  DISCHARGE SUMMARY    Patient Name: Michelle Weaver  : 1965  MRN: [id number]    Date of Admission: 2022 12:15 PM  Date of Discharge:  2022  Primary Care Physician: Sheila, Zainab Fall - Milad Patterson to meet her next week     Consults     Date and Time Order Name Status Description    2022 12:34 AM Inpatient Infectious Diseases Consult Completed           Hospital Course     Presenting Problem:   Cellulitis of left leg [L03.116]    Active Hospital Problems    Diagnosis  POA   • **Cellulitis of left leg [L03.116]  Yes   • Type 2 diabetes mellitus with circulatory disorder, without long-term current use of insulin (HCC) [E11.59]  Unknown   • Toe ulcer (HCC) [L97.509]  Unknown   • Anxiety [F41.9]  Unknown   • Panic attacks [F41.0]  Unknown   • Primary hypertension [I10]  Unknown   • Hyperglycemia [R73.9]  Unknown      Resolved Hospital Problems   No resolved problems to display.          Hospital Course:  Michelle Weaver is a 57 y.o. female with PMH of HTN, anxiety, overweight.  She does not have a PCP.  She came to ED with four days of LLE pain and redness.  She has had a wound (blister, then callous, then ulcer) of left great toe since April. Four days ago she had aching legs, rubbed essential oils on her legs and fell asleep with a heating pad on her left leg.  She awoke to find a blister and erythema of the left calf, which has worsened over the past days.  She has been having chills at home.      LLE cellulitis, L great toe chronic ulcer:  Antibiotics were started. ID was consulted.  Culture from wound grew group B strep and Citrobacter.  The cellulitis improved.  Her chills improved as well.   She is going home still on IV ceftriaxone, which she will get infused at [facility] with Dr JERRICA Gerber following up.      New dx DM2:  She had hyperglycemia on arrival (severe) and a hgb a1C of 13.  She met with diabetic educator and learned the basics  of diabetes care.  She felt comfortable giving herself insulin.  She is discharged on a simple regimen, 30 units Levemir QHS (pen).  She is prescribed a glucometer and glucose test strips.  A PCP was obtained for her and an appointment made with the PCP.  Metformin was considered but not started now because she has some nausea and poor appetite.     HTN:  Because her BP was on the low side, her HTN were held.  These may need to be restarted in the near future.  However, since she did not have a PCP recently, I am not sure she has been taking them.      Hyponatremia:  This was partly due to dehdyration and partly to elevated glucose.  It improved to 132 at discharge.    Grief:  She lost her brother 2 weeks ago.  Her appetite has not been good.        Discharge Follow Up Recommendations for outpatient labs/diagnostics:   *FU with PCP Milad Patterson next week.  Review diabetes care and     Day of Discharge     HPI:   Feels better and feels ready to go home.  Her chills have subsided.  Her LLE cellulitis is better with decreasdd pain and redness.     Review of Systems  Gen- No fevers, chills  CV- No chest pain, palpitations  Resp- No cough, dyspnea  GI- No N/V/D, abd pain  '      Vital Signs:          Physical Exam:  Constitutional: Awake, alert in bed.  Very pleasant   Eyes: PER, no conjunctival injection  HENT: NCAT, mucous membranes moist  Neck: Supple,  trachea midline  Respiratory: Clear to auscultation bilaterally, nonlabored respirations   Cardiovascular: RRR, no murmurs, rubs, or gallops, palpable pedal pulses bilaterally  Gastrointestinal: Positive bowel sounds, soft, nontender, nondistended  Musculoskeletal: LLE erythema better.  L great toe with dry ulcer surrounded by thickened skin, no discharge or odor.  Psychiatric: Appropriate affect, cooperative  Neurologic: Oriented x 3, strength symmetric in all extremities, Cranial Nerves grossly intact to confrontation, speech clear  Skin: No rashes      Pertinent   and/or Most Recent Results     LAB RESULTS:      Lab 09/08/22  0227 09/07/22  0413 09/06/22  0305 09/05/22  1511   WBC 4.61 6.00 8.95 13.33*   HEMOGLOBIN 11.9* 11.7* 11.8* 13.5   HEMATOCRIT 36.9 36.0 35.5 39.9   PLATELETS 360 346 364 416   NEUTROS ABS  --  4.53 6.53 9.42*   IMMATURE GRANS (ABS)  --  0.09* 0.07* 0.17*   LYMPHS ABS  --  0.61* 1.50 2.77   MONOS ABS  --  0.62 0.63 0.72   EOS ABS  --  0.12 0.19 0.19   MCV 85.2 85.1 84.5 82.4   SED RATE  --   --   --  103*   CRP  --   --   --  15.86*   PROCALCITONIN  --   --   --  0.14   LACTATE  --   --   --  1.7         Lab 09/08/22  1209 09/08/22  0227 09/07/22  1821 09/07/22  1238 09/07/22  0754 09/07/22  0413 09/06/22  1712 09/06/22  0305 09/05/22  1511   SODIUM 135* 132*  132*  133* 131* 132* 126* 126*  126*   < > 132* 122*   POTASSIUM  --  3.2*  --   --   --  3.5  --  3.5 3.5   CHLORIDE  --  94*  --   --   --  91*  --  93* 82*   CO2  --  27.0  --   --   --  28.0  --  26.0 26.0   ANION GAP  --  11.0  --   --   --  7.0  --  13.0 14.0   BUN  --  9  --   --   --  9  --  18 16   CREATININE  --  0.87  --   --   --  0.95  --  1.10* 1.00   EGFR  --  77.3  --   --   --  69.6  --  58.4* 65.4   GLUCOSE  --  186*  --   --   --  233*  --  438* 334*   CALCIUM  --  8.6  --   --   --  8.7  --  8.5* 9.5   HEMOGLOBIN A1C  --   --   --   --   --   --   --   --  13.50*    < > = values in this interval not displayed.         Lab 09/05/22  1511   TOTAL PROTEIN 8.3   ALBUMIN 3.60   GLOBULIN 4.7   ALT (SGPT) 9   AST (SGOT) 14   BILIRUBIN 0.9   ALK PHOS 114             Lab 09/06/22  0305   CHOLESTEROL 182   LDL CHOL 120*   HDL CHOL 25*   TRIGLYCERIDES 208*             Brief Urine Lab Results     None        Microbiology Results (last 10 days)     Procedure Component Value - Date/Time    Wound Culture - Wound, Ankle, Left [[id number]]  (Abnormal)  (Susceptibility) Collected: 09/05/22 1740    Lab Status: Final result Specimen: Wound from Ankle, Left Updated: 09/07/22 0729     Wound Culture  Moderate growth (3+) Citrobacter koseri      Heavy growth (4+) Streptococcus agalactiae (Group B)     Comment: This organism is considered to be universally susceptible to penicillin.  No further antibiotic testing will be performed. If Clindamycin or Erythromycin is the drug of choice, notify the laboratory within 7 days to request susceptibility testing.        Gram Stain Many (4+) WBCs seen      Few (2+) Epithelial cells seen      Many (4+) Gram positive cocci in pairs and chains      Few (2+) Gram positive cocci in groups      Few (2+) Gram variable bacilli    Susceptibility      Citrobacter koseri      HECTOR      Cefepime Susceptible      Ceftazidime Susceptible      Ceftriaxone Susceptible      Gentamicin Susceptible      Levofloxacin Susceptible      Piperacillin + Tazobactam Susceptible      Tetracycline Susceptible      Trimethoprim + Sulfamethoxazole Susceptible                       Susceptibility Comments     Citrobacter koseri    Cefazolin sensitivity will not be reported for Enterobacteriaceae in non-urine isolates. If cefazolin is preferred, please call the microbiology lab to request an E-test.  With the exception of urinary-sourced infections, aminoglycosides should not be used as monotherapy.             Blood Culture - Blood, Arm, Right [[id number]]  (Normal) Collected: 09/05/22 1500    Lab Status: Preliminary result Specimen: Blood from Arm, Right Updated: 09/09/22 1603     Blood Culture No growth at 4 days    Blood Culture - Blood, Arm, Right [[id number]]  (Normal) Collected: 09/05/22 1445    Lab Status: Preliminary result Specimen: Blood from Arm, Right Updated: 09/09/22 1603     Blood Culture No growth at 4 days          XR Tibia Fibula 2 View Left    Result Date: 9/5/2022  DATE OF EXAM: 9/5/2022 2:25 PM  PROCEDURE: XR TIBIA FIBULA 2 VW LEFT-  INDICATIONS: cellulitis with large abscess / blister to medial ankle  COMPARISON: No comparisons available.  TECHNIQUE: 4 images of the left tibia and  fibula  FINDINGS: There is a protuberant 4.5 cm rounded soft tissue density at the medial distal leg likely reflecting reported blister. There is subcutaneous fat stranding along the medial mid and distal leg near the blister. Otherwise unremarkable appearance of the soft tissues. No soft tissue gas. No acute osseous findings. No bony erosion or periostitis.      There is a protuberant 4.5 cm rounded soft tissue density at the medial distal leg likely reflecting reported blister. There is subcutaneous fat stranding along the medial mid and distal leg near the blister. Otherwise unremarkable appearance of the soft tissues. No soft tissue gas. No acute osseous findings. No bony erosion or periostitis.  This report was finalized on 9/5/2022 3:16 PM by Caleb Alonso MD.      XR Foot 3+ View Left    Result Date: 9/5/2022  DATE OF EXAM: 9/5/2022 5:20 PM  PROCEDURE: XR FOOT 3+ VW LEFT-  INDICATIONS: left great toe ulcer; L03.116-Cellulitis of left lower limb; L97.529-Non-pressure chronic ulcer of other part of left foot with unspecified severity; L02.416-Cutaneous abscess of left lower limb; R73.9-Hyperglycemia, unspecified  COMPARISON: No comparisons available.  TECHNIQUE: 3 images of the left foot  FINDINGS: There is soft tissue swelling/wound of the great toe without radiopaque foreign body or definite soft tissue gas. There is dorsal foot soft tissue swelling. No acute fracture or traumatic malalignment. The Lisfranc joint is congruent though suboptimally assessed on nonweightbearing radiographs. No definite bony erosion or periostitis to suggest osteomyelitis. Vague ovoid 6 mm lucency with rim sclerosis in the proximal shaft of the fifth toe proximal phalanx is compatible with enchondroma.      Prominent soft tissue swelling of the great toe as well as dorsal foot soft tissue swelling. No radiographic evidence of osteomyelitis. If there is persistent clinical concern for osteomyelitis, dedicated MRI would be  recommended.  This report was finalized on 9/5/2022 7:35 PM by Caleb Alonso MD.      MRI Foot Left With & Without Contrast    Result Date: 9/8/2022  DATE OF EXAM: 9/6/2022 7:12 PM  PROCEDURE: MRI FOOT LEFT W WO CONTRAST-  INDICATIONS: r/o osteomyelitis; L03.116-Cellulitis of left lower limb; L97.529-Non-pressure chronic ulcer of other part of left foot with unspecified severity; L02.416-Cutaneous abscess of left lower limb; R73.9-Hyperglycemia, unspecified  COMPARISON: Left foot plain films 09/05/2022.  TECHNIQUE: Prior to and after the administration of 20 mL of MultiHance intravenous contrast, multiplanar/multisequence images of the left foot were performed according to routine MRI protocol.  FINDINGS: History indicates great toe ulcer. There is mild edema in the distal phalanx of the great toe on STIR images, with normal-appearing marrow fat in the distal phalanx. There are some minor degenerative changes of the first IP joint. No marrow edema or marrow fat replacement is seen elsewhere to suggest osteomyelitis. There is no evidence of acute or healing trauma. There is some generalized subcutaneous edema, but no evidence of a discrete, drainable abscess. Small field-of-view images show particularly dense edema associated with the plantar ulcer at the level of the great toe, and again no evidence of a drainable collection. Postcontrast images show expected enhancement of the plantar soft tissues of the great toe, no evidence of enhancing collection or other pathologic enhancement elsewhere. Distal flexor and extensor tendons appear to be intact.         1. Mild reactive marrow edema in the distal first phalanx. No evidence of osteomyelitis here or elsewhere. Associated cellulitis of the first digit. 2. No evidence of acute or healing bony trauma. 3. Generalized lower extremity edema without evidence of a discrete inflammatory collection.  This report was finalized on 9/8/2022 10:48 AM by Dr. Jerod Weaver MD.       Doppler Arterial Multi Level Lower Extremity - Bilateral CAR    Result Date: 9/7/2022  · Normal waveforms and AMI's bilaterally.        Results for orders placed during the hospital encounter of 09/05/22    Doppler Arterial Multi Level Lower Extremity - Bilateral CAR    Interpretation Summary  · Normal waveforms and AMI's bilaterally.      Results for orders placed during the hospital encounter of 09/05/22    Doppler Arterial Multi Level Lower Extremity - Bilateral CAR    Interpretation Summary  · Normal waveforms and AMI's bilaterally.          Plan for Follow-up of Pending Labs/Results: I will follow up   Pending Labs     Order Current Status    Blood Culture - Blood, Arm, Right Preliminary result    Blood Culture - Blood, Arm, Right Preliminary result        Discharge Details        Discharge Medications      New Medications      Instructions Start Date   cefTRIAXone  Commonly known as: ROCEPHIN   2 g, Intravenous, Every 24 Hours      Contour Next One device   Use twice daily as directed to check blood sugar.      Contour Next Test test strip  Generic drug: glucose blood   Use as directed to check blood sugar twice daily      HYDROcodone-acetaminophen 5-325 MG per tablet  Commonly known as: NORCO   1 tablet, Oral, Every 4 Hours PRN      Levemir FlexTouch 100 UNIT/ML injection  Generic drug: insulin detemir   15 Units, Subcutaneous, Nightly      Microlet Lancets misc   Use as directed to check blood sugar twice daily      Stimulant Laxative 8.6-50 MG per tablet  Generic drug: sennosides-docusate   2 tablets, Oral, 2 Times Daily         Changes to Medications      Instructions Start Date   carvedilol 12.5 MG tablet  Commonly known as: COREG  What changed: Another medication with the same name was removed. Continue taking this medication, and follow the directions you see here.   12.5 mg, Oral, 2 Times Daily         Stop These Medications    amLODIPine 10 MG tablet  Commonly known as: NORVASC     chlorthalidone  50 MG tablet  Commonly known as: HYGROTEN     erythromycin 5 MG/GM ophthalmic ointment  Commonly known as: ROMYCIN            Allergies   Allergen Reactions   • Sulfa Antibiotics Rash         Discharge Disposition:  Home   Home or Self Care    Diet:  Hospital:  No active diet order      Activity:  Keep L great toe ulcer clean   Activity Instructions    Activity as tolerated           Restrictions or Other Recommendations:  Call PCP for any difficulties pertaining to insulin use.        CODE STATUS:    Code Status and Medical Interventions:   Ordered at: 09/05/22 9808     Level Of Support Discussed With:    Patient     Code Status (Patient has no pulse and is not breathing):    CPR (Attempt to Resuscitate)     Medical Interventions (Patient has pulse or is breathing):    Full Support       Lisa Crenshaw MD  09/10/22      Time Spent on Discharge:  I spent  40  minutes on this discharge activity which included: face-to-face encounter with the patient, reviewing the data in the system, coordination of the care with the nursing staff as well as consultants, documentation, and entering orders.

## 2022-09-08 NOTE — CASE MANAGEMENT/SOCIAL WORK
Case Management Discharge Note      Final Note: Arrangements have been made for the pt to go to the [facility] office daily for IV AB infusion.  PCP arranged and all appointments are in the AVS.         Selected Continued Care - Admitted Since 9/5/2022     Destination    No services have been selected for the patient.              Durable Medical Equipment    No services have been selected for the patient.              Dialysis/Infusion Coordination complete.    Service Provider Selected Services Address Phone Fax Patient Preferred    [facility] DISEASE OFFICE  Infusion and IV Therapy [address], [facility] [address] [phone] --          Home Medical Care    No services have been selected for the patient.              Therapy    No services have been selected for the patient.              Community Resources    No services have been selected for the patient.              Community & DME    No services have been selected for the patient.                Selected Continued Care - Episodes Includes selections from active Coordinated Care Management episodes    Rising Risk Care Management Episode start date: 9/6/2022   There are no active outsourced providers for this episode.                    Final Discharge Disposition Code: 01 - home or self-care

## 2022-09-08 NOTE — PROGRESS NOTES
INFECTIOUS DISEASE Progress Note    Michelle Weaver  1965  [id number]    Admission Date: 9/5/2022      Requesting Provider: José Miguel Chin DO  Evaluating Physician: Rodrigo Gerber MD    Reason for Consultation: Left leg cellulitis    History of present illness:    9/6/22: Patient is a 57 y.o. female with a history of obesity, hypertension,  and anxiety/panic attacks who is seen today for evaluation of cellulitis.  She presented to the emergency room yesterday with a four-day history of left leg pain, erythema, and ulceration.  She initially developed an ulceration of the left great toe 4-5 months ago.  This has failed to heal.  She developed more recent rapid onset of left medial distal leg erythema, swelling, and purulence which has progressed since last Wednesday.  She presented to the emergency room yesterday and was found to have a sedimentation rate of 103, C-reactive protein of 15.9, hemoglobin A1c of 13.5, and white blood cell count of 13.3. She was found to have a 4 cm left leg abscess and underwent incision and drainage by the emergency room provider.Gram stain of her left ankle wound revealed many white blood cells with many gram-positive cocci in pairs and chains, few gram-positive cocci in groups, and few gram variable bacilli. She was started on intravenous vancomycin and Zosyn therapy.   Her left leg wound cultures are now reported to be growing group B streptococcus and gram-negative bacilli.    9/7/22: Maximum temperature over the last 24 hours is 100.1°. Her left ankle wound culture is growing Citrobacter and group B streptococcus.  The Citrobacter is sensitive to ceftriaxone. Blood cultures from 9/5 have remained negative.  She has decreased left leg pain.  She denies nausea, vomiting, and diarrhea.  She denies cough and sputum production.  Her bilateral lower extremity ABIs are normal.    9/8/22: Her maximum temperature over the last 24 hours is 100.4.    Her white blood cell count is 4.6.  Blood cultures from 9/5 remain negative.  Wound culture from 9/5 grew Streptococcus and Citrobacter.  She is currently on ceftriaxone therapy.  She has decreased left leg pain.  She did have some nausea and vomiting.  This appears to have abated at present.  She would like to go home.    Past Medical History:   Diagnosis Date   • Anxiety 9/5/2022   • Panic attacks 9/5/2022   • Primary hypertension 9/5/2022       Past Surgical History:   Procedure Laterality Date   • BREAST CYST EXCISION Right     approx 2017       Family History   Problem Relation Age of Onset   • Cancer Mother    • No Known Problems Daughter    • No Known Problems Daughter    • No Known Problems Daughter    • No Known Problems Daughter        Social History     Socioeconomic History   • Marital status:    Tobacco Use   • Smoking status: Never Smoker   • Smokeless tobacco: Never Used   Vaping Use   • Vaping Use: Never used   Substance and Sexual Activity   • Alcohol use: No   • Drug use: No   • Sexual activity: Defer     Comment: .  Lives with        Allergies   Allergen Reactions   • Sulfa Antibiotics Rash         Medication:    Current Facility-Administered Medications:   •  acetaminophen (TYLENOL) tablet 650 mg, 650 mg, Oral, Q4H PRN, 650 mg at 09/07/22 1222 **OR** acetaminophen (TYLENOL) 160 MG/5ML solution 650 mg, 650 mg, Oral, Q4H PRN **OR** acetaminophen (TYLENOL) suppository 650 mg, 650 mg, Rectal, Q4H PRN, Maeve Lopez, APRN  •  sennosides-docusate (PERICOLACE) 8.6-50 MG per tablet 2 tablet, 2 tablet, Oral, BID, 2 tablet at 09/07/22 1013 **AND** polyethylene glycol (MIRALAX) packet 17 g, 17 g, Oral, Daily PRN **AND** bisacodyl (DULCOLAX) EC tablet 5 mg, 5 mg, Oral, Daily PRN **AND** bisacodyl (DULCOLAX) suppository 10 mg, 10 mg, Rectal, Daily PRN, Maeve Lopez, APRN  •  cefTRIAXone (ROCEPHIN) 2 g/100 mL 0.9% NS IVPB (MBP), 2 g, Intravenous, Q24H, Rodrigo Gerber MD, 2 g at 09/07/22 1014  •   dextrose (D50W) (25 g/50 mL) IV injection 25 g, 25 g, Intravenous, Q15 Min PRN, Christie Walker MD  •  dextrose (GLUTOSE) oral gel 15 g, 15 g, Oral, Q15 Min PRN, Christie Walker MD  •  Enoxaparin Sodium (LOVENOX) syringe 40 mg, 40 mg, Subcutaneous, Daily, Maeve Lopez APRN, 40 mg at 09/07/22 2140  •  glucagon (human recombinant) (GLUCAGEN DIAGNOSTIC) injection 1 mg, 1 mg, Intramuscular, Q15 Min PRN, Christie Walker MD  •  HYDROcodone-acetaminophen (NORCO) 5-325 MG per tablet 1 tablet, 1 tablet, Oral, Q4H PRN, Maeve Lopez APRN, 1 tablet at 09/08/22 0641  •  HYDROmorphone (DILAUDID) injection 0.5 mg, 0.5 mg, Intravenous, Q2H PRN **AND** naloxone (NARCAN) injection 0.4 mg, 0.4 mg, Intravenous, Q5 Min PRN, Maeve Lopez APRN  •  insulin detemir (LEVEMIR) injection 10 Units, 10 Units, Subcutaneous, Nightly, Christie Walker MD, 10 Units at 09/07/22 2140  •  Insulin Lispro (humaLOG) injection 0-9 Units, 0-9 Units, Subcutaneous, TID AC, Christie Walker MD, 2 Units at 09/07/22 1809  •  sodium chloride 0.9 % flush 10 mL, 10 mL, Intravenous, Q12H, Maeve Lopez APRN, 10 mL at 09/07/22 2141  •  sodium chloride 0.9 % flush 10 mL, 10 mL, Intravenous, PRN, Maeve Lopez APRN    Antibiotics:  Anti-Infectives (From admission, onward)    Ordered     Dose/Rate Route Frequency Start Stop    09/07/22 0800  cefTRIAXone (ROCEPHIN) 2 g/100 mL 0.9% NS IVPB (MBP)        Ordering Provider: Rodrigo Gerber MD    2 g  over 30 Minutes Intravenous Every 24 Hours 09/07/22 0900 09/17/22 0844    09/05/22 1936  piperacillin-tazobactam (ZOSYN) 3.375 g in iso-osmotic dextrose 50 ml (premix)        Ordering Provider: Maeve Lopez APRN    3.375 g  over 30 Minutes Intravenous Once 09/05/22 2030 09/05/22 2312    09/05/22 1418  vancomycin 1750 mg/500 mL 0.9% NS IVPB ([facility])        Ordering Provider: Andrew North APRN    20 mg/kg × 85.7 kg Intravenous Once  22 1420 22 1730    22 1418  piperacillin-tazobactam (ZOSYN) 3.375 g in iso-osmotic dextrose 50 ml (premix)        Ordering Provider: Andrew North APRN    3.375 g  over 30 Minutes Intravenous Once 22 1420 22 1600            Review of Systems:  See HPI    Physical Exam:   Vital Signs  Temp (24hrs), Av.4 °F (37.4 °C), Min:97.4 °F (36.3 °C), Max:100.4 °F (38 °C)    Temp  Min: 97.4 °F (36.3 °C)  Max: 100.4 °F (38 °C)  BP  Min: 111/57  Max: 167/72  Pulse  Min: 79  Max: 99  Resp  Min: 16  Max: 18  No data recorded    GENERAL: Awake and alert, in no acute distress.   HEENT: Normocephalic, atraumatic.  PERRL. EOMI. No conjunctival injection. No icterus. Oropharynx clear without evidence of thrush or exudate.   NECK: Supple without nuchal rigidity. No mass.  LYMPH: No cervical, axillary or inguinal lymphadenopathy.  HEART: RRR; No murmur, rubs, gallops.   LUNGS: Clear to auscultation bilaterally without wheezing, rales, rhonchi. Normal respiratory effort. Nonlabored. No dullness.  ABDOMEN: Soft, nontender, nondistended. No rebound or guarding. NO mass or HSM.  EXT: She has chronic callused/ulcer over much of her left hallux.  There is decreased associated erythema.  There is marked improvement in the 5 cm diameter ruptured purulent bullous lesion over her medial distal left leg.  There is decreased surrounding erythema.  :  Without Reed catheter.  MSK: No focal joint swelling or erythema  SKIN: No diffuse rash  NEURO: Oriented to PPT.  Motor 5/5 strength bilaterally  PSYCHIATRIC: Normal insight and judgement. Cooperative with PE    Laboratory Data    Results from last 7 days   Lab Units 22  0227 22  0413 22  0305   WBC 10*3/mm3 4.61 6.00 8.95   HEMOGLOBIN g/dL 11.9* 11.7* 11.8*   HEMATOCRIT % 36.9 36.0 35.5   PLATELETS 10*3/mm3 360 346 364     Results from last 7 days   Lab Units 22  0227   SODIUM mmol/L 132*  132*  133*   POTASSIUM mmol/L 3.2*   CHLORIDE mmol/L  94*   CO2 mmol/L 27.0   BUN mg/dL 9   CREATININE mg/dL 0.87   GLUCOSE mg/dL 186*   CALCIUM mg/dL 8.6     Results from last 7 days   Lab Units 09/05/22  1511   ALK PHOS U/L 114   BILIRUBIN mg/dL 0.9   ALT (SGPT) U/L 9   AST (SGOT) U/L 14     Results from last 7 days   Lab Units 09/05/22  1511   SED RATE mm/hr 103*     Results from last 7 days   Lab Units 09/05/22  1511   CRP mg/dL 15.86*     Results from last 7 days   Lab Units 09/05/22  1511   LACTATE mmol/L 1.7         Results from last 7 days   Lab Units 09/07/22  1821 09/05/22  1740   VANCOMYCIN TR mcg/mL <4.00*  --    VANCOMYCIN RM mcg/mL  --  25.40     Estimated Creatinine Clearance: 88.1 mL/min (by C-G formula based on SCr of 0.87 mg/dL).      Microbiology:  No results found for: ACANTHNAEG, AFBCX, BPERTUSSISCX, BLOODCX  No results found for: BCIDPCR, CXREFLEX, CSFCX, CULTURETIS  No results found for: CULTURES, HSVCX, URCX  No results found for: EYECULTURE, GCCX, HSVCULTURE, LABHSV  No results found for: LEGIONELLA, MRSACX, MUMPSCX, MYCOPLASCX  No results found for: NOCARDIACX, STOOLCX  No results found for: THROATCX, UNSTIMCULT, URINECX, CULTURE, VZVCULTUR  No results found for: VIRALCULTU, WOUNDCX        Radiology:  Imaging Results (Last 72 Hours)     Procedure Component Value Units Date/Time    MRI Foot Left With & Without Contrast [[id number]] Collected: 09/07/22 0948     Updated: 09/07/22 1348    Narrative:      DATE OF EXAM: 9/6/2022 7:12 PM     PROCEDURE: MRI FOOT LEFT W WO CONTRAST-     INDICATIONS: r/o osteomyleitis; L03.116-Cellulitis of left lower limb;  L97.529-Non-pressure chronic ulcer of other part of left foot with  unspecified severity; L02.416-Cutaneous abscess of left lower limb;  R73.9-Hyperglycemia, unspecified     COMPARISON: Left foot plain films 09/05/2022.     TECHNIQUE: Prior to and after the administration of 20 mL of MultiHance  intravenous contrast, multiplanar/multisequence images of the left foot  were performed according to  routine MRI protocol.     FINDINGS:   History indicates great toe ulcer. There is mild edema in the distal  phalanx of the great toe on STIR images, with normal-appearing marrow  fat in the distal phalanx. There are some minor degenerative changes of  the first IP joint. No marrow edema or marrow fat replacement is seen  elsewhere to suggest osteomyelitis. There is no evidence of acute or  healing trauma. There is some generalized subcutaneous edema, but no  evidence of a discrete, drainable abscess. Small field-of-view images  show particularly dense edema associated with the plantar ulcer at the  level of the great toe, and again no evidence of a drainable collection.  Postcontrast images show expected enhancement of the plantar soft  tissues of the great toe, no evidence of enhancing collection or other  pathologic enhancement elsewhere. Distal flexor and extensor tendons  appear to be intact.             Impression:         1. Mild reactive marrow edema in the distal first phalanx. No evidence  of osteomyelitis here or elsewhere. Associated cellulitis of the first  digit.  2. No evidence of acute or healing bony trauma.  3. Generalized lower extremity edema without evidence of a discrete  inflammatory collection.       XR Foot 3+ View Left [[id number]] Collected: 09/05/22 1933     Updated: 09/05/22 1939    Narrative:      DATE OF EXAM: 9/5/2022 5:20 PM     PROCEDURE: XR FOOT 3+ VW LEFT-     INDICATIONS: left great toe ulcer; L03.116-Cellulitis of left lower  limb; L97.529-Non-pressure chronic ulcer of other part of left foot with  unspecified severity; L02.416-Cutaneous abscess of left lower limb;  R73.9-Hyperglycemia, unspecified     COMPARISON: No comparisons available.     TECHNIQUE: 3 images of the left foot     FINDINGS:  There is soft tissue swelling/wound of the great toe without radiopaque  foreign body or definite soft tissue gas. There is dorsal foot soft  tissue swelling. No acute fracture or traumatic  malalignment. The  Lisfranc joint is congruent though suboptimally assessed on  nonweightbearing radiographs. No definite bony erosion or periostitis to  suggest osteomyelitis. Vague ovoid 6 mm lucency with rim sclerosis in  the proximal shaft of the fifth toe proximal phalanx is compatible with  enchondroma.        Impression:      Prominent soft tissue swelling of the great toe as well as dorsal foot  soft tissue swelling. No radiographic evidence of osteomyelitis. If  there is persistent clinical concern for osteomyelitis, dedicated MRI  would be recommended.     This report was finalized on 9/5/2022 7:35 PM by Caleb Alonso MD.       XR Tibia Fibula 2 View Left [[id number]] Collected: 09/05/22 1513     Updated: 09/05/22 1519    Narrative:      DATE OF EXAM: 9/5/2022 2:25 PM     PROCEDURE: XR TIBIA FIBULA 2 VW LEFT-     INDICATIONS: cellulitis with large abscess / blister to medial ankle     COMPARISON: No comparisons available.     TECHNIQUE: 4 images of the left tibia and fibula     FINDINGS:  There is a protuberant 4.5 cm rounded soft tissue density at the medial  distal leg likely reflecting reported blister. There is subcutaneous fat  stranding along the medial mid and distal leg near the blister.  Otherwise unremarkable appearance of the soft tissues. No soft tissue  gas. No acute osseous findings. No bony erosion or periostitis.        Impression:      There is a protuberant 4.5 cm rounded soft tissue density at the medial  distal leg likely reflecting reported blister. There is subcutaneous fat  stranding along the medial mid and distal leg near the blister.  Otherwise unremarkable appearance of the soft tissues. No soft tissue  gas. No acute osseous findings. No bony erosion or periostitis.      This report was finalized on 9/5/2022 3:16 PM by Caleb Alonso MD.         I read her MRI scan with Dr. Weaver in radiology and he thinks that there is no evidence of osteomyelitis.      Impression:   1.  Left  Leg abscess/left leg cellulitis-in the setting of undiagnosed diabetes mellitus.  She has undergone incision and drainage.  Group B streptococcus is likely the primary pathogen but Citrobacter may also be playing a role.  She is clinically improved.  I will plan for her to discharge today with outpatient intravenous ceftriaxone given daily via peripheral IV in our office at least until early next week.  2.  Left hallux ulcer-she has a chronic left hallux ulcer with a markedly elevated sedimentation rate but no evidence of osteomyelitis by MRI scan.  3.  Type 2 diabetes mellitus-with a new diagnosis and a hemoglobin A1c of 13.5.  4.  Morbid obesity  5.  History of hypertension  6.  Leukocytosis/neutrophilia    PLAN/RECOMMENDATIONS:   1.  Discharge to home today  2.  Ceftriaxone 2 g IV daily  3.   Acute outpatient care with intravenous ceftriaxone via peripheral IV daily in our office starting Friday 9/9    Outpatient orders:  1.  Have her come to our office Friday 9/9 at 9:00 for: Rocephin 2 g IV daily via peripheral IV  2.  Appointment with Dr. Maynor Vásquez in our office on Saturday 9/10 at 9:00-this appointment has been made    I discussed her clinical situation and disposition with Dr. Crenshaw.  I coordinated her care.      Rodrigo Gerber MD  9/8/2022  07:21 EDT

## 2022-09-09 ENCOUNTER — TRANSITIONAL CARE MANAGEMENT TELEPHONE ENCOUNTER (OUTPATIENT)
Dept: CALL CENTER | Facility: HOSPITAL | Age: 57
End: 2022-09-09

## 2022-09-09 ENCOUNTER — READMISSION MANAGEMENT (OUTPATIENT)
Dept: CALL CENTER | Facility: HOSPITAL | Age: 57
End: 2022-09-09

## 2022-09-09 NOTE — OUTREACH NOTE
Call Center TCM Note    Flowsheet Row Responses   [facility] patient discharged from? [place]   Does the patient have one of the following disease processes/diagnoses(primary or secondary)? Other   TCM attempt successful? No   Unsuccessful attempts Attempt 1   Call Status --  [no release. Patient has new patient appt with [facility] [address] 9/13/2022]          Jose Miguel Marshall RN    9/9/2022, 14:00 EDT

## 2022-09-09 NOTE — OUTREACH NOTE
Call Center TCM Note    Flowsheet Row Responses   [facility] patient discharged from? [place]   Does the patient have one of the following disease processes/diagnoses(primary or secondary)? Other   TCM attempt successful? No   Unsuccessful attempts Attempt 2          Jose Miguel Marshall RN    9/9/2022, 14:17 EDT

## 2022-09-09 NOTE — OUTREACH NOTE
Prep Survey    Flowsheet Row Responses   [facility] patient discharged from? [place]   Is LACE score < 7 ? Yes   Emergency Room discharge w/ pulse ox? No   Eligibility [facility]   Date of Admission 09/05/22   Date of Discharge 09/08/22   Discharge Disposition Home or Self Care   Discharge diagnosis cellulitis of left leg   Does the patient have one of the following disease processes/diagnoses(primary or secondary)? Other   Does the patient have Home health ordered? No   Is there a DME ordered? No   Comments regarding appointments plan [facility] office daily for IV AB infusion   Prep survey completed? Yes          DEYANIRA FORMAN - Registered Nurse

## 2022-09-10 ENCOUNTER — TRANSCRIBE ORDERS (OUTPATIENT)
Dept: LAB | Facility: HOSPITAL | Age: 57
End: 2022-09-10

## 2022-09-10 DIAGNOSIS — L03.116 CELLULITIS OF LEFT FOOT: Primary | ICD-10-CM

## 2022-09-10 DIAGNOSIS — L03.032 ABSCESS OF FIFTH TOENAIL OF LEFT FOOT: ICD-10-CM

## 2022-09-10 DIAGNOSIS — L97.529 ULCER OF LEFT FOOT, UNSPECIFIED ULCER STAGE: ICD-10-CM

## 2022-09-10 DIAGNOSIS — L02.416 ABSCESS OF LEFT HIP: ICD-10-CM

## 2022-09-10 LAB
BACTERIA SPEC AEROBE CULT: NORMAL
BACTERIA SPEC AEROBE CULT: NORMAL

## 2022-09-11 ENCOUNTER — LAB (OUTPATIENT)
Dept: LAB | Facility: HOSPITAL | Age: 57
End: 2022-09-11

## 2022-09-11 DIAGNOSIS — L03.032 ABSCESS OF FIFTH TOENAIL OF LEFT FOOT: ICD-10-CM

## 2022-09-11 DIAGNOSIS — L03.116 CELLULITIS OF LEFT FOOT: ICD-10-CM

## 2022-09-11 DIAGNOSIS — L97.529 ULCER OF LEFT FOOT, UNSPECIFIED ULCER STAGE: ICD-10-CM

## 2022-09-11 DIAGNOSIS — L02.416 ABSCESS OF LEFT HIP: ICD-10-CM

## 2022-09-11 LAB
ALBUMIN SERPL-MCNC: 3.3 G/DL (ref 3.5–5.2)
ALBUMIN/GLOB SERPL: 0.8 G/DL
ALP SERPL-CCNC: 78 U/L (ref 39–117)
ALT SERPL W P-5'-P-CCNC: 13 U/L (ref 1–33)
ANION GAP SERPL CALCULATED.3IONS-SCNC: 11 MMOL/L (ref 5–15)
AST SERPL-CCNC: 17 U/L (ref 1–32)
BASOPHILS # BLD AUTO: 0.02 10*3/MM3 (ref 0–0.2)
BASOPHILS NFR BLD AUTO: 0.5 % (ref 0–1.5)
BILIRUB SERPL-MCNC: 0.3 MG/DL (ref 0–1.2)
BUN SERPL-MCNC: 8 MG/DL (ref 6–20)
BUN/CREAT SERPL: 10 (ref 7–25)
CALCIUM SPEC-SCNC: 8.9 MG/DL (ref 8.6–10.5)
CHLORIDE SERPL-SCNC: 93 MMOL/L (ref 98–107)
CO2 SERPL-SCNC: 28 MMOL/L (ref 22–29)
CREAT SERPL-MCNC: 0.8 MG/DL (ref 0.57–1)
CRP SERPL-MCNC: 0.81 MG/DL (ref 0–0.5)
DEPRECATED RDW RBC AUTO: 40.6 FL (ref 37–54)
EGFRCR SERPLBLD CKD-EPI 2021: 85.5 ML/MIN/1.73
EOSINOPHIL # BLD AUTO: 0.04 10*3/MM3 (ref 0–0.4)
EOSINOPHIL NFR BLD AUTO: 1 % (ref 0.3–6.2)
ERYTHROCYTE [DISTWIDTH] IN BLOOD BY AUTOMATED COUNT: 12.7 % (ref 12.3–15.4)
ERYTHROCYTE [SEDIMENTATION RATE] IN BLOOD: 58 MM/HR (ref 0–30)
GLOBULIN UR ELPH-MCNC: 3.9 GM/DL
GLUCOSE SERPL-MCNC: 220 MG/DL (ref 65–99)
HCT VFR BLD AUTO: 41.2 % (ref 34–46.6)
HGB BLD-MCNC: 13 G/DL (ref 12–15.9)
IMM GRANULOCYTES # BLD AUTO: 0.02 10*3/MM3 (ref 0–0.05)
IMM GRANULOCYTES NFR BLD AUTO: 0.5 % (ref 0–0.5)
LYMPHOCYTES # BLD AUTO: 1.76 10*3/MM3 (ref 0.7–3.1)
LYMPHOCYTES NFR BLD AUTO: 44.3 % (ref 19.6–45.3)
MCH RBC QN AUTO: 27.8 PG (ref 26.6–33)
MCHC RBC AUTO-ENTMCNC: 31.6 G/DL (ref 31.5–35.7)
MCV RBC AUTO: 88 FL (ref 79–97)
MONOCYTES # BLD AUTO: 0.31 10*3/MM3 (ref 0.1–0.9)
MONOCYTES NFR BLD AUTO: 7.8 % (ref 5–12)
NEUTROPHILS NFR BLD AUTO: 1.82 10*3/MM3 (ref 1.7–7)
NEUTROPHILS NFR BLD AUTO: 45.9 % (ref 42.7–76)
NRBC BLD AUTO-RTO: 0 /100 WBC (ref 0–0.2)
PLATELET # BLD AUTO: 365 10*3/MM3 (ref 140–450)
PMV BLD AUTO: 9.9 FL (ref 6–12)
POTASSIUM SERPL-SCNC: 3.7 MMOL/L (ref 3.5–5.2)
PROT SERPL-MCNC: 7.2 G/DL (ref 6–8.5)
RBC # BLD AUTO: 4.68 10*6/MM3 (ref 3.77–5.28)
SODIUM SERPL-SCNC: 132 MMOL/L (ref 136–145)
WBC NRBC COR # BLD: 3.97 10*3/MM3 (ref 3.4–10.8)

## 2022-09-11 PROCEDURE — 86140 C-REACTIVE PROTEIN: CPT

## 2022-09-11 PROCEDURE — 80053 COMPREHEN METABOLIC PANEL: CPT

## 2022-09-11 PROCEDURE — 85025 COMPLETE CBC W/AUTO DIFF WBC: CPT

## 2022-09-11 PROCEDURE — 85652 RBC SED RATE AUTOMATED: CPT

## 2022-09-11 PROCEDURE — 36415 COLL VENOUS BLD VENIPUNCTURE: CPT

## 2022-09-12 ENCOUNTER — TRANSITIONAL CARE MANAGEMENT TELEPHONE ENCOUNTER (OUTPATIENT)
Dept: CALL CENTER | Facility: HOSPITAL | Age: 57
End: 2022-09-12

## 2022-09-12 NOTE — OUTREACH NOTE
Call Center TCM Note    Flowsheet Row Responses   [facility] patient discharged from? [place]   Does the patient have one of the following disease processes/diagnoses(primary or secondary)? Other   TCM attempt successful? Yes   Call start time 0925   Call end time 0932   Discharge diagnosis cellulitis of left leg   Person spoke with today (if not patient) and relationship Patient   Meds reviewed with patient/caregiver? Yes   Is the patient having any side effects they believe may be caused by any medication additions or changes? No   Does the patient have all medications ordered at discharge? Yes   Is the patient taking all medications as directed (includes completed medication regime)? Yes   Comments Infectious disease appt 9/9/22   Psychosocial issues? No   Did the patient receive a copy of their discharge instructions? Yes   Nursing interventions Reviewed instructions with patient   What is the patient's perception of their health status since discharge? Improving   Is the patient/caregiver able to teach back signs and symptoms related to disease process for when to call PCP? Yes   Is the patient/caregiver able to teach back signs and symptoms related to disease process for when to call 911? Yes   Is the patient/caregiver able to teach back the hierarchy of who to call/visit for symptoms/problems? PCP, Specialist, Home health nurse, Urgent Care, ED, 911 Yes   If the patient is a current smoker, are they able to teach back resources for cessation? Not a smoker   TCM call completed? Yes   Wrap up additional comments Pt states left leg is doing better, and left leg assessed on 9/9/22 by Infectious disease. Pt verified PCP hospital fu appt on 9/13/22. No questions/concerns.          Stephanie Fuchs RN    9/12/2022, 09:37 EDT

## 2022-09-13 ENCOUNTER — PATIENT ROUNDING (BHMG ONLY) (OUTPATIENT)
Dept: FAMILY MEDICINE CLINIC | Facility: CLINIC | Age: 57
End: 2022-09-13

## 2022-09-13 ENCOUNTER — OFFICE VISIT (OUTPATIENT)
Dept: FAMILY MEDICINE CLINIC | Facility: CLINIC | Age: 57
End: 2022-09-13

## 2022-09-13 ENCOUNTER — TELEPHONE (OUTPATIENT)
Dept: FAMILY MEDICINE CLINIC | Facility: CLINIC | Age: 57
End: 2022-09-13

## 2022-09-13 VITALS
BODY MASS INDEX: 35.47 KG/M2 | HEART RATE: 94 BPM | DIASTOLIC BLOOD PRESSURE: 82 MMHG | WEIGHT: 226 LBS | HEIGHT: 67 IN | OXYGEN SATURATION: 99 % | TEMPERATURE: 96.9 F | SYSTOLIC BLOOD PRESSURE: 120 MMHG

## 2022-09-13 DIAGNOSIS — I10 PRIMARY HYPERTENSION: Chronic | ICD-10-CM

## 2022-09-13 DIAGNOSIS — E11.65 TYPE 2 DIABETES MELLITUS WITH HYPERGLYCEMIA, WITHOUT LONG-TERM CURRENT USE OF INSULIN: ICD-10-CM

## 2022-09-13 DIAGNOSIS — L97.529 ULCER OF TOE OF LEFT FOOT, UNSPECIFIED ULCER STAGE: ICD-10-CM

## 2022-09-13 DIAGNOSIS — L03.116 CELLULITIS OF LEFT LEG: Primary | ICD-10-CM

## 2022-09-13 PROCEDURE — 99495 TRANSJ CARE MGMT MOD F2F 14D: CPT | Performed by: PHYSICIAN ASSISTANT

## 2022-09-13 RX ORDER — CELECOXIB 200 MG/1
200 CAPSULE ORAL DAILY
Qty: 14 CAPSULE | Refills: 0 | Status: SHIPPED | OUTPATIENT
Start: 2022-09-13 | End: 2022-09-13

## 2022-09-13 RX ORDER — IBUPROFEN 600 MG/1
600 TABLET ORAL 2 TIMES DAILY PRN
Qty: 28 TABLET | Refills: 0 | Status: SHIPPED | OUTPATIENT
Start: 2022-09-13 | End: 2022-09-27

## 2022-09-13 NOTE — TELEPHONE ENCOUNTER
Caller: Michelle Weaver    Relationship: Self    Best call back number: [phone]    What form or medical record are you requesting: [facility] PAPERWORK    Who is requesting this form or medical record from you: [facility]    How would you like to receive the form or medical records (pick-up, mail, fax): FAX    Timeframe paperwork needed: ASAP    Additional notes: PATIENT STATES THEY FAXED THE [facility] PAPERWORK OVER TO THE PROVIDER

## 2022-09-13 NOTE — PROGRESS NOTES
Transitional Care Follow Up Visit  Subjective     Michelle Weaver is a 57 y.o. female who presents for a transitional care management and initial visit.    Within 48 business hours after discharge our office contacted her via telephone to coordinate her care and needs.      I reviewed and discussed the details of that call along with the discharge summary, hospital problems, inpatient lab results, inpatient diagnostic studies, and consultation reports with Michelle.     Current outpatient and discharge medications have been reconciled for the patient.  Reviewed by: JOSE GUADALUPE Wyatt      Date of TCM Phone Call 9/9/2022   [facility]   Date of Admission 9/5/2022   Date of Discharge 9/8/2022   Discharge Disposition Home or Self Care     Risk for Readmission (LACE) Score: 6 (9/8/2022  6:00 AM)      History of Present Illness   Course During Hospital Stay:   Michelle Weaver is a 57 y.o. female with PMH of HTN, anxiety, overweight.  She does not have a PCP.  She came to ED with four days of LLE pain and redness.  She has had a wound (blister, then callous, then ulcer) of left great toe since April. Four days ago she had aching legs, rubbed essential oils on her legs and fell asleep with a heating pad on her left leg.  She awoke to find a blister and erythema of the left calf, which has worsened over the past days.  She has been having chills at home.       LLE cellulitis, L great toe chronic ulcer:  Antibiotics were started. ID was consulted.  Culture from wound grew group B strep and Citrobacter.  The cellulitis improved.  Her chills improved as well.   She is going home still on IV ceftriaxone, which she will get infused at [facility] with Dr JERRICA Gerber following up.       New dx DM2:  She had hyperglycemia on arrival (severe) and a hgb a1C of 13.  She met with diabetic educator and learned the basics of diabetes care.  She felt comfortable giving herself insulin.  She is discharged on a simple  regimen, 30 units Levemir QHS (pen).  She is prescribed a glucometer and glucose test strips.  A PCP was obtained for her and an appointment made with the PCP.  Metformin was considered but not started now because she has some nausea and poor appetite.      HTN:  Because her BP was on the low side, her HTN were held.  These may need to be restarted in the near future.  However, since she did not have a PCP recently, I am not sure she has been taking them.       Hyponatremia:  This was partly due to dehdyration and partly to elevated glucose.  It improved to 132 at discharge.     Grief:  She lost her brother 2 weeks ago.  Her appetite has not been good.       9/13/22: Feeling okay. She is following with infectious disease and was recently referred for wound care. She states she has an appointment on Thursday this week to start wound care.   She was aware of her diabetes diagnosis and was trying dietary control. She was treated with metformin in the past but states it did not bring down her glucoses. This morning her glucose was 244 fasting, yesterday 164. She states she has not started levemir yet because she did not have pen needles. She did meet with a dietician during her admission. She has been working on portion control, cutting out starches. She does not see an ophthalmologist. Reports compliance with carvedilol. She states her blood pressure has been controlled, 120s/70s at home. She states hydrocodone makes her sick so she has not been taking it. Her  works in transport at [employer]. 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. She will need [facility] paperwork completed. She states she will have this sent to our office.     The following portions of the patient's history were reviewed and updated as appropriate: allergies, current medications, past family history, past medical history, past social history, past surgical history, and problem list.    Review  "of Systems   Constitutional: Negative for appetite change, chills, diaphoresis and fever.   Respiratory: Negative for shortness of breath.    Cardiovascular: Negative for chest pain.   Gastrointestinal: Negative for nausea.   Endocrine: Negative for polydipsia and polyuria.       Objective     Vitals:    09/13/22 1127   BP: 120/82   Pulse: 94   Temp: 96.9 °F (36.1 °C)   TempSrc: Infrared   SpO2: 99%   Weight: 103 kg (226 lb)   Height: 170 cm (66.93\")     Physical Exam  Vitals reviewed.   Constitutional:       General: She is not in acute distress.     Appearance: She is well-developed. She is obese.   HENT:      Head: Normocephalic and atraumatic.   Eyes:      Conjunctiva/sclera: Conjunctivae normal.   Cardiovascular:      Rate and Rhythm: Normal rate and regular rhythm.      Heart sounds: Normal heart sounds. No murmur heard.  Pulmonary:      Effort: Pulmonary effort is normal.      Breath sounds: Normal breath sounds.   Musculoskeletal:      Cervical back: Normal range of motion.      Comments: Left great toe bandaged   Skin:     General: Skin is warm and dry.   Neurological:      Mental Status: She is alert.      Gait: Gait normal.   Psychiatric:         Speech: Speech normal.         Behavior: Behavior normal.         Results    Results for orders placed or performed in visit on 09/11/22   Comprehensive Metabolic Panel    Specimen: Blood   Result Value Ref Range    Glucose 220 (H) 65 - 99 mg/dL    BUN 8 6 - 20 mg/dL    Creatinine 0.80 0.57 - 1.00 mg/dL    Sodium 132 (L) 136 - 145 mmol/L    Potassium 3.7 3.5 - 5.2 mmol/L    Chloride 93 (L) 98 - 107 mmol/L    CO2 28.0 22.0 - 29.0 mmol/L    Calcium 8.9 8.6 - 10.5 mg/dL    Total Protein 7.2 6.0 - 8.5 g/dL    Albumin 3.30 (L) 3.50 - 5.20 g/dL    ALT (SGPT) 13 1 - 33 U/L    AST (SGOT) 17 1 - 32 U/L    Alkaline Phosphatase 78 39 - 117 U/L    Total Bilirubin 0.3 0.0 - 1.2 mg/dL    Globulin 3.9 gm/dL    A/G Ratio 0.8 g/dL    BUN/Creatinine Ratio 10.0 7.0 - 25.0    Anion " Gap 11.0 5.0 - 15.0 mmol/L    eGFR 85.5 >60.0 mL/min/1.73   Sedimentation Rate    Specimen: Blood   Result Value Ref Range    Sed Rate 58 (H) 0 - 30 mm/hr   C-reactive Protein    Specimen: Blood   Result Value Ref Range    C-Reactive Protein 0.81 (H) 0.00 - 0.50 mg/dL   CBC Auto Differential    Specimen: Blood   Result Value Ref Range    WBC 3.97 3.40 - 10.80 10*3/mm3    RBC 4.68 3.77 - 5.28 10*6/mm3    Hemoglobin 13.0 12.0 - 15.9 g/dL    Hematocrit 41.2 34.0 - 46.6 %    MCV 88.0 79.0 - 97.0 fL    MCH 27.8 26.6 - 33.0 pg    MCHC 31.6 31.5 - 35.7 g/dL    RDW 12.7 12.3 - 15.4 %    RDW-SD 40.6 37.0 - 54.0 fl    MPV 9.9 6.0 - 12.0 fL    Platelets 365 140 - 450 10*3/mm3    Neutrophil % 45.9 42.7 - 76.0 %    Lymphocyte % 44.3 19.6 - 45.3 %    Monocyte % 7.8 5.0 - 12.0 %    Eosinophil % 1.0 0.3 - 6.2 %    Basophil % 0.5 0.0 - 1.5 %    Immature Grans % 0.5 0.0 - 0.5 %    Neutrophils, Absolute 1.82 1.70 - 7.00 10*3/mm3    Lymphocytes, Absolute 1.76 0.70 - 3.10 10*3/mm3    Monocytes, Absolute 0.31 0.10 - 0.90 10*3/mm3    Eosinophils, Absolute 0.04 0.00 - 0.40 10*3/mm3    Basophils, Absolute 0.02 0.00 - 0.20 10*3/mm3    Immature Grans, Absolute 0.02 0.00 - 0.05 10*3/mm3    nRBC 0.0 0.0 - 0.2 /100 WBC       Assessment & Plan   Diagnoses and all orders for this visit:    1. Cellulitis of left leg (Primary)  -Continue IV antibiotics, management with infectious disease    2. Ulcer of toe of left foot, unspecified ulcer stage (HCC)  -Referred to wound care and has upcoming appointment    3. Type 2 diabetes mellitus with hyperglycemia, without long-term current use of insulin (HCC)  -Discussed importance of glycemic control for wound healing and preventing further diabetic complications. I sent pen needles to her pharmacy so she can start Levemir nightly  -Encouraged diabetic diet  -Discuss statin on follow-up  -Counseled the patient on annual eye exams.     4. Primary hypertension  -Adequate control  -Continue carvedilol at this  time  -Plan to discuss ACE inhibitor next visit    Other orders  -     celecoxib (CeleBREX) 200 MG capsule; Take 1 capsule by mouth Daily.  Dispense: 14 capsule; Refill: 0

## 2022-09-14 ENCOUNTER — TRANSCRIBE ORDERS (OUTPATIENT)
Dept: PHYSICAL THERAPY | Facility: HOSPITAL | Age: 57
End: 2022-09-14

## 2022-09-14 ENCOUNTER — TELEPHONE (OUTPATIENT)
Dept: FAMILY MEDICINE CLINIC | Facility: CLINIC | Age: 57
End: 2022-09-14

## 2022-09-14 DIAGNOSIS — S91.002D OPEN WOUND OF LEFT ANKLE, SUBSEQUENT ENCOUNTER: Primary | ICD-10-CM

## 2022-09-14 NOTE — TELEPHONE ENCOUNTER
Caller: Michelle Weaver    Relationship: Self    Best call back number: [phone]    What is the best time to reach you:ANYTIME     Who are you requesting to speak with (clinical staff, provider,  specific staff member) CLINICAL STAFF     What was the call regarding: PATIENT IS CALLING TO SEE IF THE OFFICE RECEIVED THE [facility] PAPERWORK THAT WAS FAXED YESTERDAY. SHE SAYS THAT IT WAS FROM [employer] [facility].  SHE WAS INSTRUCTED TO CALL BY HER EMPLOYER AND IS TO REPORT BACK TO THEM. PATIENT WOULD LIKE A C ALL BACK TO CONFIRM     Do you require a callback: YES

## 2022-09-15 ENCOUNTER — HOSPITAL ENCOUNTER (OUTPATIENT)
Dept: PHYSICAL THERAPY | Facility: HOSPITAL | Age: 57
Setting detail: THERAPIES SERIES
Discharge: HOME | End: 2022-09-15

## 2022-09-15 DIAGNOSIS — S91.002D ANKLE WOUND, LEFT, SUBSEQUENT ENCOUNTER: ICD-10-CM

## 2022-09-15 DIAGNOSIS — L03.116 CELLULITIS OF LEFT LEG: Primary | ICD-10-CM

## 2022-09-15 PROCEDURE — 97162 PT EVAL MOD COMPLEX 30 MIN: CPT

## 2022-09-15 PROCEDURE — 97597 DBRDMT OPN WND 1ST 20 CM/<: CPT

## 2022-09-15 NOTE — THERAPY EVALUATION
Outpatient Rehabilitation - Wound/Debridement Initial [facility]     Patient Name: Michelle Weaver  : 1965  MRN: [id number]  Today's Date: 9/15/2022                  Admit Date: 9/15/2022    Visit Dx:    ICD-10-CM ICD-9-CM   1. Cellulitis of left leg  L03.116 682.6   2. Ankle wound, left, subsequent encounter  S91.002D V58.89     891.0       Patient Active Problem List   Diagnosis   • Cellulitis of left leg   • Toe ulcer (HCC)   • Anxiety   • Panic attacks   • Primary hypertension   • Hyperglycemia   • Type 2 diabetes mellitus with hyperglycemia, without long-term current use of insulin (HCC)        Past Medical History:   Diagnosis Date   • Anxiety 2022   • Panic attacks 2022   • Primary hypertension 2022        Past Surgical History:   Procedure Laterality Date   • BREAST CYST EXCISION Right     approx 2017        Patient History     Row Name 09/15/22 1300             History    Chief Complaint Pain;Ulcer, wound or other skin conditions  -      Type of Pain Lower Extremity / Leg  -      Brief Description of Current Complaint Pt with increased blood sugars over the past several months, developed a blister to the L ankle while using a heating pad. Pt now s/p hospitalization and IV antibiotic administration.  -      Patient's Rating of General Health Good  -              Pain     Pain Location Leg  -      Pain at Present 4  -      Pain at Best 0  -      Pain at Worst 6;7  -MF              Daily Activities    Primary Language English  -      Pt Participated in POC and Goals Yes  -            User Key  (r) = Recorded By, (t) = Taken By, (c) = Cosigned By    Initials Name Provider Type    Maynor Otero, PT Physical Therapist                EVALUATION     LDA Wound     Row Name 09/15/22 1300             Wound 09/15/22 1300 Left medial ankle Diabetic Ulcer    Wound - Properties Group Placement Date: 09/15/22  -MF Placement Time: 1300  - Side: Left  -MF  Orientation: medial  -MF Location: ankle  -MF Primary Wound Type: Diabetic ulc  -MF      Wound Image View All Images View Images  -MF      Dressing Appearance intact;moist drainage  -MF      Base moist;pink;red;slough;yellow  -MF      Periwound intact;dry  -MF      Periwound Temperature warm  -MF      Periwound Skin Turgor soft  -MF      Edges irregular  -MF      Wound Length (cm) 2.5 cm  -MF      Wound Width (cm) 3 cm  -MF      Wound Depth (cm) 1 cm  full depth obscured by slough  -MF      Wound Surface Area (cm^2) 7.5 cm^2  -MF      Wound Volume (cm^3) 7.5 cm^3  -MF      Drainage Characteristics/Odor serosanguineous  -MF      Drainage Amount moderate  -MF      Care, Wound irrigated with;sterile normal saline;debrided  -MF      Dressing Care foam;low-adherent  HFBc with optifoam  -MF      Periwound Care cleansed with pH balanced cleanser  -MF      Retired Wound - Properties Group Placement Date: 09/15/22  - Placement Time: 1300  -MF Side: Left  -MF Orientation: medial  -MF Location: ankle  -MF Primary Wound Type: Diabetic ulc  -MF      Retired Wound - Properties Group Date first assessed: 09/15/22  - Time first assessed: 1300  -MF Side: Left  -MF Location: ankle  -MF Primary Wound Type: Diabetic ulc  -MF            User Key  (r) = Recorded By, (t) = Taken By, (c) = Cosigned By    Initials Name Provider Type    Maynor Otero, PT Physical Therapist                  WOUND DEBRIDEMENT  Total area of Debridement: ~10cm2  Debridement Site 1  Location- Site 1: L ankle  Selective Debridement- Site 1: Wound Surface <20cmsq  Instruments- Site 1: tweezers, scissors  Excised Tissue Description- Site 1: moderate, slough  Bleeding- Site 1: none              Therapy Education     Row Name 09/15/22 1300             Therapy Education    Education Details Pt to keep dressing dry and intact until next visit. PT educated pt on benefit of debridement and dressing management to help improve healing potential.  -MF      Given  Edema management;Bandaging/dressing change  -      Program New  -MF      How Provided Verbal;Demonstration  -MF      Provided to Patient  -      Level of Understanding Verbalized  -            User Key  (r) = Recorded By, (t) = Taken By, (c) = Cosigned By    Initials Name Provider Type    Maynor Otero, PT Physical Therapist                Recommendation and Plan   PT Assessment/Plan     Row Name 09/15/22 1300          PT Assessment    Functional Limitations Performance in self-care ADL;Limitations in functional capacity and performance;Limitations in community activities;Other (comment)  wound management  -     Impairments Integumentary integrity;Pain  -MF     Assessment Comments Pt presents with complex open wound to L ankle.  PT was able to lightly debride wound to help decrease bioburden and improve skin integrity / healing potential.  wound packed with hydrofera blue to help soften remaining slough to allow for easier removal of nonviable tissue and improve granulation. PT will f/u with further debridement and dressing management 2-3x/week  -     Rehab Potential Good  -MF     Patient/caregiver participated in establishment of treatment plan and goals Yes  -MF     Patient would benefit from skilled therapy intervention Yes  -MF            PT Plan    PT Frequency 2x/week;3x/week  -     Predicted Duration of Therapy Intervention (PT) 10-12 weeks  -     Planned CPT's? PT EVAL MOD COMPLELITY: 55343;PT SELF CARE/MGMT/TRAIN 15 MIN: 12843;PT NONSELECT DEBRIDE 15 MIN: 48687;PT SHERLY DEBRIDE OPEN WOUND UP TO 20 CM: 55906;PT NLFU MIST: 38716;PT MULTI LAYER COMP SYS LE  -     Physical Therapy Interventions (Optional Details) wound care;patient/family education  -     PT Plan Comments debridement, compression wrapping, dressing management  -           User Key  (r) = Recorded By, (t) = Taken By, (c) = Cosigned By    Initials Name Provider Type    Maynor Otero, PT Physical Therapist                   Goals   PT OP Goals     Row Name 09/15/22 1300          PT Short Term Goals    STG Date to Achieve 10/30/22  -     STG 1 Decrease L ankle wound size by 25% as evidence of wound healing.  -     STG 2 Increase L ankle wound granulation to >50% to improve healing potential.  -     STG 3 Pt to have no s/s of infection to L ankle wound.  -            Long Term Goals    LTG Date to Achieve 12/14/22  -     LTG 1 Decrease L ankle wound size by 85% as evidence of wound healing.  -     LTG 2 Increase L ankle wound granulation to >90% to improve healing potential.  -     LTG 3 Pt and family able to change dressing with no questions / issues  -            Time Calculation    PT Goal Re-Cert Due Date 12/14/22  -           User Key  (r) = Recorded By, (t) = Taken By, (c) = Cosigned By    Initials Name Provider Type     Maynor Wright, PT Physical Therapist                Time Calculation: Start Time: 1300  Untimed Charges  PT Eval/Re-eval Minutes: 35  Wound Care: 81549 Selective debridement  48482-[id number] debridement: 25  Total Minutes  Untimed Charges Total Minutes: 60   Total Minutes: 60  Therapy Charges for Today     Code Description Service Date Service Provider Modifiers Qty    58778616712 HC PT EVAL MOD COMPLEXITY 3 9/15/2022 Maynor Wright, PT GP 1    92424836583 HC SHERLY DEBRIDE OPEN WOUND UP TO 20CM 9/15/2022 Maynor Wright, PT GP 1                Maynor Wright, PT  9/15/2022

## 2022-09-17 ENCOUNTER — TRANSCRIBE ORDERS (OUTPATIENT)
Dept: LAB | Facility: HOSPITAL | Age: 57
End: 2022-09-17

## 2022-09-17 DIAGNOSIS — L02.416 ABSCESS OF LEFT HIP: ICD-10-CM

## 2022-09-17 DIAGNOSIS — L03.032 ABSCESS OF FIFTH TOENAIL OF LEFT FOOT: ICD-10-CM

## 2022-09-17 DIAGNOSIS — L03.116 CELLULITIS OF LEFT FOOT: Primary | ICD-10-CM

## 2022-09-17 DIAGNOSIS — L97.529 ULCER OF LEFT FOOT, UNSPECIFIED ULCER STAGE: ICD-10-CM

## 2022-09-18 ENCOUNTER — HOSPITAL ENCOUNTER (OUTPATIENT)
Dept: PHYSICAL THERAPY | Facility: HOSPITAL | Age: 57
Setting detail: THERAPIES SERIES
Discharge: HOME | End: 2022-09-18

## 2022-09-18 DIAGNOSIS — L03.116 CELLULITIS OF LEFT LEG: Primary | ICD-10-CM

## 2022-09-18 DIAGNOSIS — S91.002D ANKLE WOUND, LEFT, SUBSEQUENT ENCOUNTER: ICD-10-CM

## 2022-09-18 DIAGNOSIS — S91.102D OPEN WOUND OF LEFT GREAT TOE, SUBSEQUENT ENCOUNTER: ICD-10-CM

## 2022-09-18 PROCEDURE — 97597 DBRDMT OPN WND 1ST 20 CM/<: CPT

## 2022-09-19 ENCOUNTER — LAB (OUTPATIENT)
Dept: LAB | Facility: HOSPITAL | Age: 57
End: 2022-09-19

## 2022-09-19 ENCOUNTER — TRANSCRIBE ORDERS (OUTPATIENT)
Dept: LAB | Facility: HOSPITAL | Age: 57
End: 2022-09-19

## 2022-09-19 DIAGNOSIS — L03.116 CELLULITIS OF LEFT FOOT: Primary | ICD-10-CM

## 2022-09-19 DIAGNOSIS — L02.416 ABSCESS OF LEFT HIP: ICD-10-CM

## 2022-09-19 DIAGNOSIS — L97.529 ULCER OF LEFT FOOT, UNSPECIFIED ULCER STAGE: ICD-10-CM

## 2022-09-19 DIAGNOSIS — L03.032 ABSCESS OF FIFTH TOENAIL OF LEFT FOOT: ICD-10-CM

## 2022-09-19 DIAGNOSIS — L03.116 CELLULITIS OF LEFT FOOT: ICD-10-CM

## 2022-09-19 LAB
ALBUMIN SERPL-MCNC: 3.7 G/DL (ref 3.5–5.2)
ALBUMIN/GLOB SERPL: 1 G/DL
ALP SERPL-CCNC: 90 U/L (ref 39–117)
ALT SERPL W P-5'-P-CCNC: 10 U/L (ref 1–33)
ANION GAP SERPL CALCULATED.3IONS-SCNC: 11 MMOL/L (ref 5–15)
AST SERPL-CCNC: 13 U/L (ref 1–32)
BASOPHILS # BLD AUTO: 0.02 10*3/MM3 (ref 0–0.2)
BASOPHILS NFR BLD AUTO: 0.4 % (ref 0–1.5)
BILIRUB SERPL-MCNC: 0.4 MG/DL (ref 0–1.2)
BUN SERPL-MCNC: 12 MG/DL (ref 6–20)
BUN/CREAT SERPL: 17.4 (ref 7–25)
CALCIUM SPEC-SCNC: 9.3 MG/DL (ref 8.6–10.5)
CHLORIDE SERPL-SCNC: 95 MMOL/L (ref 98–107)
CO2 SERPL-SCNC: 27 MMOL/L (ref 22–29)
CREAT SERPL-MCNC: 0.69 MG/DL (ref 0.57–1)
CRP SERPL-MCNC: 1 MG/DL (ref 0–0.5)
DEPRECATED RDW RBC AUTO: 39.6 FL (ref 37–54)
EGFRCR SERPLBLD CKD-EPI 2021: 100.7 ML/MIN/1.73
EOSINOPHIL # BLD AUTO: 0.1 10*3/MM3 (ref 0–0.4)
EOSINOPHIL NFR BLD AUTO: 1.9 % (ref 0.3–6.2)
ERYTHROCYTE [DISTWIDTH] IN BLOOD BY AUTOMATED COUNT: 12.8 % (ref 12.3–15.4)
ERYTHROCYTE [SEDIMENTATION RATE] IN BLOOD: 59 MM/HR (ref 0–30)
GLOBULIN UR ELPH-MCNC: 3.7 GM/DL
GLUCOSE SERPL-MCNC: 305 MG/DL (ref 65–99)
HCT VFR BLD AUTO: 41.4 % (ref 34–46.6)
HGB BLD-MCNC: 13.3 G/DL (ref 12–15.9)
IMM GRANULOCYTES # BLD AUTO: 0.04 10*3/MM3 (ref 0–0.05)
IMM GRANULOCYTES NFR BLD AUTO: 0.8 % (ref 0–0.5)
LYMPHOCYTES # BLD AUTO: 1.97 10*3/MM3 (ref 0.7–3.1)
LYMPHOCYTES NFR BLD AUTO: 37.5 % (ref 19.6–45.3)
MCH RBC QN AUTO: 27.7 PG (ref 26.6–33)
MCHC RBC AUTO-ENTMCNC: 32.1 G/DL (ref 31.5–35.7)
MCV RBC AUTO: 86.1 FL (ref 79–97)
MONOCYTES # BLD AUTO: 0.39 10*3/MM3 (ref 0.1–0.9)
MONOCYTES NFR BLD AUTO: 7.4 % (ref 5–12)
NEUTROPHILS NFR BLD AUTO: 2.73 10*3/MM3 (ref 1.7–7)
NEUTROPHILS NFR BLD AUTO: 52 % (ref 42.7–76)
NRBC BLD AUTO-RTO: 0 /100 WBC (ref 0–0.2)
PLATELET # BLD AUTO: 282 10*3/MM3 (ref 140–450)
PMV BLD AUTO: 11.6 FL (ref 6–12)
POTASSIUM SERPL-SCNC: 3.5 MMOL/L (ref 3.5–5.2)
PROT SERPL-MCNC: 7.4 G/DL (ref 6–8.5)
RBC # BLD AUTO: 4.81 10*6/MM3 (ref 3.77–5.28)
SODIUM SERPL-SCNC: 133 MMOL/L (ref 136–145)
WBC NRBC COR # BLD: 5.25 10*3/MM3 (ref 3.4–10.8)

## 2022-09-19 PROCEDURE — 85025 COMPLETE CBC W/AUTO DIFF WBC: CPT

## 2022-09-19 PROCEDURE — 86140 C-REACTIVE PROTEIN: CPT

## 2022-09-19 PROCEDURE — 80053 COMPREHEN METABOLIC PANEL: CPT

## 2022-09-19 PROCEDURE — 85652 RBC SED RATE AUTOMATED: CPT

## 2022-09-19 PROCEDURE — 36415 COLL VENOUS BLD VENIPUNCTURE: CPT

## 2022-09-20 ENCOUNTER — TELEPHONE (OUTPATIENT)
Dept: FAMILY MEDICINE CLINIC | Facility: CLINIC | Age: 57
End: 2022-09-20

## 2022-09-20 ENCOUNTER — HOSPITAL ENCOUNTER (OUTPATIENT)
Dept: PHYSICAL THERAPY | Facility: HOSPITAL | Age: 57
Setting detail: THERAPIES SERIES
Discharge: HOME | End: 2022-09-20

## 2022-09-20 DIAGNOSIS — L03.116 CELLULITIS OF LEFT LEG: Primary | ICD-10-CM

## 2022-09-20 DIAGNOSIS — S91.002D ANKLE WOUND, LEFT, SUBSEQUENT ENCOUNTER: ICD-10-CM

## 2022-09-20 DIAGNOSIS — S91.102D OPEN WOUND OF LEFT GREAT TOE, SUBSEQUENT ENCOUNTER: ICD-10-CM

## 2022-09-20 PROCEDURE — 97597 DBRDMT OPN WND 1ST 20 CM/<: CPT

## 2022-09-20 RX ORDER — PEN NEEDLE, DIABETIC 30 GX3/16"
1 NEEDLE, DISPOSABLE MISCELLANEOUS DAILY
Qty: 100 EACH | Refills: 1 | Status: SHIPPED | OUTPATIENT
Start: 2022-09-20

## 2022-09-20 NOTE — THERAPY WOUND CARE TREATMENT
Outpatient Rehabilitation - Wound/Debridement Treatment Note  [facility]     Patient Name: Michelle Weaver  : 1965  MRN: [id number]  Today's Date: 2022                 Admit Date: 2022    Visit Dx:    ICD-10-CM ICD-9-CM   1. Cellulitis of left leg  L03.116 682.6   2. Ankle wound, left, subsequent encounter  S91.002D V58.89     891.0   3. Open wound of left great toe, subsequent encounter  S91.102D V58.89     893.0       Patient Active Problem List   Diagnosis   • Cellulitis of left leg   • Toe ulcer (HCC)   • Anxiety   • Panic attacks   • Primary hypertension   • Hyperglycemia   • Type 2 diabetes mellitus with hyperglycemia, without long-term current use of insulin (HCC)        Past Medical History:   Diagnosis Date   • Anxiety 2022   • Panic attacks 2022   • Primary hypertension 2022        Past Surgical History:   Procedure Laterality Date   • BREAST CYST EXCISION Right     approx          EVALUATION   PT Ortho     Row Name 22 1000       Subjective Comments    Subjective Comments Pt just seen by Dr. Sosa at [facility], reports MD pleased with wound progress.  She remains on daily IV abx, sees MD again on Tuesday.  -JM       Subjective Pain    Able to rate subjective pain? yes  -JM    Pre-Treatment Pain Level 0  -JM    Post-Treatment Pain Level 0  -JM       Transfers    Comment, (Transfers) remained seated in transport chair for tx  -JM          User Key  (r) = Recorded By, (t) = Taken By, (c) = Cosigned By    Initials Name Provider Type    Josiane Roy, PT Physical Therapist                 [facility] Wound     Row Name               Wound 22 Left anterior great toe Diabetic Ulcer    Wound - Properties Group Placement Date: 22  -MB Present on Hospital Admission: Y  -MB Side: Left  -MB Orientation: anterior  -MB Location: great toe  -MB Primary Wound Type: Diabetic ulc  -MB      Retired Wound - Properties Group Placement Date: 22  -MB Present on  Hospital Admission: Y  -MB Side: Left  -MB Orientation: anterior  -MB Location: great toe  -MB Primary Wound Type: Diabetic ulc  -MB      Retired Wound - Properties Group Date first assessed: 09/05/22  -MB Present on Hospital Admission: Y  -MB Side: Left  -MB Location: great toe  -MB Primary Wound Type: Diabetic ulc  -MB              Wound 09/15/22 1300 Left medial ankle Diabetic Ulcer    Wound - Properties Group Placement Date: 09/15/22  -MF Placement Time: 1300  -MF Side: Left  -MF Orientation: medial  -MF Location: ankle  -MF Primary Wound Type: Diabetic ulc  -MF      Retired Wound - Properties Group Placement Date: 09/15/22  -MF Placement Time: 1300  -MF Side: Left  -MF Orientation: medial  -MF Location: ankle  -MF Primary Wound Type: Diabetic ulc  -MF      Retired Wound - Properties Group Date first assessed: 09/15/22  - Time first assessed: 1300  -MF Side: Left  -MF Location: ankle  -MF Primary Wound Type: Diabetic ulc  -MF            User Key  (r) = Recorded By, (t) = Taken By, (c) = Cosigned By    Initials Name Provider Type    Maynor Otero, PT Physical Therapist    Keerthi Bhatt RN Registered Nurse                  WOUND DEBRIDEMENT  Total area of Debridement: 4  Debridement Site 1  Location- Site 1: L ankle  Selective Debridement- Site 1: Wound Surface <20cmsq  Instruments- Site 1: tweezers, scapel, #15  Excised Tissue Description- Site 1: moderate, slough, necrotic, adipose  Bleeding- Site 1: scant   Debridement Site 2  Location- Site 2: L great toe  Selective Debridement- Site 2: Wound Surface <20cmsq  Instruments- Site 2: #15, scapel, tweezers  Excised Tissue Description- Site 2: moderate, other (comment) (callous)             Recommendation and Plan      Goals      PT Goal Re-Cert Due Date: 12/14/22            Time Calculation: Start Time: 1000  Untimed Charges  19393-[id number] debridement: 30  Total Minutes  Untimed Charges Total Minutes: 30   Total Minutes: 30              Josiane  BRIA Alvarenga, PT  9/20/2022

## 2022-09-20 NOTE — THERAPY WOUND CARE TREATMENT
Outpatient Rehabilitation - Wound/Debridement Treatment Note  [facility]     Patient Name: Michelle Weaver  : 1965  MRN: [id number]  Today's Date: 2022                 Admit Date: 2022    Visit Dx:    ICD-10-CM ICD-9-CM   1. Cellulitis of left leg  L03.116 682.6   2. Ankle wound, left, subsequent encounter  S91.002D V58.89     891.0   3. Open wound of left great toe, subsequent encounter  S91.102D V58.89     893.0       Patient Active Problem List   Diagnosis   • Cellulitis of left leg   • Toe ulcer (HCC)   • Anxiety   • Panic attacks   • Primary hypertension   • Hyperglycemia   • Type 2 diabetes mellitus with hyperglycemia, without long-term current use of insulin (HCC)        Past Medical History:   Diagnosis Date   • Anxiety 2022   • Panic attacks 2022   • Primary hypertension 2022        Past Surgical History:   Procedure Laterality Date   • BREAST CYST EXCISION Right     approx 2017         EVALUATION   PT Ortho     Row Name 22 1300       Subjective Comments    Subjective Comments Pt with no new issues or complaints  -MF       Subjective Pain    Able to rate subjective pain? yes  -MF    Pre-Treatment Pain Level 0  -MF    Post-Treatment Pain Level 0  -MF       Transfers    Comment, (Transfers) remained seated in transport chair for tx  -MF          User Key  (r) = Recorded By, (t) = Taken By, (c) = Cosigned By    Initials Name Provider Type    Maynor Otero, PT Physical Therapist                 [facility] Wound     Row Name 22 1300             Wound 09/15/22 1300 Left medial ankle Diabetic Ulcer    Wound - Properties Group Placement Date: 09/15/22  -MF Placement Time: 1300  -MF Side: Left  -MF Orientation: medial  -MF Location: ankle  -MF Primary Wound Type: Diabetic ulc  -MF      Dressing Appearance intact;moist drainage  -MF      Base moist;pink;red;slough;yellow;necrotic;subcutaneous  -MF      Periwound intact;dry  -MF      Periwound Temperature warm  -MF       Periwound Skin Turgor soft  -MF      Edges irregular  -MF      Drainage Characteristics/Odor serosanguineous  -MF      Drainage Amount small  -MF      Care, Wound irrigated with;sterile normal saline;debrided  -MF      Dressing Care foam;low-adherent  hydrofera blue  -MF      Periwound Care cleansed with pH balanced cleanser  -MF      Retired Wound - Properties Group Placement Date: 09/15/22  -MF Placement Time: 1300  -MF Side: Left  -MF Orientation: medial  -MF Location: ankle  -MF Primary Wound Type: Diabetic ulc  -MF      Retired Wound - Properties Group Date first assessed: 09/15/22  -MF Time first assessed: 1300  -MF Side: Left  -MF Location: ankle  -MF Primary Wound Type: Diabetic ulc  -MF              Wound 09/05/22 Left anterior great toe Diabetic Ulcer    Wound - Properties Group Placement Date: 09/05/22  -MB Present on Hospital Admission: Y  -MB Side: Left  -MB Orientation: anterior  -MB Location: great toe  -MB Primary Wound Type: Diabetic ulc  -MB      Dressing Appearance intact  -MF      Retired Wound - Properties Group Placement Date: 09/05/22  -MB Present on Hospital Admission: Y  -MB Side: Left  -MB Orientation: anterior  -MB Location: great toe  -MB Primary Wound Type: Diabetic ulc  -MB      Retired Wound - Properties Group Date first assessed: 09/05/22  -MB Present on Hospital Admission: Y  -MB Side: Left  -MB Location: great toe  -MB Primary Wound Type: Diabetic ulc  -MB            User Key  (r) = Recorded By, (t) = Taken By, (c) = Cosigned By    Initials Name Provider Type    Maynor Otero, PT Physical Therapist    Keerthi Bhatt RN Registered Nurse                  WOUND DEBRIDEMENT  Total area of Debridement: ~3cm2  Debridement Site 1  Location- Site 1: L ankle  Selective Debridement- Site 1: Wound Surface <20cmsq  Instruments- Site 1: tweezers  Excised Tissue Description- Site 1: minimum, slough  Bleeding- Site 1: seeping, held pressure, 1 minute                 Recommendation  and Plan   PT Assessment/Plan     Row Name 09/20/22 1300          PT Assessment    Functional Limitations Performance in self-care ADL;Limitations in functional capacity and performance;Limitations in community activities;Other (comment)  wound care  -     Impairments Integumentary integrity;Pain  -     Assessment Comments LLE wound cont to progress well with good granulation of central portion noted.  PT was able to debride a small amount of slough from undermining areas to help decrease bioburden and better encourage wound closure.   PT will cont with debridement and dressing management every 2-3 days.  -     Rehab Potential Good  -     Patient/caregiver participated in establishment of treatment plan and goals Yes  -     Patient would benefit from skilled therapy intervention Yes  -            PT Plan    PT Frequency 2x/week;3x/week  -     Physical Therapy Interventions (Optional Details) wound care;patient/family education  -     PT Plan Comments debridement, dressing management  -           User Key  (r) = Recorded By, (t) = Taken By, (c) = Cosigned By    Initials Name Provider Type     Maynor Wright, PT Physical Therapist                Goals   PT OP Goals     Row Name 09/20/22 1300          Time Calculation    PT Goal Re-Cert Due Date 12/14/22  -           User Key  (r) = Recorded By, (t) = Taken By, (c) = Cosigned By    Initials Name Provider Type     Maynor Wright, PT Physical Therapist                PT Goal Re-Cert Due Date: 12/14/22            Time Calculation: Start Time: 1300  Untimed Charges  77368-Txdbirrjw debridement: 25  Total Minutes  Untimed Charges Total Minutes: 25   Total Minutes: 25  Therapy Charges for Today     Code Description Service Date Service Provider Modifiers Qty    89389790097  SHERLY DEBRIDE OPEN WOUND UP TO 20CM 9/20/2022 Maynor Wright, PT GP 1                  Maynor Wright, PT  9/20/2022

## 2022-09-21 NOTE — TELEPHONE ENCOUNTER
Patient notified, she paid fee over the phone and forms have been faxed, along with discharge summary and recent OV notes

## 2022-09-23 ENCOUNTER — HOSPITAL ENCOUNTER (OUTPATIENT)
Dept: PHYSICAL THERAPY | Facility: HOSPITAL | Age: 57
Setting detail: THERAPIES SERIES
Discharge: HOME | End: 2022-09-23

## 2022-09-23 DIAGNOSIS — S91.102D OPEN WOUND OF LEFT GREAT TOE, SUBSEQUENT ENCOUNTER: ICD-10-CM

## 2022-09-23 DIAGNOSIS — L03.116 CELLULITIS OF LEFT LEG: Primary | ICD-10-CM

## 2022-09-23 DIAGNOSIS — S91.002D ANKLE WOUND, LEFT, SUBSEQUENT ENCOUNTER: ICD-10-CM

## 2022-09-23 PROCEDURE — 97597 DBRDMT OPN WND 1ST 20 CM/<: CPT

## 2022-09-23 NOTE — THERAPY WOUND CARE TREATMENT
Outpatient Rehabilitation - Wound/Debridement Treatment Note  [facility]     Patient Name: Michelle Weaver  : 1965  MRN: [id number]  Today's Date: 2022                 Admit Date: 2022    Visit Dx:    ICD-10-CM ICD-9-CM   1. Cellulitis of left leg  L03.116 682.6   2. Ankle wound, left, subsequent encounter  S91.002D V58.89     891.0   3. Open wound of left great toe, subsequent encounter  S91.102D V58.89     893.0       Patient Active Problem List   Diagnosis   • Cellulitis of left leg   • Toe ulcer (HCC)   • Anxiety   • Panic attacks   • Primary hypertension   • Hyperglycemia   • Type 2 diabetes mellitus with hyperglycemia, without long-term current use of insulin (HCC)        Past Medical History:   Diagnosis Date   • Anxiety 2022   • Panic attacks 2022   • Primary hypertension 2022        Past Surgical History:   Procedure Laterality Date   • BREAST CYST EXCISION Right     approx 2017         EVALUATION   PT Ortho     Row Name 22 1100       Subjective Comments    Subjective Comments No new complaints, areports she saw her doctor yesterday who is pleased with the wound appearance.  -       Subjective Pain    Able to rate subjective pain? yes  -    Pre-Treatment Pain Level 0  -    Post-Treatment Pain Level 0  -       Transfers    Comment, (Transfers) remained seated in transport chair for tx  -          User Key  (r) = Recorded By, (t) = Taken By, (c) = Cosigned By    Initials Name Provider Type     Conrado Ryan, PT Physical Therapist                 [facility] Wound     Row Name 22 1100             Wound 09/15/22 1300 Left medial ankle Diabetic Ulcer    Wound - Properties Group Placement Date: 09/15/22  -MF Placement Time: 1300  -MF Side: Left  - Orientation: medial  - Location: ankle  - Primary Wound Type: Diabetic ulc  -      Dressing Appearance intact;moist drainage  -      Base moist;pink;red;slough;yellow;necrotic;subcutaneous  -       "Periwound intact;dry  -LH      Periwound Temperature warm  -      Periwound Skin Turgor soft  -      Edges irregular  -LH      Wound Length (cm) 2.3 cm  -      Wound Width (cm) 2.7 cm  -      Wound Depth (cm) --  obscured  -      Wound Surface Area (cm^2) 6.21 cm^2  -      Drainage Characteristics/Odor serosanguineous  -      Drainage Amount small  -LH      Care, Wound irrigated with;sterile normal saline;debrided  -LH      Dressing Care foam;low-adherent  HFBc, 6\" optifoam  -      Periwound Care cleansed with pH balanced cleanser;dry periwound area maintained  -      Retired Wound - Properties Group Placement Date: 09/15/22  - Placement Time: 1300  -MF Side: Left  -MF Orientation: medial  -MF Location: ankle  -MF Primary Wound Type: Diabetic ulc  -MF      Retired Wound - Properties Group Date first assessed: 09/15/22  - Time first assessed: 1300  -MF Side: Left  - Location: ankle  -MF Primary Wound Type: Diabetic ulc  -MF              Wound 09/05/22 Left anterior great toe Diabetic Ulcer    Wound - Properties Group Placement Date: 09/05/22  -MB Present on Hospital Admission: Y  -MB Side: Left  -MB Orientation: anterior  -MB Location: great toe  -MB Primary Wound Type: Diabetic ulc  -MB      Dressing Appearance intact;dry  -LH      Base dry  -LH      Periwound intact;dry  -LH      Care, Wound cleansed with;soap and water;debrided  -LH      Dressing Care dressing changed;foam  saline moist HFBc secured with 1\" coban  -LH      Periwound Care cleansed with pH balanced cleanser;dry periwound area maintained  -LH      Retired Wound - Properties Group Placement Date: 09/05/22  -MB Present on Hospital Admission: Y  -MB Side: Left  -MB Orientation: anterior  -MB Location: great toe  -MB Primary Wound Type: Diabetic ulc  -MB      Retired Wound - Properties Group Date first assessed: 09/05/22  -MB Present on Hospital Admission: Y  -MB Side: Left  -MB Location: great toe  -MB Primary Wound Type: " Diabetic ulc  -MB            User Key  (r) = Recorded By, (t) = Taken By, (c) = Cosigned By    Initials Name Provider Type     Maynor Wright, PT Physical Therapist     Conrado Ryan, PT Physical Therapist    Keerthi Bhatt, RN Registered Nurse                  WOUND DEBRIDEMENT  Total area of Debridement: 6cm2  Debridement Site 1  Location- Site 1: L ankle  Selective Debridement- Site 1: Wound Surface <20cmsq  Instruments- Site 1: tweezers  Excised Tissue Description- Site 1: minimum, slough  Bleeding- Site 1: scant   Debridement Site 2  Location- Site 2: L great toe  Selective Debridement- Site 2: Wound Surface <20cmsq  Instruments- Site 2: tweezers, scissors, scapel  Excised Tissue Description- Site 2: moderate, other (comment) (callus)          Therapy Education     Row Name 09/23/22 1100             Therapy Education    Education Details If unable to get appointments next week change HFBc packing every 2-3 days with optifoam to cover. Cleanse wound with threraworx and lighly scrub with 4x4 gauze pad. Otherwise continue current POC.  -      Given Edema management;Bandaging/dressing change  -      Program Reinforced  -      How Provided Verbal;Demonstration  -      Provided to Patient  -      Level of Understanding Verbalized  -            User Key  (r) = Recorded By, (t) = Taken By, (c) = Cosigned By    Initials Name Provider Type     Conrado Ryan, PT Physical Therapist                Recommendation and Plan   PT Assessment/Plan     Row Name 09/23/22 1100          PT Assessment    Functional Limitations Performance in self-care ADL;Limitations in functional capacity and performance;Limitations in community activities;Other (comment)  wound care  -     Impairments Integumentary integrity;Pain  -     Assessment Comments Pt continuing to demonstrate small improvements in reduction of necrotic tissue and L ankle wound base with improvements in granulation. PT able to debride  large amounts of callus build up from L great toe to limit fissuring and potential further skin breakdown. Pt would continue to benefit from debridement and dressing changes to help promote healing.  -     Rehab Potential Good  -     Patient/caregiver participated in establishment of treatment plan and goals Yes  -     Patient would benefit from skilled therapy intervention Yes  -            PT Plan    PT Frequency 2x/week;3x/week  -     Physical Therapy Interventions (Optional Details) wound care;patient/family education  -     PT Plan Comments debridement, dressing management  -           User Key  (r) = Recorded By, (t) = Taken By, (c) = Cosigned By    Initials Name Provider Type     Conrado Ryan, PT Physical Therapist                Goals   PT OP Goals     Row Name 09/23/22 1112          Time Calculation    PT Goal Re-Cert Due Date 12/14/22  -           User Key  (r) = Recorded By, (t) = Taken By, (c) = Cosigned By    Initials Name Provider Type     Conrado Ryan, PT Physical Therapist                PT Goal Re-Cert Due Date: 12/14/22            Time Calculation: Start Time: 1030  Untimed Charges  64129-Fbazjpxly debridement: 20  Total Minutes  Untimed Charges Total Minutes: 20   Total Minutes: 20  Therapy Charges for Today     Code Description Service Date Service Provider Modifiers Qty    09352217703 HC SHERLY DEBRIDE OPEN WOUND UP TO 20CM 9/23/2022 Conrado Ryan, PT GP 1                  Conrado Ryan PT  9/23/2022

## 2022-09-24 ENCOUNTER — TRANSCRIBE ORDERS (OUTPATIENT)
Dept: LAB | Facility: HOSPITAL | Age: 57
End: 2022-09-24

## 2022-09-24 DIAGNOSIS — L02.416 ABSCESS OF LEFT HIP: ICD-10-CM

## 2022-09-24 DIAGNOSIS — L97.529 ULCER OF LEFT FOOT, UNSPECIFIED ULCER STAGE: ICD-10-CM

## 2022-09-24 DIAGNOSIS — L03.032 ABSCESS OF FIFTH TOENAIL OF LEFT FOOT: ICD-10-CM

## 2022-09-24 DIAGNOSIS — L03.116 CELLULITIS OF LEFT FOOT: Primary | ICD-10-CM

## 2022-09-26 ENCOUNTER — HOSPITAL ENCOUNTER (OUTPATIENT)
Dept: PHYSICAL THERAPY | Facility: HOSPITAL | Age: 57
Setting detail: THERAPIES SERIES
Discharge: HOME | End: 2022-09-26

## 2022-09-26 ENCOUNTER — LAB (OUTPATIENT)
Dept: LAB | Facility: HOSPITAL | Age: 57
End: 2022-09-26

## 2022-09-26 DIAGNOSIS — L03.032 ABSCESS OF FIFTH TOENAIL OF LEFT FOOT: ICD-10-CM

## 2022-09-26 DIAGNOSIS — S91.102D OPEN WOUND OF LEFT GREAT TOE, SUBSEQUENT ENCOUNTER: ICD-10-CM

## 2022-09-26 DIAGNOSIS — L03.116 CELLULITIS OF LEFT FOOT: ICD-10-CM

## 2022-09-26 DIAGNOSIS — S91.002D ANKLE WOUND, LEFT, SUBSEQUENT ENCOUNTER: Primary | ICD-10-CM

## 2022-09-26 DIAGNOSIS — L03.116 CELLULITIS OF LEFT LEG: ICD-10-CM

## 2022-09-26 DIAGNOSIS — L97.529 ULCER OF LEFT FOOT, UNSPECIFIED ULCER STAGE: ICD-10-CM

## 2022-09-26 DIAGNOSIS — L02.416 ABSCESS OF LEFT HIP: ICD-10-CM

## 2022-09-26 LAB
ALBUMIN SERPL-MCNC: 4 G/DL (ref 3.5–5.2)
ALBUMIN/GLOB SERPL: 1.1 G/DL
ALP SERPL-CCNC: 88 U/L (ref 39–117)
ALT SERPL W P-5'-P-CCNC: 10 U/L (ref 1–33)
ANION GAP SERPL CALCULATED.3IONS-SCNC: 11 MMOL/L (ref 5–15)
AST SERPL-CCNC: 15 U/L (ref 1–32)
BASOPHILS # BLD AUTO: 0.04 10*3/MM3 (ref 0–0.2)
BASOPHILS NFR BLD AUTO: 0.5 % (ref 0–1.5)
BILIRUB SERPL-MCNC: 0.5 MG/DL (ref 0–1.2)
BUN SERPL-MCNC: 13 MG/DL (ref 6–20)
BUN/CREAT SERPL: 17.3 (ref 7–25)
CALCIUM SPEC-SCNC: 9.4 MG/DL (ref 8.6–10.5)
CHLORIDE SERPL-SCNC: 96 MMOL/L (ref 98–107)
CO2 SERPL-SCNC: 28 MMOL/L (ref 22–29)
CREAT SERPL-MCNC: 0.75 MG/DL (ref 0.57–1)
CRP SERPL-MCNC: <0.3 MG/DL (ref 0–0.5)
DEPRECATED RDW RBC AUTO: 39.4 FL (ref 37–54)
EGFRCR SERPLBLD CKD-EPI 2021: 92.4 ML/MIN/1.73
EOSINOPHIL # BLD AUTO: 0.17 10*3/MM3 (ref 0–0.4)
EOSINOPHIL NFR BLD AUTO: 2.3 % (ref 0.3–6.2)
ERYTHROCYTE [DISTWIDTH] IN BLOOD BY AUTOMATED COUNT: 13.3 % (ref 12.3–15.4)
ERYTHROCYTE [SEDIMENTATION RATE] IN BLOOD: 31 MM/HR (ref 0–30)
GLOBULIN UR ELPH-MCNC: 3.7 GM/DL
GLUCOSE SERPL-MCNC: 304 MG/DL (ref 65–99)
HCT VFR BLD AUTO: 42.1 % (ref 34–46.6)
HGB BLD-MCNC: 13.9 G/DL (ref 12–15.9)
IMM GRANULOCYTES # BLD AUTO: 0.03 10*3/MM3 (ref 0–0.05)
IMM GRANULOCYTES NFR BLD AUTO: 0.4 % (ref 0–0.5)
LYMPHOCYTES # BLD AUTO: 2.22 10*3/MM3 (ref 0.7–3.1)
LYMPHOCYTES NFR BLD AUTO: 30 % (ref 19.6–45.3)
MCH RBC QN AUTO: 27.6 PG (ref 26.6–33)
MCHC RBC AUTO-ENTMCNC: 33 G/DL (ref 31.5–35.7)
MCV RBC AUTO: 83.7 FL (ref 79–97)
MONOCYTES # BLD AUTO: 0.57 10*3/MM3 (ref 0.1–0.9)
MONOCYTES NFR BLD AUTO: 7.7 % (ref 5–12)
NEUTROPHILS NFR BLD AUTO: 4.36 10*3/MM3 (ref 1.7–7)
NEUTROPHILS NFR BLD AUTO: 59.1 % (ref 42.7–76)
NRBC BLD AUTO-RTO: 0 /100 WBC (ref 0–0.2)
PLATELET # BLD AUTO: 259 10*3/MM3 (ref 140–450)
PMV BLD AUTO: 11.2 FL (ref 6–12)
POTASSIUM SERPL-SCNC: 4.2 MMOL/L (ref 3.5–5.2)
PROT SERPL-MCNC: 7.7 G/DL (ref 6–8.5)
RBC # BLD AUTO: 5.03 10*6/MM3 (ref 3.77–5.28)
SODIUM SERPL-SCNC: 135 MMOL/L (ref 136–145)
WBC NRBC COR # BLD: 7.39 10*3/MM3 (ref 3.4–10.8)

## 2022-09-26 PROCEDURE — 85652 RBC SED RATE AUTOMATED: CPT

## 2022-09-26 PROCEDURE — 85025 COMPLETE CBC W/AUTO DIFF WBC: CPT

## 2022-09-26 PROCEDURE — 36415 COLL VENOUS BLD VENIPUNCTURE: CPT

## 2022-09-26 PROCEDURE — 97597 DBRDMT OPN WND 1ST 20 CM/<: CPT

## 2022-09-26 PROCEDURE — 86140 C-REACTIVE PROTEIN: CPT

## 2022-09-26 PROCEDURE — 80053 COMPREHEN METABOLIC PANEL: CPT

## 2022-09-26 NOTE — THERAPY WOUND CARE TREATMENT
Outpatient Rehabilitation - Wound/Debridement Treatment Note  [facility]     Patient Name: Michelle Weaver  : 1965  MRN: [id number]  Today's Date: 2022             L great toe      L medial ankle      Admit Date: 2022    Visit Dx:    ICD-10-CM ICD-9-CM   1. Ankle wound, left, subsequent encounter  S91.002D V58.89     891.0   2. Cellulitis of left leg  L03.116 682.6   3. Open wound of left great toe, subsequent encounter  S91.102D V58.89     893.0       Patient Active Problem List   Diagnosis   • Cellulitis of left leg   • Toe ulcer (HCC)   • Anxiety   • Panic attacks   • Primary hypertension   • Hyperglycemia   • Type 2 diabetes mellitus with hyperglycemia, without long-term current use of insulin (HCC)        Past Medical History:   Diagnosis Date   • Anxiety 2022   • Panic attacks 2022   • Primary hypertension 2022        Past Surgical History:   Procedure Laterality Date   • BREAST CYST EXCISION Right     approx 2017         EVALUATION   PT Ortho     Row Name 22 1100       Subjective Comments    Subjective Comments No complaints or changes, her [facility] provider is pleased with her progress.  -MC       Subjective Pain    Able to rate subjective pain? yes  -MC    Pre-Treatment Pain Level 0  -MC    Post-Treatment Pain Level 0  -MC       Transfers    Comment, (Transfers) remained seated for tx  -          User Key  (r) = Recorded By, (t) = Taken By, (c) = Cosigned By    Initials Name Provider Type    Beth Johnson PT Physical Therapist                 [facility] Wound     Row Name 22 1300             Wound 09/15/22 1300 Left medial ankle Diabetic Ulcer    Wound - Properties Group Placement Date: 09/15/22  -MF Placement Time: 1300  -MF Side: Left  -MF Orientation: medial  -MF Location: ankle  -MF Primary Wound Type: Diabetic ulc  -      Wound Image View All Images View Images  -      Dressing Appearance intact;moist drainage  -MC      Base  "moist;pink;red;slough;yellow;necrotic;subcutaneous  increasing granulation, remaining slough is fibrous  -      Periwound intact;dry  -      Periwound Temperature warm  -      Periwound Skin Turgor soft  -      Edges irregular  -      Wound Length (cm) 2.3 cm  -      Wound Width (cm) 2.7 cm  -      Wound Depth (cm) 0.6 cm  -      Wound Surface Area (cm^2) 6.21 cm^2  -      Wound Volume (cm^3) 3.726 cm^3  -      Drainage Characteristics/Odor serosanguineous  -      Drainage Amount small  -      Care, Wound cleansed with;wound cleanser;debrided  -      Dressing Care dressing applied;silver impregnated;collagen;antimicrobial agent applied;foam;low-adherent;border dressing  asia, saline-moist HFBc, 6\" optifoam  -      Periwound Care cleansed with pH balanced cleanser;dry periwound area maintained  -      Retired Wound - Properties Group Placement Date: 09/15/22  - Placement Time: 1300  -MF Side: Left  -MF Orientation: medial  -MF Location: ankle  -MF Primary Wound Type: Diabetic ulc  -      Retired Wound - Properties Group Date first assessed: 09/15/22  - Time first assessed: 1300  -MF Side: Left  -MF Location: ankle  -MF Primary Wound Type: Diabetic ulc  -MF              Wound 09/05/22 Left anterior great toe Diabetic Ulcer    Wound - Properties Group Placement Date: 09/05/22  -MB Present on Hospital Admission: Y  -MB Side: Left  -MB Orientation: anterior  -MB Location: great toe  -MB Primary Wound Type: Diabetic ulc  -MB      Wound Image View All Images View Images  -      Dressing Appearance intact;dry;no drainage  -      Base dry  -      Periwound intact;dry  -      Care, Wound cleansed with;wound cleanser;debrided  -      Dressing Care open to air  -      Retired Wound - Properties Group Placement Date: 09/05/22  -MB Present on Hospital Admission: Y  -MB Side: Left  -MB Orientation: anterior  -MB Location: great toe  -MB Primary Wound Type: Diabetic ulc  -MB      " Retired Wound - Properties Group Date first assessed: 09/05/22  -MB Present on Hospital Admission: Y  -MB Side: Left  -MB Location: great toe  -MB Primary Wound Type: Diabetic ulc  -MB            User Key  (r) = Recorded By, (t) = Taken By, (c) = Cosigned By    Initials Name Provider Type    Maynor Otero, PT Physical Therapist    Beth Johnson, PT Physical Therapist    Keerthi Bhatt, RN Registered Nurse                  WOUND DEBRIDEMENT  Total area of Debridement: 6 cm2  Debridement Site 1  Location- Site 1: L ankle  Selective Debridement- Site 1: Wound Surface <20cmsq  Instruments- Site 1: tweezers, #15, scapel  Excised Tissue Description- Site 1: minimum, slough  Bleeding- Site 1: none   Debridement Site 2  Location- Site 2: L great toe  Selective Debridement- Site 2: Wound Surface <20cmsq  Instruments- Site 2: tweezers, #15, scapel  Excised Tissue Description- Site 2: maximum, other (comment) (callus)  Bleeding- Site 2: none          Therapy Education     Row Name 09/26/22 1300             Therapy Education    Education Details continue current POC, no need to cover the great toe  -MC      Given Edema management;Bandaging/dressing change  -MC      Program Reinforced;Progressed  -MC      How Provided Verbal;Demonstration  -MC      Provided to Patient  -MC      Level of Understanding Verbalized  -MC            User Key  (r) = Recorded By, (t) = Taken By, (c) = Cosigned By    Initials Name Provider Type    Beth Johnson, PT Physical Therapist                Recommendation and Plan   PT Assessment/Plan     Row Name 09/26/22 1300          PT Assessment    Functional Limitations Performance in self-care ADL;Limitations in functional capacity and performance;Limitations in community activities;Other (comment)  wound care  -MC     Impairments Integumentary integrity;Pain  -MC     Assessment Comments The great toe appears to be completely closed at this time. The medial ankle wound has  stable dimensions but appears improved, with increased granulation and only fibrous slough remaining. PT added asia Ag collagen to attempt to promote additional proliferation of viable tissue. Pt will continue to benefit from skilled PT wound care to promote healing.  -     Rehab Potential Good  -     Patient/caregiver participated in establishment of treatment plan and goals Yes  -     Patient would benefit from skilled therapy intervention Yes  -            PT Plan    PT Frequency 2x/week  -     Physical Therapy Interventions (Optional Details) wound care;patient/family education  -     PT Plan Comments debridement, dressings  -           User Key  (r) = Recorded By, (t) = Taken By, (c) = Cosigned By    Initials Name Provider Type     Beth Banks, PT Physical Therapist                Goals   PT OP Goals     Row Name 09/26/22 1331          Time Calculation    PT Goal Re-Cert Due Date 12/14/22  -           User Key  (r) = Recorded By, (t) = Taken By, (c) = Cosigned By    Initials Name Provider Type     Beth Banks, PT Physical Therapist                PT Goal Re-Cert Due Date: 12/14/22            Time Calculation: Start Time: 0900  Untimed Charges  14710-Urgyqhrga debridement: 25  Total Minutes  Untimed Charges Total Minutes: 25   Total Minutes: 25  Therapy Charges for Today     Code Description Service Date Service Provider Modifiers Qty    31952892269 HC SHERLY DEBRIDE OPEN WOUND UP TO 20CM 9/26/2022 Beth Banks, PT GP 1                  Beth Banks, PT  9/26/2022

## 2022-09-27 ENCOUNTER — TELEPHONE (OUTPATIENT)
Dept: FAMILY MEDICINE CLINIC | Facility: CLINIC | Age: 57
End: 2022-09-27

## 2022-09-27 NOTE — TELEPHONE ENCOUNTER
Caller: Michelle Weaver    Relationship: Self    Best call back number:[phone]    What form or medical record are you requesting: MEDICAL RECORDS FROM September 2, 2022 UNTIL NOW    Who is requesting this form or medical record from you:  [employer] [employer] [facility]; PHONE NUMBER [phone]    How would you like to receive the form or medical records (pick-up, mail, fax): FAX  If fax, what is the fax number:  If mail, what is the address:   If pick-up, provide patient with address and location details    Timeframe paperwork needed: ASAP    Additional notes: FAX NUMBER IS ON [facility] PAPERWORK THAT WAS DROPPED OFF AT OFFICE

## 2022-09-29 ENCOUNTER — HOSPITAL ENCOUNTER (OUTPATIENT)
Dept: PHYSICAL THERAPY | Facility: HOSPITAL | Age: 57
Setting detail: THERAPIES SERIES
Discharge: HOME | End: 2022-09-29

## 2022-09-29 DIAGNOSIS — S91.002D ANKLE WOUND, LEFT, SUBSEQUENT ENCOUNTER: Primary | ICD-10-CM

## 2022-09-29 DIAGNOSIS — L03.116 CELLULITIS OF LEFT LEG: ICD-10-CM

## 2022-09-29 DIAGNOSIS — S91.102D OPEN WOUND OF LEFT GREAT TOE, SUBSEQUENT ENCOUNTER: ICD-10-CM

## 2022-09-29 PROCEDURE — 97597 DBRDMT OPN WND 1ST 20 CM/<: CPT

## 2022-09-29 NOTE — THERAPY WOUND CARE TREATMENT
Outpatient Rehabilitation - Wound/Debridement Treatment Note  Psychiatric     Patient Name: Michelle Weaver  : 1965  MRN: [id number]  Today's Date: 2022                 Admit Date: 2022    Visit Dx:    ICD-10-CM ICD-9-CM   1. Ankle wound, left, subsequent encounter  S91.002D V58.89     891.0   2. Cellulitis of left leg  L03.116 682.6   3. Open wound of left great toe, subsequent encounter  S91.102D V58.89     893.0       Patient Active Problem List   Diagnosis   • Cellulitis of left leg   • Toe ulcer (HCC)   • Anxiety   • Panic attacks   • Primary hypertension   • Hyperglycemia   • Type 2 diabetes mellitus with hyperglycemia, without long-term current use of insulin (HCC)        Past Medical History:   Diagnosis Date   • Anxiety 2022   • Panic attacks 2022   • Primary hypertension 2022        Past Surgical History:   Procedure Laterality Date   • BREAST CYST EXCISION Right     approx 2017         EVALUATION   PT Ortho     Row Name 22 1345       Subjective Comments    Subjective Comments Pt reports intermittant sharp pains in wound area, asking if this is normal.  Pt continues on daily IV abx.  -JM       Subjective Pain    Able to rate subjective pain? yes  -JM    Pre-Treatment Pain Level 4  -JM    Post-Treatment Pain Level 5  -JM       Transfers    Comment, (Transfers) remained seated in transport chair for tx  -JM          User Key  (r) = Recorded By, (t) = Taken By, (c) = Cosigned By    Initials Name Provider Type    Josiane Roy PT Physical Therapist                 [facility] Wound     Row Name 22 1345             Wound 09/15/22 1300 Left medial ankle Diabetic Ulcer    Wound - Properties Group Placement Date: 09/15/22  -MF Placement Time: 1300  -MF Side: Left  -MF Orientation: medial  -MF Location: ankle  -MF Primary Wound Type: Diabetic ulc  -      Wound Image View All Images View Images  -      Dressing Appearance intact;moist drainage  -      Base  "moist;pink;red;slough;yellow;necrotic;subcutaneous;granulating  increasing granulation, thick biofilm  -      Periwound intact;dry;indurated;other (see comments)  faint erythema, hyperpigmentation  -      Periwound Temperature warm  -      Periwound Skin Turgor soft  -      Edges irregular  -      Wound Length (cm) 2.2 cm  -      Wound Width (cm) 2.3 cm  -      Wound Depth (cm) 0.4 cm  -      Wound Surface Area (cm^2) 5.06 cm^2  -      Wound Volume (cm^3) 2.024 cm^3  -JM      Drainage Characteristics/Odor serosanguineous  -      Drainage Amount small  -      Care, Wound cleansed with;wound cleanser;debrided  -      Dressing Care dressing applied;collagen;antimicrobial agent applied;foam;border dressing  asia, HFBt, 6\" optifoam  -      Periwound Care cleansed with pH balanced cleanser;dry periwound area maintained  -      Retired Wound - Properties Group Placement Date: 09/15/22  - Placement Time: 1300  -MF Side: Left  -MF Orientation: medial  -MF Location: ankle  -MF Primary Wound Type: Diabetic ulc  -MF      Retired Wound - Properties Group Date first assessed: 09/15/22  - Time first assessed: 1300  -MF Side: Left  -MF Location: ankle  -MF Primary Wound Type: Diabetic ulc  -MF              Wound 09/05/22 Left anterior great toe Diabetic Ulcer    Wound - Properties Group Placement Date: 09/05/22  -MB Present on Hospital Admission: Y  -MB Side: Left  -MB Orientation: anterior  -MB Location: great toe  -MB Primary Wound Type: Diabetic ulc  -MB      Base dry  -      Periwound intact;dry  -      Retired Wound - Properties Group Placement Date: 09/05/22  -MB Present on Hospital Admission: Y  -MB Side: Left  -MB Orientation: anterior  -MB Location: great toe  -MB Primary Wound Type: Diabetic ulc  -MB      Retired Wound - Properties Group Date first assessed: 09/05/22  -MB Present on Hospital Admission: Y  -MB Side: Left  -MB Location: great toe  -MB Primary Wound Type: Diabetic ulc  " -MB            User Key  (r) = Recorded By, (t) = Taken By, (c) = Cosigned By    Initials Name Provider Type    Maynor Otero, PT Physical Therapist    Josiane Roy, PT Physical Therapist    Keerthi Bhatt, RN Registered Nurse                  WOUND DEBRIDEMENT  Total area of Debridement: 4cmsq  Debridement Site 1  Location- Site 1: L ankle  Selective Debridement- Site 1: Wound Surface <20cmsq  Instruments- Site 1: tweezers, #15, scapel  Excised Tissue Description- Site 1: moderate, slough, other (comment) (biofilm)  Bleeding- Site 1: seeping, held pressure, 1 minute              Therapy Education     Row Name 09/29/22 0189             Therapy Education    Education Details Reviewed personal factors affecting wound healing (BG, nutrition, leg elevation), rationale for change to HFBt.  -      Given Edema management;Bandaging/dressing change  -      Program Reinforced;Progressed  -      How Provided Verbal;Demonstration  -      Provided to Patient  -      Level of Understanding Verbalized  -            User Key  (r) = Recorded By, (t) = Taken By, (c) = Cosigned By    Initials Name Provider Type    Josiane Roy, PT Physical Therapist                Recommendation and Plan   PT Assessment/Plan     Row Name 09/29/22 3226          PT Assessment    Functional Limitations Performance in self-care ADL;Limitations in functional capacity and performance;Limitations in community activities;Other (comment)  wound care  -     Impairments Integumentary integrity;Pain  -     Assessment Comments Pt's wound with increasing granulation.  PT able to debride thick biofilm this tx with granualtion present underneath.  Changed to HFBt for less dense packing to promote wound closure.  Continue current POC.  -            PT Plan    PT Frequency 2x/week  -     Physical Therapy Interventions (Optional Details) patient/family education;wound care  -     PT Plan Comments debridement, dressings   -JOLENE           User Key  (r) = Recorded By, (t) = Taken By, (c) = Cosigned By    Initials Name Provider Type    Josiane Roy, PT Physical Therapist                Goals   PT OP Goals     Row Name 09/29/22 1345          Time Calculation    PT Goal Re-Cert Due Date 12/14/22  -JOLENE           User Key  (r) = Recorded By, (t) = Taken By, (c) = Cosigned By    Initials Name Provider Type    Josiane Roy, PT Physical Therapist                PT Goal Re-Cert Due Date: 12/14/22            Time Calculation: Start Time: 1345  Untimed Charges  86167-Wkwbejbzq debridement: 25  Total Minutes  Untimed Charges Total Minutes: 25   Total Minutes: 25  Therapy Charges for Today     Code Description Service Date Service Provider Modifiers Qty    46858488489 HC SHERLY DEBRIDE OPEN WOUND UP TO 20CM 9/29/2022 Josiane Alvarenga, PT GP 1                  Josiane Alvarenga, PT  9/29/2022

## 2022-10-03 ENCOUNTER — OFFICE VISIT (OUTPATIENT)
Dept: FAMILY MEDICINE CLINIC | Facility: CLINIC | Age: 57
End: 2022-10-03

## 2022-10-03 ENCOUNTER — HOSPITAL ENCOUNTER (OUTPATIENT)
Dept: PHYSICAL THERAPY | Facility: HOSPITAL | Age: 57
Setting detail: THERAPIES SERIES
Discharge: HOME | End: 2022-10-03

## 2022-10-03 VITALS
WEIGHT: 232 LBS | SYSTOLIC BLOOD PRESSURE: 128 MMHG | HEIGHT: 67 IN | BODY MASS INDEX: 36.41 KG/M2 | TEMPERATURE: 96.9 F | DIASTOLIC BLOOD PRESSURE: 82 MMHG | HEART RATE: 85 BPM

## 2022-10-03 DIAGNOSIS — L03.116 CELLULITIS OF LEFT LEG: ICD-10-CM

## 2022-10-03 DIAGNOSIS — E78.5 HYPERLIPIDEMIA, UNSPECIFIED HYPERLIPIDEMIA TYPE: ICD-10-CM

## 2022-10-03 DIAGNOSIS — I10 PRIMARY HYPERTENSION: Chronic | ICD-10-CM

## 2022-10-03 DIAGNOSIS — S91.002D ANKLE WOUND, LEFT, SUBSEQUENT ENCOUNTER: Primary | ICD-10-CM

## 2022-10-03 DIAGNOSIS — E11.65 TYPE 2 DIABETES MELLITUS WITH HYPERGLYCEMIA, WITHOUT LONG-TERM CURRENT USE OF INSULIN: Primary | ICD-10-CM

## 2022-10-03 DIAGNOSIS — E03.9 HYPOTHYROIDISM, UNSPECIFIED TYPE: ICD-10-CM

## 2022-10-03 PROCEDURE — 99214 OFFICE O/P EST MOD 30 MIN: CPT | Performed by: PHYSICIAN ASSISTANT

## 2022-10-03 PROCEDURE — 97602 WOUND(S) CARE NON-SELECTIVE: CPT

## 2022-10-03 RX ORDER — METFORMIN HYDROCHLORIDE 500 MG/1
TABLET, EXTENDED RELEASE ORAL
Qty: 120 TABLET | Refills: 5 | Status: SHIPPED | OUTPATIENT
Start: 2022-10-03 | End: 2022-12-06

## 2022-10-03 NOTE — PROGRESS NOTES
Chief Complaint   Patient presents with   • Diabetes     2 week f/u        HPI     Michelle Weaver is a 57 y.o. female who is here for 2 week follow-up of type 2 diabetes.     Feeling quit well. Good report from wound care earlier today.   Her glucoses are in the low 200s, coming down. She is up to 19 units of Levemir nightly. Years ago she states metformin did not work. No prior treatment with ACE inhibitor or ARB per her report.   In past was on levothyroxine for hypothyroidism but then started taking some minerals OTC and she states her thyroid function was normal. Has not been measured recently.   Does not take carvedilol until the afternoon. Amlodipine caused hair loss and hctz did not get blood pressure controlled in the past per her report.     Past Medical History:   Diagnosis Date   • Anxiety 9/5/2022   • Panic attacks 9/5/2022   • Primary hypertension 9/5/2022       Past Surgical History:   Procedure Laterality Date   • BREAST CYST EXCISION Right     approx 2017       Family History   Problem Relation Age of Onset   • Cancer Mother    • No Known Problems Daughter    • No Known Problems Daughter    • No Known Problems Daughter    • No Known Problems Daughter        Social History     Socioeconomic History   • Marital status:    Tobacco Use   • Smoking status: Never Smoker   • Smokeless tobacco: Never Used   Vaping Use   • Vaping Use: Never used   Substance and Sexual Activity   • Alcohol use: No   • Drug use: No   • Sexual activity: Defer     Comment: .  Lives with        Allergies   Allergen Reactions   • Sulfa Antibiotics Rash       ROS    Review of Systems   Gastrointestinal: Negative for abdominal pain, diarrhea, nausea and vomiting.   Endocrine: Negative for polydipsia and polyuria.       Vitals:    10/03/22 1232   BP: 128/82   Pulse:    Temp:      Body mass index is 36.41 kg/m².      Current Outpatient Medications:   •  amoxicillin-clavulanate (AUGMENTIN) 875-125 MG per tablet,  Take 1 tablet by mouth twice a day, Disp: 20 tablet, Rfl: 0  •  Blood Glucose Monitoring Suppl (Contour Next One) device, Use twice daily as directed to check blood sugar., Disp: 1 each, Rfl: 0  •  carvedilol (COREG) 12.5 MG tablet, Take 1 tablet by mouth 2 (Two) Times a Day., Disp: 60 tablet, Rfl: 12  •  glucose blood (FREESTYLE LITE) test strip, Use as directed to check blood sugar twice daily, Disp: 60 each, Rfl: 12  •  insulin detemir (LEVEMIR) 100 UNIT/ML injection, Inject 15 Units under the skin into the appropriate area as directed Every Night., Disp: 10 mL, Rfl: 11  •  Insulin Pen Needle (Pen Needles) 31G X 5 MM misc, 1 each Daily. Use to inject insulin once daily as directed, Disp: 100 each, Rfl: 1  •  Microlet Lancets misc, Use as directed to check blood sugar twice daily, Disp: 100 each, Rfl: 12  •  sennosides-docusate (PERICOLACE) 8.6-50 MG per tablet, Take 2 tablets by mouth 2 (Two) Times a Day., Disp: 40 tablet, Rfl: 0  •  metFORMIN ER (GLUCOPHAGE-XR) 500 MG 24 hr tablet, Take 1 tablet by mouth daily for 7 days, then 1 tablet twice daily for 7 days, then 1 tab every morning and 2 tabs every night for 7 days, then 2 tabs twice daily. Take with meals., Disp: 120 tablet, Rfl: 5    PE    Physical Exam  Vitals reviewed.   Constitutional:       General: She is not in acute distress.     Appearance: She is well-developed.   HENT:      Head: Normocephalic and atraumatic.   Eyes:      Conjunctiva/sclera: Conjunctivae normal.   Cardiovascular:      Rate and Rhythm: Normal rate and regular rhythm.      Heart sounds: Normal heart sounds. No murmur heard.  Pulmonary:      Effort: Pulmonary effort is normal.      Breath sounds: Normal breath sounds.   Musculoskeletal:      Cervical back: Normal range of motion.   Skin:     General: Skin is warm and dry.   Neurological:      Mental Status: She is alert.      Gait: Gait normal.   Psychiatric:         Speech: Speech normal.         Behavior: Behavior normal.          Results    Results for orders placed or performed in visit on 09/26/22   Comprehensive Metabolic Panel    Specimen: Blood   Result Value Ref Range    Glucose 304 (H) 65 - 99 mg/dL    BUN 13 6 - 20 mg/dL    Creatinine 0.75 0.57 - 1.00 mg/dL    Sodium 135 (L) 136 - 145 mmol/L    Potassium 4.2 3.5 - 5.2 mmol/L    Chloride 96 (L) 98 - 107 mmol/L    CO2 28.0 22.0 - 29.0 mmol/L    Calcium 9.4 8.6 - 10.5 mg/dL    Total Protein 7.7 6.0 - 8.5 g/dL    Albumin 4.00 3.50 - 5.20 g/dL    ALT (SGPT) 10 1 - 33 U/L    AST (SGOT) 15 1 - 32 U/L    Alkaline Phosphatase 88 39 - 117 U/L    Total Bilirubin 0.5 0.0 - 1.2 mg/dL    Globulin 3.7 gm/dL    A/G Ratio 1.1 g/dL    BUN/Creatinine Ratio 17.3 7.0 - 25.0    Anion Gap 11.0 5.0 - 15.0 mmol/L    eGFR 92.4 >60.0 mL/min/1.73   Sedimentation Rate    Specimen: Blood   Result Value Ref Range    Sed Rate 31 (H) 0 - 30 mm/hr   C-reactive Protein    Specimen: Blood   Result Value Ref Range    C-Reactive Protein <0.30 0.00 - 0.50 mg/dL   CBC Auto Differential    Specimen: Blood   Result Value Ref Range    WBC 7.39 3.40 - 10.80 10*3/mm3    RBC 5.03 3.77 - 5.28 10*6/mm3    Hemoglobin 13.9 12.0 - 15.9 g/dL    Hematocrit 42.1 34.0 - 46.6 %    MCV 83.7 79.0 - 97.0 fL    MCH 27.6 26.6 - 33.0 pg    MCHC 33.0 31.5 - 35.7 g/dL    RDW 13.3 12.3 - 15.4 %    RDW-SD 39.4 37.0 - 54.0 fl    MPV 11.2 6.0 - 12.0 fL    Platelets 259 140 - 450 10*3/mm3    Neutrophil % 59.1 42.7 - 76.0 %    Lymphocyte % 30.0 19.6 - 45.3 %    Monocyte % 7.7 5.0 - 12.0 %    Eosinophil % 2.3 0.3 - 6.2 %    Basophil % 0.5 0.0 - 1.5 %    Immature Grans % 0.4 0.0 - 0.5 %    Neutrophils, Absolute 4.36 1.70 - 7.00 10*3/mm3    Lymphocytes, Absolute 2.22 0.70 - 3.10 10*3/mm3    Monocytes, Absolute 0.57 0.10 - 0.90 10*3/mm3    Eosinophils, Absolute 0.17 0.00 - 0.40 10*3/mm3    Basophils, Absolute 0.04 0.00 - 0.20 10*3/mm3    Immature Grans, Absolute 0.03 0.00 - 0.05 10*3/mm3    nRBC 0.0 0.0 - 0.2 /100 WBC       A/P    Problem List  Items Addressed This Visit        Cardiac and Vasculature    Primary hypertension (Chronic)  -Adequate control on repeat measurement today  -Continue carvedilol       Endocrine and Metabolic    Type 2 diabetes mellitus with hyperglycemia, without long-term current use of insulin (HCC) - Primary  -A1C 13.5 on 9/5/22 during hospital admission   -Reviewed titration of Levemir by 3 units once every 3 days to a fasting blood glucose less than 150.  Remote history of metformin trial. She is agreeable to trying this again and cannot recall any intolerable side effects in the past. Rx was sent to her pharmacy. If ineffective, will recommend Rybelsus.   -Declines start of statin today. LDL was 120 during admission last month but she reports this was not a fasting measurement. Plan to check lipids next visit as she is not fasting today.   -Recommended low-dose lisinopril for renal protective benefit. She wants to think about this but declines today.   -Has eye exam scheduled  -RTC in 2 months for repeat A1C    Relevant Medications    metFORMIN ER (GLUCOPHAGE-XR) 500 MG 24 hr tablet      Other Visit Diagnoses     Hypothyroidism, unspecified type      -Check TFTs    Relevant Orders    TSH Rfx On Abnormal To Free T4    Hyperlipidemia, unspecified hyperlipidemia type              Plan of care was reviewed with patient at the conclusion of today's visit. Education was provided regarding diagnoses, management, and the importance of keeping follow-up appointments. The patient was counseled regarding the risks, benefits, and possible side-effects of treatment. Patient and/or family express understanding and agreement with the management plan.        JOSE GUADALUPE Wyatt

## 2022-10-03 NOTE — THERAPY WOUND CARE TREATMENT
Outpatient Rehabilitation - Wound/Debridement Treatment Note  [facility]     Patient Name: Michelle Weaver  : 1965  MRN: [id number]  Today's Date: 10/3/2022                 Admit Date: 10/3/2022    Visit Dx:    ICD-10-CM ICD-9-CM   1. Ankle wound, left, subsequent encounter  S91.002D V58.89     891.0   2. Cellulitis of left leg  L03.116 682.6     L anterior leg      Patient Active Problem List   Diagnosis   • Cellulitis of left leg   • Toe ulcer (HCC)   • Anxiety   • Panic attacks   • Primary hypertension   • Hyperglycemia   • Type 2 diabetes mellitus with hyperglycemia, without long-term current use of insulin (HCC)        Past Medical History:   Diagnosis Date   • Anxiety 2022   • Panic attacks 2022   • Primary hypertension 2022        Past Surgical History:   Procedure Laterality Date   • BREAST CYST EXCISION Right     approx 2017         EVALUATION   PT Ortho     Row Name 10/03/22 1100       Subjective Comments    Subjective Comments Reports her ID doctor is pleased with the wound.  -       Subjective Pain    Able to rate subjective pain? yes  -    Pre-Treatment Pain Level 0  -    Post-Treatment Pain Level 0  -       Transfers    Comment, (Transfers) remained seated in transport chair for tx  -          User Key  (r) = Recorded By, (t) = Taken By, (c) = Cosigned By    Initials Name Provider Type     Conrado Ryan, PT Physical Therapist                 [facility] Wound     Row Name 10/03/22 1100             Wound 09/15/22 1300 Left medial ankle Diabetic Ulcer    Wound - Properties Group Placement Date: 09/15/22  - Placement Time: 1300  - Side: Left  - Orientation: medial  -MF Location: ankle  -MF Primary Wound Type: Diabetic ulc  -      Wound Image View All Images View Images  -      Dressing Appearance intact;moist drainage  -      Base moist;pink;red;slough;yellow;necrotic;subcutaneous;granulating  increasing granulation, minimal biofilm  -      Periwound  "intact;dry;indurated;other (see comments)  faint erythema, hyperpigmentation, mild maceration  -      Periwound Temperature warm  -      Periwound Skin Turgor soft  -      Edges irregular  -LH      Wound Length (cm) 2.2 cm  -      Wound Width (cm) 2.3 cm  -LH      Wound Depth (cm) 0.3 cm  -LH      Wound Surface Area (cm^2) 5.06 cm^2  -LH      Wound Volume (cm^3) 1.518 cm^3  -LH      Drainage Characteristics/Odor serosanguineous  -      Drainage Amount small  -LH      Care, Wound cleansed with;wound cleanser;debrided  -LH      Dressing Care dressing applied;collagen;antimicrobial agent applied;foam;border dressing  asia, HFBt, 6\" optifoam  -      Periwound Care cleansed with pH balanced cleanser;dry periwound area maintained;barrier ointment applied;other (see comments)  zguard  -      Retired Wound - Properties Group Placement Date: 09/15/22  - Placement Time: 1300  -MF Side: Left  -MF Orientation: medial  -MF Location: ankle  -MF Primary Wound Type: Diabetic ulc  -MF      Retired Wound - Properties Group Date first assessed: 09/15/22  - Time first assessed: 1300  -MF Side: Left  -MF Location: ankle  -MF Primary Wound Type: Diabetic ulc  -MF              Wound 09/05/22 Left anterior great toe Diabetic Ulcer    Wound - Properties Group Placement Date: 09/05/22  -MB Present on Hospital Admission: Y  -MB Side: Left  -MB Orientation: anterior  -MB Location: great toe  -MB Primary Wound Type: Diabetic ulc  -MB      Base dry  -LH      Periwound intact;dry  -LH      Retired Wound - Properties Group Placement Date: 09/05/22  -MB Present on Hospital Admission: Y  -MB Side: Left  -MB Orientation: anterior  -MB Location: great toe  -MB Primary Wound Type: Diabetic ulc  -MB      Retired Wound - Properties Group Date first assessed: 09/05/22  -MB Present on Hospital Admission: Y  -MB Side: Left  -MB Location: great toe  -MB Primary Wound Type: Diabetic ulc  -MB            User Key  (r) = Recorded By, (t) = " Taken By, (c) = Cosigned By    Initials Name Provider Type     Maynor Wright, PT Physical Therapist     Conrado Ryan, PT Physical Therapist    Keerthi Bhatt RN Registered Nurse                  WOUND DEBRIDEMENT  Total area of Debridement: nonselective debridement  Debridement Site 1  Location- Site 1: L ankle  Selective Debridement- Site 1: Wound Surface <20cmsq  Instruments- Site 1: other (comment) (4x4 gauze)  Excised Tissue Description- Site 1: minimum, slough, other (comment) (biofilm)  Bleeding- Site 1: none              Therapy Education     Row Name 10/03/22 1100             Therapy Education    Education Details Continue with current POC with addition of zguard to periwound.  -      Given Edema management;Bandaging/dressing change  -      Program Reinforced;Progressed  -      How Provided Verbal;Demonstration  -      Provided to Patient  -      Level of Understanding Verbalized  -            User Key  (r) = Recorded By, (t) = Taken By, (c) = Cosigned By    Initials Name Provider Type     Conrado Ryan, PT Physical Therapist                Recommendation and Plan   PT Assessment/Plan     Row Name 10/03/22 1100          PT Assessment    Functional Limitations Performance in self-care ADL;Limitations in functional capacity and performance;Limitations in community activities;Other (comment)  wound care  -     Impairments Integumentary integrity;Pain  -     Assessment Comments Pt continuing to demonstrate good improvements in granulation of wound base with minimal necrotic tissue/biofilm present at this time. Pt with mild periwound maceration so PT applied zguard for moisture management. Pt would continue to benefit from skilled wound for further debridement to promote wound healing.  -     Rehab Potential Good  -     Patient/caregiver participated in establishment of treatment plan and goals Yes  -     Patient would benefit from skilled therapy intervention Yes  -             PT Plan    PT Frequency 2x/week  -     Physical Therapy Interventions (Optional Details) wound care;patient/family education  -     PT Plan Comments debridement, dressings  -           User Key  (r) = Recorded By, (t) = Taken By, (c) = Cosigned By    Initials Name Provider Type     Conrado Ryan, PT Physical Therapist                Goals   PT OP Goals     Row Name 10/03/22 1155          Time Calculation    PT Goal Re-Cert Due Date 12/14/22  -           User Key  (r) = Recorded By, (t) = Taken By, (c) = Cosigned By    Initials Name Provider Type     Conrado Ryan, PT Physical Therapist                PT Goal Re-Cert Due Date: 12/14/22            Time Calculation: Start Time: 1030  Untimed Charges  Wound Care: 07895 Non-selective debridement  97602-Non-selective debridement: 20  Total Minutes  Untimed Charges Total Minutes: 20   Total Minutes: 20  Therapy Charges for Today     Code Description Service Date Service Provider Modifiers Qty    09412983341 HC NONSELECTIVE DEBRIDEMENT 10/3/2022 Conrado Ryan, PT GP 1                  Conrado Ryan PT  10/3/2022

## 2022-10-07 ENCOUNTER — HOSPITAL ENCOUNTER (OUTPATIENT)
Dept: PHYSICAL THERAPY | Facility: HOSPITAL | Age: 57
Setting detail: THERAPIES SERIES
Discharge: HOME | End: 2022-10-07

## 2022-10-07 DIAGNOSIS — L03.116 CELLULITIS OF LEFT LEG: ICD-10-CM

## 2022-10-07 DIAGNOSIS — S91.002D ANKLE WOUND, LEFT, SUBSEQUENT ENCOUNTER: Primary | ICD-10-CM

## 2022-10-07 PROCEDURE — 97597 DBRDMT OPN WND 1ST 20 CM/<: CPT

## 2022-10-07 NOTE — THERAPY WOUND CARE TREATMENT
Outpatient Rehabilitation - Wound/Debridement Treatment Note  [facility]     Patient Name: Michelle Weaver  : 1965  MRN: [id number]  Today's Date: 10/7/2022                 Admit Date: 10/7/2022    Visit Dx:    ICD-10-CM ICD-9-CM   1. Ankle wound, left, subsequent encounter  S91.002D V58.89     891.0   2. Cellulitis of left leg  L03.116 682.6     L anterior leg      Patient Active Problem List   Diagnosis   • Cellulitis of left leg   • Toe ulcer (HCC)   • Anxiety   • Panic attacks   • Primary hypertension   • Hyperglycemia   • Type 2 diabetes mellitus with hyperglycemia, without long-term current use of insulin (HCC)        Past Medical History:   Diagnosis Date   • Anxiety 2022   • Panic attacks 2022   • Primary hypertension 2022        Past Surgical History:   Procedure Laterality Date   • BREAST CYST EXCISION Right     approx 2017         EVALUATION   PT Ortho     Row Name 10/07/22 1100       Subjective Comments    Subjective Comments Reports she has noticed decreased drainage on dressing, no new issues/complaints.  -       Subjective Pain    Able to rate subjective pain? yes  -    Pre-Treatment Pain Level 0  -    Post-Treatment Pain Level 0  -       Transfers    Comment, (Transfers) remained seated in transport chair for tx  -          User Key  (r) = Recorded By, (t) = Taken By, (c) = Cosigned By    Initials Name Provider Type     Conrado Ryan, PT Physical Therapist                 LDA Wound     Row Name 10/07/22 1100             Wound 09/15/22 1300 Left medial ankle Diabetic Ulcer    Wound - Properties Group Placement Date: 09/15/22  -MF Placement Time: 1300  -MF Side: Left  - Orientation: medial  -MF Location: ankle  -MF Primary Wound Type: Diabetic ulc  -MF      Wound Image View All Images View Images  -      Dressing Appearance intact;moist drainage  -      Base moist;pink;red;slough;yellow;necrotic;subcutaneous;granulating  increasing granulation, minimal  "biofilm  -      Periwound intact;dry;indurated;other (see comments)  hyperpigmentation, no remaining maceration.  -LH      Periwound Temperature warm  -      Periwound Skin Turgor soft  -LH      Edges irregular  -LH      Wound Length (cm) 2 cm  -LH      Wound Width (cm) 1.9 cm  -      Wound Depth (cm) 0.2 cm  -LH      Wound Surface Area (cm^2) 3.8 cm^2  -LH      Wound Volume (cm^3) 0.76 cm^3  -LH      Drainage Characteristics/Odor serosanguineous  -      Drainage Amount small  -LH      Care, Wound cleansed with;wound cleanser;debrided  -LH      Dressing Care dressing applied;collagen;antimicrobial agent applied;foam;border dressing  asia, HFBt, 6\" optifoam  -      Periwound Care cleansed with pH balanced cleanser;dry periwound area maintained;barrier ointment applied;other (see comments)  zguard  -      Retired Wound - Properties Group Placement Date: 09/15/22  - Placement Time: 1300  -MF Side: Left  -MF Orientation: medial  -MF Location: ankle  -MF Primary Wound Type: Diabetic ulc  -MF      Retired Wound - Properties Group Date first assessed: 09/15/22  - Time first assessed: 1300  -MF Side: Left  -MF Location: ankle  -MF Primary Wound Type: Diabetic ulc  -MF              Wound 09/05/22 Left anterior great toe Diabetic Ulcer    Wound - Properties Group Placement Date: 09/05/22  -MB Present on Hospital Admission: Y  -MB Side: Left  -MB Orientation: anterior  -MB Location: great toe  -MB Primary Wound Type: Diabetic ulc  -MB      Base dry  -LH      Periwound intact;dry  -LH      Retired Wound - Properties Group Placement Date: 09/05/22  -MB Present on Hospital Admission: Y  -MB Side: Left  -MB Orientation: anterior  -MB Location: great toe  -MB Primary Wound Type: Diabetic ulc  -MB      Retired Wound - Properties Group Date first assessed: 09/05/22  -MB Present on Hospital Admission: Y  -MB Side: Left  -MB Location: great toe  -MB Primary Wound Type: Diabetic ulc  -MB            User Key  (r) = " Recorded By, (t) = Taken By, (c) = Cosigned By    Initials Name Provider Type     Maynor Wright, PT Physical Therapist     Conrado Ryan, PT Physical Therapist    Keerthi Bhatt RN Registered Nurse                  WOUND DEBRIDEMENT  Total area of Debridement: 4cm2  Debridement Site 1  Location- Site 1: L ankle  Selective Debridement- Site 1: Wound Surface <20cmsq  Instruments- Site 1: tweezers  Excised Tissue Description- Site 1: minimum, slough, other (comment) (biofilm)  Bleeding- Site 1: scant, held pressure, 1 minute              Therapy Education     Row Name 10/07/22 1100             Therapy Education    Education Details Continue current POC, reinforced education on importance of LE elevation for edema management for optimal wound healing.  -      Given Edema management;Bandaging/dressing change  -      Program Reinforced;Progressed  -      How Provided Verbal;Demonstration  -      Provided to Patient  -      Level of Understanding Verbalized  -            User Key  (r) = Recorded By, (t) = Taken By, (c) = Cosigned By    Initials Name Provider Type     Conrado Ryan, PT Physical Therapist                Recommendation and Plan   PT Assessment/Plan     Row Name 10/07/22 1100          PT Assessment    Functional Limitations Performance in self-care ADL;Limitations in functional capacity and performance;Limitations in community activities;Other (comment)  wound care  -     Impairments Integumentary integrity;Pain  -     Assessment Comments Pt demonstrating good improvements in wound dimensions with no remaining periwound maceration this session. PT able to debride small amount of thickened biofilm from wound surface to reveal a majority red granulation tissue. Pt would continue to benefit from skilled wound care for further debridement and dressing changes.  -     Rehab Potential Good  -     Patient/caregiver participated in establishment of treatment plan and goals Yes   -     Patient would benefit from skilled therapy intervention Yes  -            PT Plan    PT Frequency 2x/week  -     Physical Therapy Interventions (Optional Details) wound care;patient/family education  -     PT Plan Comments debridement, dressings  -           User Key  (r) = Recorded By, (t) = Taken By, (c) = Cosigned By    Initials Name Provider Type     Conrado Ryan, PT Physical Therapist                Goals   PT OP Goals     Row Name 10/07/22 1110          Time Calculation    PT Goal Re-Cert Due Date 12/14/22  -           User Key  (r) = Recorded By, (t) = Taken By, (c) = Cosigned By    Initials Name Provider Type     Conrado Ryan, PT Physical Therapist                PT Goal Re-Cert Due Date: 12/14/22            Time Calculation: Start Time: 1030  Untimed Charges  57513-Lwjmnjbwf debridement: 15  Total Minutes  Untimed Charges Total Minutes: 15   Total Minutes: 15  Therapy Charges for Today     Code Description Service Date Service Provider Modifiers Qty    18306486585 HC SHERLY DEBRIDE OPEN WOUND UP TO 20CM 10/7/2022 Conrado Ryan, PT GP 1                  Conrado Ryan, PT  10/7/2022

## 2022-10-11 ENCOUNTER — HOSPITAL ENCOUNTER (OUTPATIENT)
Dept: PHYSICAL THERAPY | Facility: HOSPITAL | Age: 57
Setting detail: THERAPIES SERIES
Discharge: HOME | End: 2022-10-11

## 2022-10-11 DIAGNOSIS — L03.116 CELLULITIS OF LEFT LEG: ICD-10-CM

## 2022-10-11 DIAGNOSIS — S91.002D ANKLE WOUND, LEFT, SUBSEQUENT ENCOUNTER: Primary | ICD-10-CM

## 2022-10-11 PROCEDURE — 97597 DBRDMT OPN WND 1ST 20 CM/<: CPT

## 2022-10-11 NOTE — THERAPY WOUND CARE TREATMENT
Outpatient Rehabilitation - Wound/Debridement Treatment Note  [facility]     Patient Name: Michelle Weaver  : 1965  MRN: [id number]  Today's Date: 10/11/2022                 Admit Date: 10/11/2022    Visit Dx:    ICD-10-CM ICD-9-CM   1. Ankle wound, left, subsequent encounter  S91.002D V58.89     891.0   2. Cellulitis of left leg  L03.116 682.6       Patient Active Problem List   Diagnosis   • Cellulitis of left leg   • Toe ulcer (HCC)   • Anxiety   • Panic attacks   • Primary hypertension   • Hyperglycemia   • Type 2 diabetes mellitus with hyperglycemia, without long-term current use of insulin (HCC)        Past Medical History:   Diagnosis Date   • Anxiety 2022   • Panic attacks 2022   • Primary hypertension 2022        Past Surgical History:   Procedure Laterality Date   • BREAST CYST EXCISION Right     approx 2017         EVALUATION   PT Ortho     Row Name 10/11/22 1300       Subjective Comments    Subjective Comments Pt reports her abx end tomorrow. reports Dr. Irvin is pleased with the wound.  -       Subjective Pain    Able to rate subjective pain? yes  -    Pre-Treatment Pain Level 0  -    Post-Treatment Pain Level 0  -       Transfers    Comment, (Transfers) remained seated in transport chair for tx  -          User Key  (r) = Recorded By, (t) = Taken By, (c) = Cosigned By    Initials Name Provider Type     Conrado Ryan, PT Physical Therapist                 LDA Wound     Row Name 10/11/22 1300             Wound 09/15/22 1300 Left medial ankle Diabetic Ulcer    Wound - Properties Group Placement Date: 09/15/22  - Placement Time: 1300  - Side: Left  - Orientation: medial  -MF Location: ankle  -MF Primary Wound Type: Diabetic ulc  -    Dressing Appearance intact;moist drainage  -      Base moist;pink;red;slough;yellow;necrotic;subcutaneous;granulating  increasing granulation, minimal biofilm  -      Periwound intact;dry;indurated;other (see comments)   "hyperpigmentation, no remaining maceration.  -LH      Periwound Temperature warm  -      Periwound Skin Turgor soft  -      Edges irregular  -LH      Drainage Characteristics/Odor serosanguineous  -LH      Drainage Amount small  -LH      Care, Wound cleansed with;wound cleanser;debrided  -LH      Dressing Care dressing applied;collagen;antimicrobial agent applied;foam;border dressing  asia, HFBt, 6\" optifoam  -LH      Periwound Care cleansed with pH balanced cleanser;dry periwound area maintained;barrier ointment applied;other (see comments)  zguard  -      Retired Wound - Properties Group Placement Date: 09/15/22  - Placement Time: 1300  -MF Side: Left  - Orientation: medial  -MF Location: ankle  -MF Primary Wound Type: Diabetic ulc  -MF    Retired Wound - Properties Group Date first assessed: 09/15/22  - Time first assessed: 1300  -MF Side: Left  -MF Location: ankle  -MF Primary Wound Type: Diabetic ulc  -MF       Wound 09/05/22 Left anterior great toe Diabetic Ulcer    Wound - Properties Group Placement Date: 09/05/22  -MB Present on Hospital Admission: Y  -MB Side: Left  -MB Orientation: anterior  -MB Location: great toe  -MB Primary Wound Type: Diabetic ulc  -MB    Base dry  -LH      Periwound intact;dry  -LH      Retired Wound - Properties Group Placement Date: 09/05/22  -MB Present on Hospital Admission: Y  -MB Side: Left  -MB Orientation: anterior  -MB Location: great toe  -MB Primary Wound Type: Diabetic ulc  -MB    Retired Wound - Properties Group Date first assessed: 09/05/22  -MB Present on Hospital Admission: Y  -MB Side: Left  -MB Location: great toe  -MB Primary Wound Type: Diabetic ulc  -MB          User Key  (r) = Recorded By, (t) = Taken By, (c) = Cosigned By    Initials Name Provider Type    Maynor Otero, PT Physical Therapist    Conrado Ayala, PT Physical Therapist    Keerthi Bhatt, RN Registered Nurse                  WOUND DEBRIDEMENT  Total area of " Debridement: 2cm2  Debridement Site 1  Location- Site 1: L ankle  Selective Debridement- Site 1: Wound Surface <20cmsq  Instruments- Site 1: tweezers  Excised Tissue Description- Site 1: minimum, slough, other (comment) (periwound crusts)  Bleeding- Site 1: none              Therapy Education     Row Name 10/11/22 1300             Therapy Education    Education Details Continue current POC  -      Given Edema management;Bandaging/dressing change  -      Program Reinforced;Progressed  -      How Provided Verbal;Demonstration  -      Provided to Patient  -      Level of Understanding Verbalized  -            User Key  (r) = Recorded By, (t) = Taken By, (c) = Cosigned By    Initials Name Provider Type     Conrado Ryan, PT Physical Therapist                Recommendation and Plan   PT Assessment/Plan     Row Name 10/11/22 1300          PT Assessment    Functional Limitations Performance in self-care ADL;Limitations in functional capacity and performance;Limitations in community activities;Other (comment)  wound care  -     Impairments Integumentary integrity;Pain  -     Assessment Comments Pt continuing to demonstrate good improvements in wound dimensions with good epithelilialization of wound edges and minimal slough/biofilm at wound base. Pt would continue to benefit from skilled wound care for further debridement and dressing changes.  -     Rehab Potential Good  -     Patient/caregiver participated in establishment of treatment plan and goals Yes  -     Patient would benefit from skilled therapy intervention Yes  -        PT Plan    PT Frequency 2x/week  -     Physical Therapy Interventions (Optional Details) wound care;patient/family education  -     PT Plan Comments debridement, dressings  -           User Key  (r) = Recorded By, (t) = Taken By, (c) = Cosigned By    Initials Name Provider Type     Conrado Ryan, PT Physical Therapist                Goals   PT OP Goals      Row Name 10/11/22 1349          Time Calculation    PT Goal Re-Cert Due Date 12/14/22  -           User Key  (r) = Recorded By, (t) = Taken By, (c) = Cosigned By    Initials Name Provider Type     Conrado Ryan, PT Physical Therapist                PT Goal Re-Cert Due Date: 12/14/22            Time Calculation: Start Time: 1030  Untimed Charges  11696-Xhnypqzti debridement: 15  Total Minutes  Untimed Charges Total Minutes: 15   Total Minutes: 15  Therapy Charges for Today     Code Description Service Date Service Provider Modifiers Qty    55009910044 HC SHERLY DEBRIDE OPEN WOUND UP TO 20CM 10/11/2022 Conrado Ryan, PT GP 1                  Conrado Ryan, PT  10/11/2022

## 2022-10-14 ENCOUNTER — HOSPITAL ENCOUNTER (OUTPATIENT)
Dept: PHYSICAL THERAPY | Facility: HOSPITAL | Age: 57
Setting detail: THERAPIES SERIES
Discharge: HOME | End: 2022-10-14

## 2022-10-14 DIAGNOSIS — L03.116 CELLULITIS OF LEFT LEG: ICD-10-CM

## 2022-10-14 DIAGNOSIS — S91.102D OPEN WOUND OF LEFT GREAT TOE, SUBSEQUENT ENCOUNTER: ICD-10-CM

## 2022-10-14 DIAGNOSIS — S91.002D ANKLE WOUND, LEFT, SUBSEQUENT ENCOUNTER: Primary | ICD-10-CM

## 2022-10-14 PROCEDURE — 97597 DBRDMT OPN WND 1ST 20 CM/<: CPT

## 2022-10-14 NOTE — THERAPY PROGRESS REPORT/RE-CERT
Outpatient Rehabilitation - Wound/Debridement Progress Note  [facility]     Patient Name: Michelle Weaver  : 1965  MRN: [id number]  Today's Date: 10/14/2022                 Admit Date: 10/14/2022    Visit Dx:    ICD-10-CM ICD-9-CM   1. Ankle wound, left, subsequent encounter  S91.002D V58.89     891.0   2. Cellulitis of left leg  L03.116 682.6   3. Open wound of left great toe, subsequent encounter  S91.102D V58.89     893.0   LLE wound:    L great toe pre-debridement:    L great toe post-debridement:    L great toe (min erythema):      Patient Active Problem List   Diagnosis   • Cellulitis of left leg   • Toe ulcer (HCC)   • Anxiety   • Panic attacks   • Primary hypertension   • Hyperglycemia   • Type 2 diabetes mellitus with hyperglycemia, without long-term current use of insulin (HCC)        Past Medical History:   Diagnosis Date   • Anxiety 2022   • Panic attacks 2022   • Primary hypertension 2022        Past Surgical History:   Procedure Laterality Date   • BREAST CYST EXCISION Right     approx 2017         EVALUATION   PT Ortho     Row Name 10/14/22 1345       Subjective Comments    Subjective Comments Pt reports Dr. Gerber was pleased with her progress, she is on her last dose of abx.  -       Subjective Pain    Able to rate subjective pain? yes  -    Pre-Treatment Pain Level 0  -    Post-Treatment Pain Level 0  -       Transfers    Comment, (Transfers) seated in transport chair  -          User Key  (r) = Recorded By, (t) = Taken By, (c) = Cosigned By    Initials Name Provider Type    Josiane Roy, PT Physical Therapist                 [facility] Wound     Row Name 10/14/22 1345             Wound 09/15/22 1300 Left medial ankle Diabetic Ulcer    Wound - Properties Group Placement Date: 09/15/22  -MF Placement Time: 1300  -MF Side: Left  -MF Orientation: medial  -MF Location: ankle  -MF Primary Wound Type: Diabetic ulc  -MF    Wound Image Images linked: 1  -JOLENE       "Dressing Appearance intact;moist drainage;other (see comments)  HFB mildly adherent, moistened to remove  -JM      Base moist;pink;red;slough;yellow;necrotic;subcutaneous;granulating  increasing granulation, minimal biofilm  -      Periwound intact;dry;indurated;other (see comments)  hyperpigmentation, no remaining maceration.  -JM      Periwound Temperature warm  -      Periwound Skin Turgor soft  -      Edges irregular  -JM      Wound Length (cm) 1.5 cm  -JM      Wound Width (cm) 0.8 cm  -JM      Wound Depth (cm) 0.2 cm  -JM      Wound Surface Area (cm^2) 1.2 cm^2  -JM      Wound Volume (cm^3) 0.24 cm^3  -JM      Drainage Characteristics/Odor serosanguineous  -JM      Drainage Amount small  -JM      Care, Wound cleansed with;wound cleanser;debrided  -      Dressing Care dressing applied;collagen;antimicrobial agent applied;foam;border dressing  asia, HFBt, 6\" optifoam, primafix  -      Periwound Care barrier ointment applied;cleansed with pH balanced cleanser;dry periwound area maintained  zguard  -      Retired Wound - Properties Group Placement Date: 09/15/22  - Placement Time: 1300  -MF Side: Left  - Orientation: medial  -MF Location: ankle  -MF Primary Wound Type: Diabetic ulc  -    Retired Wound - Properties Group Date first assessed: 09/15/22  - Time first assessed: 1300  -MF Side: Left  -MF Location: ankle  -MF Primary Wound Type: Diabetic ulc  -MF       Wound 09/05/22 Left anterior great toe Diabetic Ulcer    Wound - Properties Group Placement Date: 09/05/22  -MB Present on Hospital Admission: Y  -MB Side: Left  -MB Orientation: anterior  -MB Location: great toe  -MB Primary Wound Type: Diabetic ulc  -MB    Wound Image Images linked: 3  -JM      Dressing Appearance intact;dry;no drainage  -      Base moist;red;pink;maroon/purple  friable macerated tissues, no exposed bone  -      Periwound intact;dry;redness;warm  min erythema of toe  -      Periwound Temperature warm  -JM      " Periwound Skin Turgor firm  -      Edges callused;irregular  -      Wound Length (cm) 1.5 cm  -      Wound Width (cm) 1 cm  -      Wound Surface Area (cm^2) 1.5 cm^2  -      Drainage Characteristics/Odor serosanguineous  -      Drainage Amount small  -      Care, Wound cleansed with;wound cleanser;debrided  -      Dressing Care dressing applied;collagen;silver impregnated;foam  asia, optifoam ag, primafix tape  -      Periwound Care cleansed with pH balanced cleanser;dry periwound area maintained  -      Retired Wound - Properties Group Placement Date: 09/05/22  -MB Present on Hospital Admission: Y  -MB Side: Left  -MB Orientation: anterior  -MB Location: great toe  -MB Primary Wound Type: Diabetic ulc  -MB    Retired Wound - Properties Group Date first assessed: 09/05/22  -MB Present on Hospital Admission: Y  -MB Side: Left  -MB Location: great toe  -MB Primary Wound Type: Diabetic ul  -MB          User Key  (r) = Recorded By, (t) = Taken By, (c) = Cosigned By    Initials Name Provider Type    Maynor Otero, PT Physical Therapist    Josiane Roy, PT Physical Therapist    Keerthi Bhatt, RN Registered Nurse                  WOUND DEBRIDEMENT  Total area of Debridement: 4cmsq  Debridement Site 1  Location- Site 1: L ankle  Selective Debridement- Site 1: Wound Surface <20cmsq  Instruments- Site 1: tweezers, #15, scapel  Excised Tissue Description- Site 1: minimum, slough  Bleeding- Site 1: scant   Debridement Site 2  Location- Site 2: L great toe  Selective Debridement- Site 2: Wound Surface <20cmsq  Instruments- Site 2: #15, scapel, tweezers  Excised Tissue Description- Site 2: maximum, other (comment) (callous and maroon macerated nonviable tissues)  Bleeding- Site 2: seeping, held pressure, 1 minute          Therapy Education     Row Name 10/14/22 4483             Therapy Education    Education Details Issued offloading shoe, pt to wear when weight bearing L foot, try to  be NWB as much as possible.  Continue to keep dressings dry when bathing.  Issued extra ag foam and primafix to change toe dressing PRN until next tx.  Monitor L great toe for any increasing S&S of infection and notify [facility] office if worsening.  PT will leave  for Dr. Gerber about toe wound.  -      Given Edema management;Bandaging/dressing change  -      Program Reinforced;Progressed  -JOLENE      How Provided Verbal;Demonstration  -      Provided to Patient  -      Level of Understanding Verbalized  -            User Key  (r) = Recorded By, (t) = Taken By, (c) = Cosigned By    Initials Name Provider Type    Josiane Roy, PT Physical Therapist                Recommendation and Plan   PT Assessment/Plan     Row Name 10/14/22 9114          PT Assessment    Functional Limitations Performance in self-care ADL;Limitations in functional capacity and performance;Limitations in community activities;Other (comment)  wound care  -     Impairments Integumentary integrity;Pain  -     Assessment Comments Pt's medial LLE wound much improved with increased granulation and additional reepithelialization.  Pt has met all STGs for LLE wound.  Of note today, pt's L great toe with min erythema, PT debrided callous and noted soft purple area underneath, and open wound after debridement of nonviable tissue.  Base moist and red without any exposed bone, but in setting of erythema, needs to be monitored closely.  PT added asia and ag foam to great toe and issued offloading shoe for any weight-bearing activities, instructed pt to limit weight-bearing L foot.  PT messaged [facility] MD about L great toe wound and erythema.  -     Rehab Potential Good  -     Patient/caregiver participated in establishment of treatment plan and goals Yes  -     Patient would benefit from skilled therapy intervention Yes  -        PT Plan    PT Frequency 2x/week  -JOLENE     Predicted Duration of Therapy Intervention (PT) 12 visits  -JOLENE      Planned CPT's? PT SHERLY DEBRIDE OPEN WOUND UP TO 20 CM: 38278;PT NONSELECT DEBRIDE 15 MIN: 84674;PT SELF CARE/MGMT/TRAIN 15 MIN: 19821  -     Physical Therapy Interventions (Optional Details) patient/family education;wound care  -     PT Plan Comments debridement, dressings  -           User Key  (r) = Recorded By, (t) = Taken By, (c) = Cosigned By    Initials Name Provider Type    Josiane Roy, PT Physical Therapist                Goals   PT OP Goals     Row Name 10/14/22 1345          PT Short Term Goals    STG Date to Achieve 10/30/22  -     STG 1 Decrease L ankle wound size by 25% as evidence of wound healing.  -     STG 1 Progress Met  -     STG 2 Increase L ankle wound granulation to >50% to improve healing potential.  -     STG 2 Progress Met  -     STG 3 Pt to have no s/s of infection to L ankle wound.  -     STG 3 Progress Met  -        Long Term Goals    LTG Date to Achieve 12/14/22  -     LTG 1 Decrease L ankle wound size by 85% as evidence of wound healing.  -     LTG 1 Progress Progressing  -     LTG 2 Increase L ankle wound granulation to >90% to improve healing potential.  -     LTG 2 Progress Met  -     LTG 3 Pt and family able to change dressing with no questions / issues  -     LTG 3 Progress Ongoing  -     LTG 4 L great toe wound to be closed/resurfaced to allow for return to weight-bearing activities.  -     LTG 4 Progress New  -        Time Calculation    PT Goal Re-Cert Due Date 12/14/22  -           User Key  (r) = Recorded By, (t) = Taken By, (c) = Cosigned By    Initials Name Provider Type    Josiane Roy PT Physical Therapist                PT Goal Re-Cert Due Date: 12/14/22  PT Short Term Goals  STG Date to Achieve: 10/30/22  STG 1: Decrease L ankle wound size by 25% as evidence of wound healing.  STG 1 Progress: Met  STG 2: Increase L ankle wound granulation to >50% to improve healing potential.  STG 2 Progress: Met  STG 3: Pt  to have no s/s of infection to L ankle wound.  STG 3 Progress: Met  Long Term Goals  LTG Date to Achieve: 12/14/22  LTG 1: Decrease L ankle wound size by 85% as evidence of wound healing.  LTG 1 Progress: Progressing  LTG 2: Increase L ankle wound granulation to >90% to improve healing potential.  LTG 2 Progress: Met  LTG 3: Pt and family able to change dressing with no questions / issues  LTG 3 Progress: Ongoing  LTG 4: L great toe wound to be closed/resurfaced to allow for return to weight-bearing activities.  LTG 4 Progress: New      Time Calculation: Start Time: 1345  Untimed Charges  87621-Ommdjscnf debridement: 30  Total Minutes  Untimed Charges Total Minutes: 30   Total Minutes: 30  Therapy Charges for Today     Code Description Service Date Service Provider Modifiers Qty    90100141295 HC SHERLY DEBRIDE OPEN WOUND UP TO 20CM 10/14/2022 Josiane Alvarenga, PT GP 1                  Josiane Alvarenga, PT  10/14/2022

## 2022-10-18 ENCOUNTER — HOSPITAL ENCOUNTER (OUTPATIENT)
Dept: PHYSICAL THERAPY | Facility: HOSPITAL | Age: 57
Setting detail: THERAPIES SERIES
Discharge: HOME | End: 2022-10-18

## 2022-10-18 DIAGNOSIS — L03.116 CELLULITIS OF LEFT LEG: ICD-10-CM

## 2022-10-18 DIAGNOSIS — S91.002D ANKLE WOUND, LEFT, SUBSEQUENT ENCOUNTER: Primary | ICD-10-CM

## 2022-10-18 DIAGNOSIS — S91.102D OPEN WOUND OF LEFT GREAT TOE, SUBSEQUENT ENCOUNTER: ICD-10-CM

## 2022-10-18 PROCEDURE — 97597 DBRDMT OPN WND 1ST 20 CM/<: CPT

## 2022-10-18 NOTE — THERAPY WOUND CARE TREATMENT
Outpatient Rehabilitation - Wound/Debridement Treatment Note  [facility]     Patient Name: Michelle Weaver  : 1965  MRN: [id number]  Today's Date: 10/18/2022                 Admit Date: 10/18/2022    Visit Dx:    ICD-10-CM ICD-9-CM   1. Ankle wound, left, subsequent encounter  S91.002D V58.89     891.0   2. Cellulitis of left leg  L03.116 682.6   3. Open wound of left great toe, subsequent encounter  S91.102D V58.89     893.0     L anterior leg      L medial great toe      Patient Active Problem List   Diagnosis   • Cellulitis of left leg   • Toe ulcer (HCC)   • Anxiety   • Panic attacks   • Primary hypertension   • Hyperglycemia   • Type 2 diabetes mellitus with hyperglycemia, without long-term current use of insulin (HCC)        Past Medical History:   Diagnosis Date   • Anxiety 2022   • Panic attacks 2022   • Primary hypertension 2022        Past Surgical History:   Procedure Laterality Date   • BREAST CYST EXCISION Right     approx 2017         EVALUATION   PT Ortho     Row Name 10/18/22 1300       Subjective Comments    Subjective Comments No new issues/complaints, asking if anything can be done about the dry skin on her leg. Reports she is doing well with offloading.  -       Subjective Pain    Able to rate subjective pain? yes  -    Pre-Treatment Pain Level 0  -    Post-Treatment Pain Level 0  -       Transfers    Comment, (Transfers) seated in transport chair  -          User Key  (r) = Recorded By, (t) = Taken By, (c) = Cosigned By    Initials Name Provider Type     Conrado Ryan, PT Physical Therapist                 [facility] Wound     Row Name 10/18/22 1300             Wound 09/15/22 1300 Left medial ankle Diabetic Ulcer    Wound - Properties Group Placement Date: 09/15/22  -MF Placement Time: 1300  -MF Side: Left  - Orientation: medial  -MF Location: ankle  -MF Primary Wound Type: Diabetic ulc  -MF    Wound Image Images linked: 1  -      Dressing Appearance  "intact;moist drainage  -LH      Base moist;pink;red;slough;yellow;necrotic;subcutaneous;granulating  increasing granulation, minimal biofilm  -LH      Periwound intact;dry;indurated;other (see comments)  hyperpigmentation, no remaining maceration.  -LH      Periwound Temperature warm  -LH      Periwound Skin Turgor soft  -LH      Edges irregular  -LH      Drainage Characteristics/Odor serosanguineous  -LH      Drainage Amount small  -LH      Care, Wound cleansed with;wound cleanser;debrided  -LH      Dressing Care dressing applied;collagen;antimicrobial agent applied;foam;border dressing  asia, HFBt, 6\" optifoam, primafix tape  -LH      Periwound Care barrier ointment applied;cleansed with pH balanced cleanser;dry periwound area maintained  zguard  -      Retired Wound - Properties Group Placement Date: 09/15/22  - Placement Time: 1300  -MF Side: Left  - Orientation: medial  -MF Location: ankle  -MF Primary Wound Type: Diabetic ulc  -MF    Retired Wound - Properties Group Date first assessed: 09/15/22  - Time first assessed: 1300  -MF Side: Left  - Location: ankle  -MF Primary Wound Type: Diabetic ulc  -MF       Wound 09/05/22 Left anterior great toe Diabetic Ulcer    Wound - Properties Group Placement Date: 09/05/22  -MB Present on Hospital Admission: Y  -MB Side: Left  -MB Orientation: anterior  -MB Location: great toe  -MB Primary Wound Type: Diabetic ulc  -MB    Wound Image Images linked: 1  -LH      Dressing Appearance intact;moist drainage  -LH      Base moist;red;pink;maroon/purple  mildly macerated epithelialization  -      Periwound intact;dry;redness;warm  min erythema of toe  -LH      Periwound Temperature warm  -LH      Periwound Skin Turgor firm  -LH      Edges callused;irregular  -LH      Drainage Characteristics/Odor serosanguineous  -LH      Drainage Amount small  -LH      Care, Wound cleansed with;wound cleanser;debrided  -LH      Dressing Care dressing applied;collagen;silver " impregnated;foam  Mepilex Ag, Primafix tape  -      Periwound Care cleansed with pH balanced cleanser;dry periwound area maintained  -      Retired Wound - Properties Group Placement Date: 09/05/22  -MB Present on Hospital Admission: Y  -MB Side: Left  -MB Orientation: anterior  -MB Location: great toe  -MB Primary Wound Type: Diabetic ulc  -MB    Retired Wound - Properties Group Date first assessed: 09/05/22  -MB Present on Hospital Admission: Y  -MB Side: Left  -MB Location: great toe  -MB Primary Wound Type: Diabetic ulc  -MB          User Key  (r) = Recorded By, (t) = Taken By, (c) = Cosigned By    Initials Name Provider Type    Maynor Otero, PT Physical Therapist    Conrado Ayala, PT Physical Therapist    Keerthi Bhatt, RN Registered Nurse                  WOUND DEBRIDEMENT  Total area of Debridement: 2cm2  Debridement Site 1  Location- Site 1: L ankle  Selective Debridement- Site 1: Wound Surface <20cmsq  Instruments- Site 1: tweezers  Excised Tissue Description- Site 1: minimum, slough, other (comment) (periwound crusts)  Bleeding- Site 1: scant, held pressure, 1 minute   Debridement Site 2  Location- Site 2: L great toe  Selective Debridement- Site 2: Wound Surface <20cmsq  Instruments- Site 2: #15, scapel, tweezers  Excised Tissue Description- Site 2: minimum, slough, other (comment) (callus)  Bleeding- Site 2: scant, held pressure, 1 minute          Therapy Education     Row Name 10/18/22 [facility]             Therapy Education    Education Details Continue current POC  -      Given Edema management;Bandaging/dressing change  -      Program Reinforced;Progressed  -      How Provided Verbal;Demonstration  -      Provided to Patient  -      Level of Understanding Verbalized  -            User Key  (r) = Recorded By, (t) = Taken By, (c) = Cosigned By    Initials Name Provider Type    Conrado Ayala, PT Physical Therapist                Recommendation and Plan   PT  Assessment/Plan     Row Name 10/18/22 1300          PT Assessment    Functional Limitations Performance in self-care ADL;Limitations in functional capacity and performance;Limitations in community activities;Other (comment)  wound care  -     Impairments Integumentary integrity;Pain  -     Assessment Comments Pt demonstrating further epithelialization of wound edges and reduced wound dimensions of L anterior leg wound. Pt's L anterior leg wound also with good granulation of wound base and minimal remaining necrotic tissue. Pt's L medial great toe demonstrating good epithelialization of a majority of the wound base. Pt would continue to benefit from skilled wound care to promote wound healing.  -     Rehab Potential Good  -     Patient/caregiver participated in establishment of treatment plan and goals Yes  -     Patient would benefit from skilled therapy intervention Yes  -        PT Plan    PT Frequency 2x/week  -     Physical Therapy Interventions (Optional Details) wound care;patient/family education  -     PT Plan Comments debridement, dressings  -           User Key  (r) = Recorded By, (t) = Taken By, (c) = Cosigned By    Initials Name Provider Type     Conrado Ryan, PT Physical Therapist                Goals   PT OP Goals     Row Name 10/18/22 1357          Time Calculation    PT Goal Re-Cert Due Date 12/14/22  -           User Key  (r) = Recorded By, (t) = Taken By, (c) = Cosigned By    Initials Name Provider Type     Conrado Ryan, PT Physical Therapist                PT Goal Re-Cert Due Date: 12/14/22            Time Calculation: Start Time: 1300  Untimed Charges  79135-Dtzkqjwum debridement: 20  Total Minutes  Untimed Charges Total Minutes: 20   Total Minutes: 20  Therapy Charges for Today     Code Description Service Date Service Provider Modifiers Qty    67508590342  SHERLY DEBRIDE OPEN WOUND UP TO 20CM 10/18/2022 Conrado Ryan, PT GP 1                  Conrado Ryan  PT  10/18/2022

## 2022-10-21 ENCOUNTER — HOSPITAL ENCOUNTER (OUTPATIENT)
Dept: PHYSICAL THERAPY | Facility: HOSPITAL | Age: 57
Setting detail: THERAPIES SERIES
Discharge: HOME | End: 2022-10-21

## 2022-10-21 DIAGNOSIS — L03.116 CELLULITIS OF LEFT LEG: ICD-10-CM

## 2022-10-21 DIAGNOSIS — S91.002D ANKLE WOUND, LEFT, SUBSEQUENT ENCOUNTER: Primary | ICD-10-CM

## 2022-10-21 DIAGNOSIS — S91.102D OPEN WOUND OF LEFT GREAT TOE, SUBSEQUENT ENCOUNTER: ICD-10-CM

## 2022-10-21 PROCEDURE — 97597 DBRDMT OPN WND 1ST 20 CM/<: CPT

## 2022-10-21 NOTE — THERAPY WOUND CARE TREATMENT
Outpatient Rehabilitation - Wound/Debridement Treatment Note  [facility]     Patient Name: Michelle Weaver  : 1965  MRN: [id number]  Today's Date: 10/21/2022                 Admit Date: 10/21/2022    Visit Dx:    ICD-10-CM ICD-9-CM   1. Ankle wound, left, subsequent encounter  S91.002D V58.89     891.0   2. Cellulitis of left leg  L03.116 682.6   3. Open wound of left great toe, subsequent encounter  S91.102D V58.89     893.0       Patient Active Problem List   Diagnosis   • Cellulitis of left leg   • Toe ulcer (HCC)   • Anxiety   • Panic attacks   • Primary hypertension   • Hyperglycemia   • Type 2 diabetes mellitus with hyperglycemia, without long-term current use of insulin (HCC)        Past Medical History:   Diagnosis Date   • Anxiety 2022   • Panic attacks 2022   • Primary hypertension 2022        Past Surgical History:   Procedure Laterality Date   • BREAST CYST EXCISION Right     approx 2017         EVALUATION   PT Ortho     Row Name 10/21/22 1100       Subjective Comments    Subjective Comments No complaints or changes  -       Subjective Pain    Able to rate subjective pain? yes  -MC    Pre-Treatment Pain Level 0  -MC    Post-Treatment Pain Level 0  -MC       Transfers    Comment, (Transfers) seated in transport chair for tx  -          User Key  (r) = Recorded By, (t) = Taken By, (c) = Cosigned By    Initials Name Provider Type    Beth Johnson PT Physical Therapist                 [facility] Wound     Row Name 10/21/22 1100             Wound 09/15/22 1300 Left medial ankle Diabetic Ulcer    Wound - Properties Group Placement Date: 09/15/22  - Placement Time: 1300  -MF Side: Left  -MF Orientation: medial  -MF Location: ankle  -MF Primary Wound Type: Diabetic ulc  -MF    Dressing Appearance intact;no drainage  HFB adherent  -MC      Base dry;red;clean  no slough remaining, dry/red base after debridement of crust  -MC      Periwound intact;dry  -MC      Periwound  "Temperature warm  -MC      Periwound Skin Turgor soft  -MC      Edges irregular  -MC      Drainage Amount none  -MC      Care, Wound cleansed with;wound cleanser;debrided;honey applied  -MC      Dressing Care dressing applied;low-adherent;foam;border dressing  honey, 6\" optifoam, PF tape  -MC      Periwound Care cleansed with pH balanced cleanser;dry periwound area maintained  -MC      Retired Wound - Properties Group Placement Date: 09/15/22  - Placement Time: 1300  -MF Side: Left  -MF Orientation: medial  -MF Location: ankle  -MF Primary Wound Type: Diabetic ulc  -MF    Retired Wound - Properties Group Date first assessed: 09/15/22  - Time first assessed: 1300  -MF Side: Left  -MF Location: ankle  -MF Primary Wound Type: Diabetic ulc  -MF       Wound 09/05/22 Left anterior great toe Diabetic Ulcer    Wound - Properties Group Placement Date: 09/05/22  -MB Present on Hospital Admission: Y  -MB Side: Left  -MB Orientation: anterior  -MB Location: great toe  -MB Primary Wound Type: Diabetic ulc  -MB    Dressing Appearance intact;dried drainage  -MC      Base clean;closed/resurfaced;dry;epithelialization  no remaining open area  -MC      Periwound intact;dry;warm  -MC      Periwound Temperature warm  -MC      Periwound Skin Turgor firm  -MC      Edges callused  -MC      Drainage Amount none  -MC      Care, Wound cleansed with;wound cleanser;debrided  -MC      Dressing Care dressing applied;silver impregnated;low-adherent;foam  mepilex Ag, PF tape  -MC      Periwound Care cleansed with pH balanced cleanser;dry periwound area maintained  -MC      Retired Wound - Properties Group Placement Date: 09/05/22  -MB Present on Hospital Admission: Y  -MB Side: Left  -MB Orientation: anterior  -MB Location: great toe  -MB Primary Wound Type: Diabetic ulc  -MB    Retired Wound - Properties Group Date first assessed: 09/05/22  -MB Present on Hospital Admission: Y  -MB Side: Left  -MB Location: great toe  -MB Primary Wound Type: " Diabetic ulc  -MB          User Key  (r) = Recorded By, (t) = Taken By, (c) = Cosigned By    Initials Name Provider Type    Maynor Otero, PT Physical Therapist    Beth Johnson, PT Physical Therapist    Keerthi Bhatt, RN Registered Nurse                  WOUND DEBRIDEMENT  Total area of Debridement: 3 cm2  Debridement Site 1  Location- Site 1: L ankle  Selective Debridement- Site 1: Wound Surface <20cmsq  Instruments- Site 1: tweezers, #15, scapel  Excised Tissue Description- Site 1: moderate, other (comment) (crust)  Bleeding- Site 1: none   Debridement Site 2  Location- Site 2: L great toe  Selective Debridement- Site 2: Wound Surface <20cmsq  Instruments- Site 2: tweezers  Excised Tissue Description- Site 2: minimum, other (comment) (crust)  Bleeding- Site 2: none          Therapy Education     Row Name 10/21/22 1100             Therapy Education    Education Details Explained use of honey to the medial ankle to promote appropriate moisture balance. Will keep toe covered x1 more week to allow new skin to mature.  -MC      Given Edema management;Bandaging/dressing change  -      Program Progressed  -MC      How Provided Verbal;Demonstration  -MC      Provided to Patient  -MC      Level of Understanding Verbalized  -            User Key  (r) = Recorded By, (t) = Taken By, (c) = Cosigned By    Initials Name Provider Type    Beth Johnson, PT Physical Therapist                Recommendation and Plan   PT Assessment/Plan     Row Name 10/21/22 1100          PT Assessment    Functional Limitations Performance in self-care ADL;Limitations in functional capacity and performance;Limitations in community activities;Other (comment)  wound care  -MC     Impairments Integumentary integrity;Pain  -MC     Assessment Comments Pt with notable improvement since last assessment. The great toe area appears closed and the medial ankle area is very small. However, the ankle wound is dry and red today,  so PT switched the dressing to honey and foam to promote appropriate moisture balance. Pt will continue to benefit from skilled PT wound care to promote healing.  -     Rehab Potential Good  -     Patient/caregiver participated in establishment of treatment plan and goals Yes  -     Patient would benefit from skilled therapy intervention Yes  -        PT Plan    PT Frequency 2x/week  -     Physical Therapy Interventions (Optional Details) wound care;patient/family education  -     PT Plan Comments debridement, dressings  -           User Key  (r) = Recorded By, (t) = Taken By, (c) = Cosigned By    Initials Name Provider Type    Beth Johnson, PT Physical Therapist                Goals   PT OP Goals     Row Name 10/21/22 1154          Time Calculation    PT Goal Re-Cert Due Date 12/14/22  -           User Key  (r) = Recorded By, (t) = Taken By, (c) = Cosigned By    Initials Name Provider Type     Beth Banks, PT Physical Therapist                PT Goal Re-Cert Due Date: 12/14/22            Time Calculation: Start Time: 1115  Untimed Charges  38815-Hjiqjxipu debridement: 20  Total Minutes  Untimed Charges Total Minutes: 20   Total Minutes: 20  Therapy Charges for Today     Code Description Service Date Service Provider Modifiers Qty    59075952251 HC SHERLY DEBRIDE OPEN WOUND UP TO 20CM 10/21/2022 Beth Banks, PT GP 1                  Beth Banks, PT  10/21/2022

## 2022-10-25 ENCOUNTER — HOSPITAL ENCOUNTER (OUTPATIENT)
Dept: PHYSICAL THERAPY | Facility: HOSPITAL | Age: 57
Setting detail: THERAPIES SERIES
Discharge: HOME | End: 2022-10-25

## 2022-10-25 ENCOUNTER — TELEPHONE (OUTPATIENT)
Dept: FAMILY MEDICINE CLINIC | Facility: CLINIC | Age: 57
End: 2022-10-25

## 2022-10-25 DIAGNOSIS — L03.116 CELLULITIS OF LEFT LEG: ICD-10-CM

## 2022-10-25 DIAGNOSIS — S91.102D OPEN WOUND OF LEFT GREAT TOE, SUBSEQUENT ENCOUNTER: ICD-10-CM

## 2022-10-25 DIAGNOSIS — S91.002D ANKLE WOUND, LEFT, SUBSEQUENT ENCOUNTER: Primary | ICD-10-CM

## 2022-10-25 PROCEDURE — 97597 DBRDMT OPN WND 1ST 20 CM/<: CPT

## 2022-10-25 NOTE — THERAPY WOUND CARE TREATMENT
Outpatient Rehabilitation - Wound/Debridement Treatment Note  [facility]     Patient Name: Michelle Weaver  : 1965  MRN: [id number]  Today's Date: 10/25/2022                 Admit Date: 10/25/2022    Visit Dx:    ICD-10-CM ICD-9-CM   1. Ankle wound, left, subsequent encounter  S91.002D V58.89     891.0   2. Cellulitis of left leg  L03.116 682.6   3. Open wound of left great toe, subsequent encounter  S91.102D V58.89     893.0       Patient Active Problem List   Diagnosis   • Cellulitis of left leg   • Toe ulcer (HCC)   • Anxiety   • Panic attacks   • Primary hypertension   • Hyperglycemia   • Type 2 diabetes mellitus with hyperglycemia, without long-term current use of insulin (HCC)        Past Medical History:   Diagnosis Date   • Anxiety 2022   • Panic attacks 2022   • Primary hypertension 2022        Past Surgical History:   Procedure Laterality Date   • BREAST CYST EXCISION Right     approx 2017         EVALUATION   PT Ortho     Row Name 10/25/22 1030       Subjective Comments    Subjective Comments no issues or complaints  -MF       Subjective Pain    Able to rate subjective pain? yes  -MF    Pre-Treatment Pain Level 0  -MF    Post-Treatment Pain Level 0  -MF       Transfers    Comment, (Transfers) seated in transport chair for tx  -MF          User Key  (r) = Recorded By, (t) = Taken By, (c) = Cosigned By    Initials Name Provider Type    MF Maynor Wright, PT Physical Therapist                 [facility] Wound     Row Name 10/25/22 1030             Wound 09/15/22 1300 Left medial ankle Diabetic Ulcer    Wound - Properties Group Placement Date: 09/15/22  - Placement Time: 1300  -MF Side: Left  -MF Orientation: medial  -MF Location: ankle  -MF Primary Wound Type: Diabetic ulc  -MF    Dressing Appearance intact  -MF      Base closed/resurfaced  -MF      Periwound intact;dry  -MF      Periwound Temperature warm  -MF      Periwound Skin Turgor soft  -MF      Wound Length (cm) 0 cm  -MF       Wound Width (cm) 0 cm  -MF      Wound Depth (cm) 0 cm  -MF      Wound Surface Area (cm^2) 0 cm^2  -MF      Wound Volume (cm^3) 0 cm^3  -MF      Drainage Amount none  -MF      Care, Wound cleansed with;soap and water;debrided  -MF      Dressing Care foam;low-adherent  therahoney with mepilex Ag foam and optifoam to cover.  -MF      Periwound Care cleansed with pH balanced cleanser  -MF      Retired Wound - Properties Group Placement Date: 09/15/22  - Placement Time: 1300  -MF Side: Left  -MF Orientation: medial  -MF Location: ankle  -MF Primary Wound Type: Diabetic ulc  -MF    Retired Wound - Properties Group Date first assessed: 09/15/22  - Time first assessed: 1300  -MF Side: Left  -MF Location: ankle  -MF Primary Wound Type: Diabetic ulc  -MF       Wound 09/05/22 Left anterior great toe Diabetic Ulcer    Wound - Properties Group Placement Date: 09/05/22  -MB Present on Hospital Admission: Y  -MB Side: Left  -MB Orientation: anterior  -MB Location: great toe  -MB Primary Wound Type: Diabetic ulc  -MB    Dressing Appearance intact  -MF      Base closed/resurfaced  -MF      Periwound intact;dry;warm  -MF      Periwound Temperature warm  -MF      Periwound Skin Turgor firm  -MF      Edges callused  -MF      Wound Length (cm) 0 cm  -MF      Wound Width (cm) 0 cm  -MF      Wound Depth (cm) 0 cm  -MF      Wound Surface Area (cm^2) 0 cm^2  -MF      Wound Volume (cm^3) 0 cm^3  -MF      Drainage Amount none  -MF      Care, Wound cleansed with;soap and water  -MF      Dressing Care foam;low-adherent;silver impregnated  -MF      Retired Wound - Properties Group Placement Date: 09/05/22  -MB Present on Hospital Admission: Y  -MB Side: Left  -MB Orientation: anterior  -MB Location: great toe  -MB Primary Wound Type: Diabetic ulc  -MB    Retired Wound - Properties Group Date first assessed: 09/05/22  -MB Present on Hospital Admission: Y  -MB Side: Left  -MB Location: great toe  -MB Primary Wound Type: Diabetic ulc  -MB           User Key  (r) = Recorded By, (t) = Taken By, (c) = Cosigned By    Initials Name Provider Type    MF Maynor Wright, PT Physical Therapist    Keerthi Bhatt RN Registered Nurse                  WOUND DEBRIDEMENT  Total area of Debridement: ~3cm2  Debridement Site 1  Location- Site 1: L ankle  Selective Debridement- Site 1: Wound Surface <20cmsq  Instruments- Site 1: tweezers, #15, scapel  Excised Tissue Description- Site 1: minimum, other (comment) (crusted nonviable skin)  Bleeding- Site 1: none                 Recommendation and Plan   PT Assessment/Plan     Row Name 10/25/22 1030          PT Assessment    Functional Limitations Performance in self-care ADL;Limitations in functional capacity and performance;Limitations in community activities;Other (comment)  wound care  -     Impairments Integumentary integrity;Pain  -     Assessment Comments No open areas noted today after debridement of nonviable tissue / crust. PT discussed with pt long term management of wounds with offloading, proper foot wear and possible addition of orthotics for pressure management.  Pt also to keep wounds covered for 2 more weeks to help insure better maturation of freshly reepithelialized skin, but pt now able to shower prior to changing dressing. Pt able to verb understanding of all instructions and will f/u with MD in 2 weeks.  Pt also to call in 2-3 weeks if any issues arise.  -     Rehab Potential Good  -     Patient/caregiver participated in establishment of treatment plan and goals Yes  -     Patient would benefit from skilled therapy intervention Yes  -MF        PT Plan    PT Frequency Other (comment)  Pt to f/u in 2-3 weeks if any issues arise.  -     Physical Therapy Interventions (Optional Details) wound care;patient/family education  -     PT Plan Comments cont with debridement and dressing management.  -           User Key  (r) = Recorded By, (t) = Taken By, (c) = Cosigned By    Initials Name  Provider Type    Maynor Otero, PT Physical Therapist                Goals   PT OP Goals     Row Name 10/25/22 1030          Time Calculation    PT Goal Re-Cert Due Date 12/14/22  -           User Key  (r) = Recorded By, (t) = Taken By, (c) = Cosigned By    Initials Name Provider Type    Maynor Otero, PT Physical Therapist                PT Goal Re-Cert Due Date: 12/14/22            Time Calculation: Start Time: 1030  Untimed Charges  84144-Hflmoadae debridement: 25  Total Minutes  Untimed Charges Total Minutes: 25   Total Minutes: 25  Therapy Charges for Today     Code Description Service Date Service Provider Modifiers Qty    24360194421 HC SHERLY DEBRIDE OPEN WOUND UP TO 20CM 10/25/2022 Maynor Wright, PT GP 1                  Maynor Wright, PT  10/25/2022

## 2022-10-25 NOTE — TELEPHONE ENCOUNTER
Caller: Michelle Weaver    Relationship: Self    Best call back number: [phone]    What is the best time to reach you: ANYTIME     Who are you requesting to speak with (clinical staff, provider,  specific staff member): PCP    Do you know the name of the person who called:     What was the call regarding: PATIENT CALLED REQUESTING TO GET HER FMLA UPDATED DUE TO HER INFECTIOUS DISEASE DOCTOR HAS TAKEN HER OFF FOR AN ADDITIONAL 3 TO 4 WEEKS. SHE FURTHER STATES SHE WILL REQUIRE HER PCP TO UPDATE HER FMLA PAPERWORK    Do you require a callback: YES

## 2022-10-31 ENCOUNTER — OFFICE VISIT (OUTPATIENT)
Dept: FAMILY MEDICINE CLINIC | Facility: CLINIC | Age: 57
End: 2022-10-31

## 2022-10-31 ENCOUNTER — PATIENT MESSAGE (OUTPATIENT)
Dept: FAMILY MEDICINE CLINIC | Facility: CLINIC | Age: 57
End: 2022-10-31

## 2022-10-31 VITALS
BODY MASS INDEX: 37.2 KG/M2 | DIASTOLIC BLOOD PRESSURE: 90 MMHG | TEMPERATURE: 96.8 F | SYSTOLIC BLOOD PRESSURE: 150 MMHG | WEIGHT: 237 LBS | HEART RATE: 92 BPM | HEIGHT: 67 IN

## 2022-10-31 DIAGNOSIS — I10 PRIMARY HYPERTENSION: Chronic | ICD-10-CM

## 2022-10-31 DIAGNOSIS — E11.65 TYPE 2 DIABETES MELLITUS WITH HYPERGLYCEMIA, WITHOUT LONG-TERM CURRENT USE OF INSULIN: ICD-10-CM

## 2022-10-31 DIAGNOSIS — Z23 NEED FOR INFLUENZA VACCINATION: ICD-10-CM

## 2022-10-31 DIAGNOSIS — L97.529 ULCER OF TOE OF LEFT FOOT, UNSPECIFIED ULCER STAGE: Primary | ICD-10-CM

## 2022-10-31 PROCEDURE — 90686 IIV4 VACC NO PRSV 0.5 ML IM: CPT | Performed by: PHYSICIAN ASSISTANT

## 2022-10-31 PROCEDURE — 90471 IMMUNIZATION ADMIN: CPT | Performed by: PHYSICIAN ASSISTANT

## 2022-10-31 PROCEDURE — 99214 OFFICE O/P EST MOD 30 MIN: CPT | Performed by: PHYSICIAN ASSISTANT

## 2022-11-03 ENCOUNTER — TELEPHONE (OUTPATIENT)
Dept: FAMILY MEDICINE CLINIC | Facility: CLINIC | Age: 57
End: 2022-11-03

## 2022-11-04 ENCOUNTER — TELEPHONE (OUTPATIENT)
Dept: FAMILY MEDICINE CLINIC | Facility: CLINIC | Age: 57
End: 2022-11-04

## 2022-11-04 NOTE — TELEPHONE ENCOUNTER
Caller: Michelle Weaver    Relationship: Self    Best call back number:  [phone]    What form or medical record are you requesting:     ALREADY HAS FMLA, BUT EMPLOYER IS REQUESTING OFFICE VISIT NOTES BE FAXED OVER    Who is requesting this form or medical record from you:  EMPLOYER    How would you like to receive the form or medical records (pick-up, mail, fax): FAX     Additional notes:      NEEDS OFFICE VISIT NOTES    GET FAX NUMBER FROM [facility] PAPERWORK

## 2022-12-06 ENCOUNTER — OFFICE VISIT (OUTPATIENT)
Dept: FAMILY MEDICINE CLINIC | Facility: CLINIC | Age: 57
End: 2022-12-06

## 2022-12-06 ENCOUNTER — TELEPHONE (OUTPATIENT)
Dept: FAMILY MEDICINE CLINIC | Facility: CLINIC | Age: 57
End: 2022-12-06

## 2022-12-06 VITALS
HEART RATE: 80 BPM | BODY MASS INDEX: 37.67 KG/M2 | WEIGHT: 240 LBS | HEIGHT: 67 IN | SYSTOLIC BLOOD PRESSURE: 160 MMHG | DIASTOLIC BLOOD PRESSURE: 100 MMHG | TEMPERATURE: 97.1 F

## 2022-12-06 DIAGNOSIS — L97.529 DIABETIC ULCER OF LEFT GREAT TOE: ICD-10-CM

## 2022-12-06 DIAGNOSIS — I10 PRIMARY HYPERTENSION: Chronic | ICD-10-CM

## 2022-12-06 DIAGNOSIS — E11.65 TYPE 2 DIABETES MELLITUS WITH HYPERGLYCEMIA, WITHOUT LONG-TERM CURRENT USE OF INSULIN: Primary | ICD-10-CM

## 2022-12-06 DIAGNOSIS — Z00.00 PREVENTATIVE HEALTH CARE: ICD-10-CM

## 2022-12-06 DIAGNOSIS — E78.5 HYPERLIPIDEMIA, UNSPECIFIED HYPERLIPIDEMIA TYPE: ICD-10-CM

## 2022-12-06 DIAGNOSIS — E11.621 DIABETIC ULCER OF LEFT GREAT TOE: ICD-10-CM

## 2022-12-06 LAB
EXPIRATION DATE: NORMAL
HBA1C MFR BLD: 10.8 %
Lab: NORMAL

## 2022-12-06 PROCEDURE — 99214 OFFICE O/P EST MOD 30 MIN: CPT | Performed by: PHYSICIAN ASSISTANT

## 2022-12-06 PROCEDURE — 83036 HEMOGLOBIN GLYCOSYLATED A1C: CPT | Performed by: PHYSICIAN ASSISTANT

## 2022-12-06 RX ORDER — METFORMIN HYDROCHLORIDE 500 MG/1
1000 TABLET, EXTENDED RELEASE ORAL 2 TIMES DAILY WITH MEALS
Qty: 360 TABLET | Refills: 1 | Status: SHIPPED | OUTPATIENT
Start: 2022-12-06 | End: 2023-03-29

## 2022-12-06 RX ORDER — TIRZEPATIDE 5 MG/.5ML
5 INJECTION, SOLUTION SUBCUTANEOUS
Qty: 6 ML | Refills: 0 | Status: SHIPPED | OUTPATIENT
Start: 2022-12-06 | End: 2023-03-29

## 2022-12-06 RX ORDER — TIRZEPATIDE 2.5 MG/.5ML
2.5 INJECTION, SOLUTION SUBCUTANEOUS
Qty: 2 ML | Refills: 0 | Status: SHIPPED | OUTPATIENT
Start: 2022-12-06 | End: 2023-01-03

## 2022-12-06 RX ORDER — ROSUVASTATIN CALCIUM 10 MG/1
10 TABLET, COATED ORAL NIGHTLY
Qty: 90 TABLET | Refills: 0 | Status: SHIPPED | OUTPATIENT
Start: 2022-12-06 | End: 2023-03-29 | Stop reason: SDUPTHER

## 2022-12-06 NOTE — PROGRESS NOTES
Chief Complaint   Patient presents with   • Type 2 diabetes mellitus with hyperglycemia     Check a1c       HPI     Michelle Weaver is a 57 y.o. female who is here for follow-up of type 2 diabetes.     Patient reports she has been out of insulin x 2 weeks because her last pen wasn't working. She spoke with her pharmacy and states they were supposed to contact insurance regarding this but she has not heard back. She is still taking metformin 500 mg in the morning and 1,000 mg qpm. -269 since she has been off of levemir for the past 2 weeks. She had titrated levemir to 26 units and reports glucoses varied from low 100s to 200s on her insulin.  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  She is going to show her release to her work and let me know if she needs a release from this office.  She states wound care also recently released her to home dressing changes. Her left great toe ulcer is much improved.     She didn't take her carvedilol because she fasted today. Her blood pressure at home is always excellent, 110s/60s when she is on her medication.      had MI 2 weeks ago. He is doing much better.     Past Medical History:   Diagnosis Date   • Anxiety 9/5/2022   • Panic attacks 9/5/2022   • Primary hypertension 9/5/2022       Past Surgical History:   Procedure Laterality Date   • BREAST CYST EXCISION Right     approx 2017       Family History   Problem Relation Age of Onset   • Cancer Mother    • No Known Problems Daughter    • No Known Problems Daughter    • No Known Problems Daughter    • No Known Problems Daughter        Social History     Socioeconomic History   • Marital status:    Tobacco Use   • Smoking status: Never   • Smokeless tobacco: Never   Vaping Use   • Vaping Use: Never used   Substance and Sexual Activity   • Alcohol use: No   • Drug use: No   • Sexual activity: Defer     Comment: .  Lives with        Allergies    Allergen Reactions   • Sulfa Antibiotics Rash       ROS    Review of Systems   Respiratory: Negative for shortness of breath.    Cardiovascular: Negative for chest pain.   Endocrine: Negative for polydipsia and polyuria.       Vitals:    12/06/22 1043   BP: 160/100   Pulse: 80   Temp: 97.1 °F (36.2 °C)     Body mass index is 37.67 kg/m².      Current Outpatient Medications:   •  Blood Glucose Monitoring Suppl (Contour Next One) device, Use twice daily as directed to check blood sugar., Disp: 1 each, Rfl: 0  •  carvedilol (COREG) 12.5 MG tablet, Take 1 tablet by mouth 2 (Two) Times a Day., Disp: 60 tablet, Rfl: 12  •  glucose blood (FREESTYLE LITE) test strip, Use as directed to check blood sugar twice daily, Disp: 60 each, Rfl: 12  •  Insulin Pen Needle (Pen Needles) 31G X 5 MM misc, 1 each Daily. Use to inject insulin once daily as directed, Disp: 100 each, Rfl: 1  •  metFORMIN ER (GLUCOPHAGE-XR) 500 MG 24 hr tablet, Take 2 tablets by mouth 2 (Two) Times a Day With Meals., Disp: 360 tablet, Rfl: 1  •  Microlet Lancets misc, Use as directed to check blood sugar twice daily, Disp: 100 each, Rfl: 12  •  insulin detemir (LEVEMIR) 100 UNIT/ML injection, Inject 15 Units under the skin into the appropriate area as directed Every Night., Disp: 10 mL, Rfl: 11  •  rosuvastatin (Crestor) 10 MG tablet, Take 1 tablet by mouth Every Night., Disp: 90 tablet, Rfl: 0  •  Tirzepatide (Mounjaro) 2.5 MG/0.5ML solution pen-injector, Inject 2.5 mg under the skin into the appropriate area as directed Every 7 (Seven) Days for 28 days. Then start 5 mg dose., Disp: 2 mL, Rfl: 0  •  Tirzepatide (Mounjaro) 5 MG/0.5ML solution pen-injector, Inject 0.5 mL under the skin into the appropriate area as directed Every 7 (Seven) Days., Disp: 6 mL, Rfl: 0    PE    Physical Exam  Vitals reviewed.   Constitutional:       General: She is not in acute distress.     Appearance: She is well-developed. She is obese.   HENT:      Head: Normocephalic and  atraumatic.   Eyes:      Conjunctiva/sclera: Conjunctivae normal.   Cardiovascular:      Rate and Rhythm: Normal rate and regular rhythm.      Heart sounds: Normal heart sounds. No murmur heard.  Pulmonary:      Effort: Pulmonary effort is normal.      Breath sounds: Normal breath sounds.   Musculoskeletal:      Cervical back: Normal range of motion.   Skin:     General: Skin is warm and dry.   Neurological:      Mental Status: She is alert.      Gait: Gait normal.   Psychiatric:         Speech: Speech normal.         Behavior: Behavior normal.         Results    Results for orders placed or performed in visit on 12/06/22   POC Glycosylated Hemoglobin (Hb A1C)    Specimen: Blood   Result Value Ref Range    Hemoglobin A1C 10.8 %    Lot Number [id number]     Expiration Date 10/17/24        A/P    Problem List Items Addressed This Visit        Cardiac and Vasculature    Primary hypertension (Chronic)  -BP is elevated today but the patient is off carvedilol again.   -She reports controlled home readings when she is taking this medication.  I encouraged daily medication compliance and that it is okay for her to take her medications when she fasts for blood work.  -Recheck on follow-up.       Endocrine and Metabolic    Type 2 diabetes mellitus with hyperglycemia, without long-term current use of insulin ([facility]) - Primary  -A1c today is 10.8 which is down from 13.5 in September.   -We discussed her diabetes is still uncontrolled. Encouraged low-carb diet.  -She is interested in starting Mounjaro after discussed. I will also have her increase metformin to 1000 mg twice daily and resume levemir but avoid further titration on insulin until her follow-up appointment in 6 weeks.   -I counseled her to have a low threshold for return to wound care if she has any worsening in her toe.  -Discussed low-dose of lisinopril for renal protection which she declines.  -She is agreeable to starting rosuvastatin after discussion.    Relevant  Medications    metFORMIN ER (GLUCOPHAGE-XR) 500 MG 24 hr tablet    Tirzepatide (Mounjaro) 2.5 MG/0.5ML solution pen-injector    Tirzepatide (Mounjaro) 5 MG/0.5ML solution pen-injector    Other Relevant Orders    Basic Metabolic Panel   Other Visit Diagnoses     Diabetic ulcer of left great toe (HCC)      -Infectious disease has released her to return to work on light duty on 12/15/22 and I agree.   -She is to continue dressing changes at home. She was released from wound care. I encouraged her to have a low threshhold for returning to wound care if the area worsens.   -Hopefully we can get her diabetes under better control with the addition of Moujaro today.     Relevant Medications    metFORMIN ER (GLUCOPHAGE-XR) 500 MG 24 hr tablet    Tirzepatide (Mounjaro) 2.5 MG/0.5ML solution pen-injector    Tirzepatide (Mounjaro) 5 MG/0.5ML solution pen-injector    Hyperlipidemia, unspecified hyperlipidemia type      - on 9/6/22. Goal <70.   -Start rosuvastatin 10 mg daily.  -Plan to recheck lipids in 3 months.    Relevant Medications    rosuvastatin (Crestor) 10 MG tablet    Preventative health care        Relevant Orders    TSH Rfx On Abnormal To Free T4          Plan of care was reviewed with patient at the conclusion of today's visit. Education was provided regarding diagnoses, management, and the importance of keeping follow-up appointments. The patient was counseled regarding the risks, benefits, and possible side-effects of treatment. Patient and/or family express understanding and agreement with the management plan.        JOSE GUADALUPE Wyatt

## 2022-12-06 NOTE — TELEPHONE ENCOUNTER
Caller: Michelle Weaver    Relationship: Self    Best call back number: [phone]    What form or medical record are you requesting: DOCTORS NOTES FOR [facility] PAPERWORK EXTENSION     Who is requesting this form or medical record from you: BRYAN PASTRANA    How would you like to receive the form or medical records (pick-up, mail, fax):[facility] PATIENT STATES THAT THE DOCTOR SHOULD HAVE THE FAX NUMBER

## 2022-12-27 ENCOUNTER — DOCUMENTATION (OUTPATIENT)
Dept: PHYSICAL THERAPY | Facility: HOSPITAL | Age: 57
End: 2022-12-27

## 2023-01-10 ENCOUNTER — TELEPHONE (OUTPATIENT)
Dept: FAMILY MEDICINE CLINIC | Facility: CLINIC | Age: 58
End: 2023-01-10
Payer: COMMERCIAL

## 2023-01-10 RX ORDER — LISINOPRIL 10 MG/1
10 TABLET ORAL DAILY
Qty: 30 TABLET | Refills: 0 | Status: SHIPPED | OUTPATIENT
Start: 2023-01-10 | End: 2023-03-29 | Stop reason: SDUPTHER

## 2023-01-10 NOTE — TELEPHONE ENCOUNTER
Pt called stated her blood pressure has been really high for a couple of weeks she says that she normally takes two different blood pressure medications (CARVEDILOL AND CHLORTHALIDONE) but when she called the pharmacy they stated that some lady took her off of the chlorthalidone. She doesn't know who the lady is shes assuming this happened when she was admitted to the hospital she. She previously had 7 refills on this medication but now its been taken off completley; she would like to be put back on it to get her blood pressure back regulated. Please advise at your earliest convenience thank you.

## 2023-01-10 NOTE — TELEPHONE ENCOUNTER
Looks like chlorthalidone may have been discontinued due to dehydration or hyponatremia or both. I will start her on lisinopril 10 mg daily. I notified the patient by phone of this change and advised her to continue taking her carvedilol to monitor her home blood pressures. She states the appointment with me on the 17th was canceled due to an error. She will call our office to reschedule an appointment for 2 weeks from now.

## 2023-02-21 RX ORDER — LISINOPRIL 10 MG/1
10 TABLET ORAL DAILY
Qty: 30 TABLET | Refills: 0 | Status: CANCELLED | OUTPATIENT
Start: 2023-02-21

## 2023-02-21 NOTE — TELEPHONE ENCOUNTER
Rx Refill Note  Requested Prescriptions     Pending Prescriptions Disp Refills   • lisinopril (PRINIVIL,ZESTRIL) 10 MG tablet 30 tablet 0     Sig: Take 1 tablet by mouth Daily.      Last office visit with prescribing clinician: 12/6/2022   Last telemedicine visit with prescribing clinician: Visit date not found   Next office visit with prescribing clinician: Visit date not found                         Would you like a call back once the refill request has been completed: [] Yes [] No    If the office needs to give you a call back, can they leave a voicemail: [] Yes [] No    Angelia Isaac MA  02/21/23, 09:27 EST     [facility] to call office back.     Return in about 6 weeks (around 1/17/2023), or if symptoms worsen or fail to improve, for f/u diabetes, start of Mounjaro.    [facility] CAN RELY MESSAGE AND SCHEDULE

## 2023-02-24 NOTE — TELEPHONE ENCOUNTER
Rx Refill Note  Requested Prescriptions     Pending Prescriptions Disp Refills   • lisinopril (PRINIVIL,ZESTRIL) 10 MG tablet 30 tablet 0     Sig: Take 1 tablet by mouth Daily.      Last office visit with prescribing clinician: 12/6/2022   Last telemedicine visit with prescribing clinician: Visit date not found   Next office visit with prescribing clinician: Visit date not found                         Would you like a call back once the refill request has been completed: [] Yes [] No    If the office needs to give you a call back, can they leave a voicemail: [] Yes [] No    Khadar Urrutia MA  02/24/23, 09:19 EST      Patient was to Return in about 6 weeks (around 1/17/2023), or if symptoms worsen or fail to improve, for f/u diabetes, start of Mounjaro.  Called and left  for patient to return call    OK FOR HUB TO RELAY MESSAGE AND SCHEDULE APPOINTMENT

## 2023-03-29 ENCOUNTER — HOSPITAL ENCOUNTER (OUTPATIENT)
Dept: GENERAL RADIOLOGY | Facility: HOSPITAL | Age: 58
Discharge: HOME | End: 2023-03-29
Payer: COMMERCIAL

## 2023-03-29 ENCOUNTER — OFFICE VISIT (OUTPATIENT)
Dept: FAMILY MEDICINE CLINIC | Facility: CLINIC | Age: 58
End: 2023-03-29
Payer: COMMERCIAL

## 2023-03-29 ENCOUNTER — REFERRAL TRIAGE (OUTPATIENT)
Dept: CASE MANAGEMENT | Facility: OTHER | Age: 58
End: 2023-03-29
Payer: COMMERCIAL

## 2023-03-29 ENCOUNTER — LAB (OUTPATIENT)
Dept: LAB | Facility: HOSPITAL | Age: 58
End: 2023-03-29
Payer: COMMERCIAL

## 2023-03-29 VITALS
TEMPERATURE: 96.9 F | HEIGHT: 67 IN | DIASTOLIC BLOOD PRESSURE: 108 MMHG | WEIGHT: 238 LBS | SYSTOLIC BLOOD PRESSURE: 170 MMHG | HEART RATE: 85 BPM | BODY MASS INDEX: 37.35 KG/M2

## 2023-03-29 DIAGNOSIS — E11.621 DIABETIC ULCER OF TOE OF LEFT FOOT ASSOCIATED WITH TYPE 2 DIABETES MELLITUS, UNSPECIFIED ULCER STAGE: Primary | ICD-10-CM

## 2023-03-29 DIAGNOSIS — Z91.199 NONCOMPLIANCE WITH TREATMENT PLAN: ICD-10-CM

## 2023-03-29 DIAGNOSIS — L03.032 CELLULITIS OF GREAT TOE OF LEFT FOOT: ICD-10-CM

## 2023-03-29 DIAGNOSIS — E11.65 TYPE 2 DIABETES MELLITUS WITH HYPERGLYCEMIA, WITHOUT LONG-TERM CURRENT USE OF INSULIN: ICD-10-CM

## 2023-03-29 DIAGNOSIS — L97.529 DIABETIC ULCER OF TOE OF LEFT FOOT ASSOCIATED WITH TYPE 2 DIABETES MELLITUS, UNSPECIFIED ULCER STAGE: ICD-10-CM

## 2023-03-29 DIAGNOSIS — L97.529 DIABETIC ULCER OF TOE OF LEFT FOOT ASSOCIATED WITH TYPE 2 DIABETES MELLITUS, UNSPECIFIED ULCER STAGE: Primary | ICD-10-CM

## 2023-03-29 DIAGNOSIS — E78.5 HYPERLIPIDEMIA, UNSPECIFIED HYPERLIPIDEMIA TYPE: ICD-10-CM

## 2023-03-29 DIAGNOSIS — Z59.9 FINANCIAL DIFFICULTIES: ICD-10-CM

## 2023-03-29 DIAGNOSIS — E11.621 DIABETIC ULCER OF TOE OF LEFT FOOT ASSOCIATED WITH TYPE 2 DIABETES MELLITUS, UNSPECIFIED ULCER STAGE: ICD-10-CM

## 2023-03-29 DIAGNOSIS — Z00.00 PREVENTATIVE HEALTH CARE: ICD-10-CM

## 2023-03-29 LAB
ALBUMIN SERPL-MCNC: 4.1 G/DL (ref 3.5–5.2)
ALBUMIN/GLOB SERPL: 1.1 G/DL
ALP SERPL-CCNC: 105 U/L (ref 39–117)
ALT SERPL W P-5'-P-CCNC: 12 U/L (ref 1–33)
ANION GAP SERPL CALCULATED.3IONS-SCNC: 14.5 MMOL/L (ref 5–15)
AST SERPL-CCNC: 15 U/L (ref 1–32)
BASOPHILS # BLD AUTO: 0.04 10*3/MM3 (ref 0–0.2)
BASOPHILS NFR BLD AUTO: 0.4 % (ref 0–1.5)
BILIRUB SERPL-MCNC: 1 MG/DL (ref 0–1.2)
BUN SERPL-MCNC: 12 MG/DL (ref 6–20)
BUN/CREAT SERPL: 15 (ref 7–25)
CALCIUM SPEC-SCNC: 10.1 MG/DL (ref 8.6–10.5)
CHLORIDE SERPL-SCNC: 99 MMOL/L (ref 98–107)
CO2 SERPL-SCNC: 23.5 MMOL/L (ref 22–29)
CREAT SERPL-MCNC: 0.8 MG/DL (ref 0.57–1)
CRP SERPL-MCNC: 0.8 MG/DL (ref 0–0.5)
DEPRECATED RDW RBC AUTO: 37.8 FL (ref 37–54)
EGFRCR SERPLBLD CKD-EPI 2021: 85.5 ML/MIN/1.73
EOSINOPHIL # BLD AUTO: 0.04 10*3/MM3 (ref 0–0.4)
EOSINOPHIL NFR BLD AUTO: 0.4 % (ref 0.3–6.2)
ERYTHROCYTE [DISTWIDTH] IN BLOOD BY AUTOMATED COUNT: 13 % (ref 12.3–15.4)
ERYTHROCYTE [SEDIMENTATION RATE] IN BLOOD: 38 MM/HR (ref 0–30)
EXPIRATION DATE: NORMAL
GLOBULIN UR ELPH-MCNC: 3.8 GM/DL
GLUCOSE SERPL-MCNC: 292 MG/DL (ref 65–99)
HBA1C MFR BLD: 10.6 %
HCT VFR BLD AUTO: 42.3 % (ref 34–46.6)
HGB BLD-MCNC: 14.1 G/DL (ref 12–15.9)
IMM GRANULOCYTES # BLD AUTO: 0.04 10*3/MM3 (ref 0–0.05)
IMM GRANULOCYTES NFR BLD AUTO: 0.4 % (ref 0–0.5)
LYMPHOCYTES # BLD AUTO: 2.33 10*3/MM3 (ref 0.7–3.1)
LYMPHOCYTES NFR BLD AUTO: 24.2 % (ref 19.6–45.3)
Lab: NORMAL
MCH RBC QN AUTO: 26.8 PG (ref 26.6–33)
MCHC RBC AUTO-ENTMCNC: 33.3 G/DL (ref 31.5–35.7)
MCV RBC AUTO: 80.4 FL (ref 79–97)
MONOCYTES # BLD AUTO: 0.58 10*3/MM3 (ref 0.1–0.9)
MONOCYTES NFR BLD AUTO: 6 % (ref 5–12)
NEUTROPHILS NFR BLD AUTO: 6.6 10*3/MM3 (ref 1.7–7)
NEUTROPHILS NFR BLD AUTO: 68.6 % (ref 42.7–76)
NRBC BLD AUTO-RTO: 0 /100 WBC (ref 0–0.2)
PLATELET # BLD AUTO: 295 10*3/MM3 (ref 140–450)
PMV BLD AUTO: 12.2 FL (ref 6–12)
POTASSIUM SERPL-SCNC: 4.3 MMOL/L (ref 3.5–5.2)
PROT SERPL-MCNC: 7.9 G/DL (ref 6–8.5)
RBC # BLD AUTO: 5.26 10*6/MM3 (ref 3.77–5.28)
SODIUM SERPL-SCNC: 137 MMOL/L (ref 136–145)
T4 FREE SERPL-MCNC: 1.22 NG/DL (ref 0.93–1.7)
TSH SERPL DL<=0.05 MIU/L-ACNC: 4.53 UIU/ML (ref 0.27–4.2)
WBC NRBC COR # BLD: 9.63 10*3/MM3 (ref 3.4–10.8)

## 2023-03-29 PROCEDURE — 86140 C-REACTIVE PROTEIN: CPT

## 2023-03-29 PROCEDURE — 36415 COLL VENOUS BLD VENIPUNCTURE: CPT

## 2023-03-29 PROCEDURE — 80050 GENERAL HEALTH PANEL: CPT

## 2023-03-29 PROCEDURE — 87205 SMEAR GRAM STAIN: CPT | Performed by: PHYSICIAN ASSISTANT

## 2023-03-29 PROCEDURE — 87040 BLOOD CULTURE FOR BACTERIA: CPT

## 2023-03-29 PROCEDURE — 73630 X-RAY EXAM OF FOOT: CPT

## 2023-03-29 PROCEDURE — 85652 RBC SED RATE AUTOMATED: CPT

## 2023-03-29 PROCEDURE — 84439 ASSAY OF FREE THYROXINE: CPT

## 2023-03-29 PROCEDURE — 87186 SC STD MICRODIL/AGAR DIL: CPT | Performed by: PHYSICIAN ASSISTANT

## 2023-03-29 PROCEDURE — 87077 CULTURE AEROBIC IDENTIFY: CPT | Performed by: PHYSICIAN ASSISTANT

## 2023-03-29 PROCEDURE — 87070 CULTURE OTHR SPECIMN AEROBIC: CPT | Performed by: PHYSICIAN ASSISTANT

## 2023-03-29 RX ORDER — INSULIN GLARGINE 300 U/ML
10 INJECTION, SOLUTION SUBCUTANEOUS NIGHTLY
Qty: 6 ML | Refills: 0 | Status: SHIPPED | OUTPATIENT
Start: 2023-03-29

## 2023-03-29 RX ORDER — LISINOPRIL 10 MG/1
10 TABLET ORAL DAILY
Qty: 30 TABLET | Refills: 0 | Status: CANCELLED | OUTPATIENT
Start: 2023-03-29

## 2023-03-29 RX ORDER — METFORMIN HYDROCHLORIDE 500 MG/1
1000 TABLET, EXTENDED RELEASE ORAL 2 TIMES DAILY WITH MEALS
Qty: 360 TABLET | Refills: 1 | Status: CANCELLED | OUTPATIENT
Start: 2023-03-29

## 2023-03-29 RX ORDER — LANCETS
1 EACH MISCELLANEOUS 2 TIMES DAILY
Qty: 200 EACH | Refills: 3 | Status: SHIPPED | OUTPATIENT
Start: 2023-03-29

## 2023-03-29 RX ORDER — TIRZEPATIDE 5 MG/.5ML
5 INJECTION, SOLUTION SUBCUTANEOUS
Qty: 6 ML | Refills: 0 | Status: CANCELLED | OUTPATIENT
Start: 2023-03-29

## 2023-03-29 RX ORDER — LISINOPRIL 10 MG/1
10 TABLET ORAL DAILY
Qty: 90 TABLET | Refills: 0 | Status: SHIPPED | OUTPATIENT
Start: 2023-03-29 | End: 2023-04-07

## 2023-03-29 RX ORDER — METFORMIN HYDROCHLORIDE 500 MG/1
500 TABLET, EXTENDED RELEASE ORAL 2 TIMES DAILY
Qty: 180 TABLET | Refills: 0 | Status: SHIPPED | OUTPATIENT
Start: 2023-03-29

## 2023-03-29 RX ORDER — CEPHALEXIN 500 MG/1
500 CAPSULE ORAL 3 TIMES DAILY
Qty: 21 CAPSULE | Refills: 0 | Status: SHIPPED | OUTPATIENT
Start: 2023-03-29 | End: 2023-03-31

## 2023-03-29 RX ORDER — CARVEDILOL 12.5 MG/1
12.5 TABLET ORAL 2 TIMES DAILY
Qty: 180 TABLET | Refills: 0 | Status: SHIPPED | OUTPATIENT
Start: 2023-03-29

## 2023-03-29 RX ORDER — ROSUVASTATIN CALCIUM 10 MG/1
10 TABLET, COATED ORAL NIGHTLY
Qty: 90 TABLET | Refills: 0 | Status: CANCELLED | OUTPATIENT
Start: 2023-03-29

## 2023-03-29 RX ORDER — ROSUVASTATIN CALCIUM 10 MG/1
10 TABLET, COATED ORAL NIGHTLY
Qty: 90 TABLET | Refills: 0 | Status: SHIPPED | OUTPATIENT
Start: 2023-03-29

## 2023-03-29 SDOH — ECONOMIC STABILITY - INCOME SECURITY: PROBLEM RELATED TO HOUSING AND ECONOMIC CIRCUMSTANCES, UNSPECIFIED: Z59.9

## 2023-03-29 NOTE — ASSESSMENT & PLAN NOTE
Ulcer recurrence L great toe. Obtained wound culture.    Discussed my concern for osteomyelitis and potential need for toe amputation. ABIs were WNL on 9/7/22.   Check labs and x-ray of foot. Will order MRI if x-ray is negative.   Pt's previous wound culture in September grew citrobacter and streptococcus sp. Will start keflex pending results.   Refer to wound care.   RTC in 1 week to reassess and review results.

## 2023-03-29 NOTE — PROGRESS NOTES
"    Chief Complaint   Patient presents with   • injury to left great toe       HPI     Michelle Weaver is a 57 y.o. female with a PMH of uncontrolled diabetes and hypertension who presents for evaluation of \"chief complaint.\"     The patient was last seen in December. She was advised to follow-up in 6 weeks but cancelled that appointment. She has been lost to follow-up since that time and reports she is not taking any of her diabetes medications. She states her pharmacy told her she did not have refills of Levemir. According to our data, it appears there are plenty of refills available. Apparently there was a malfunctioning pen issue that was not resolved with her pharmacy. She does have some metformin but is not taking it right now. She was never able to get the Mounjaro. She states she is struggling to pay for medications. Her employer never found a light duty job for her. She was only released to light duty by infectious disease due to a left foot diabetic ulcer. She has been out of work since her admission in September. She states she is being placed on 100% disability. She has disability forms she is requesting completed also.     Her primary concern today is recurrence of a left great toe wound. She reports she reinjured her left great toe about 6 weeks ago. She was walking down the [place] at night and tripped over a piece of wood. She did not notice any problem until her child mentioned there was blood on her toe. She denies pain, fever, or chills. It has been draining.     She has not been checking her glucoses.  She ran out of lisinopril and states she did not take carvedilol this morning. She states her blood pressure has been in the 140s/70-80s. She feels this week it is higher due to marital stress.          Past Medical History:   Diagnosis Date   • Anxiety 9/5/2022   • Panic attacks 9/5/2022   • Primary hypertension 9/5/2022       Past Surgical History:   Procedure Laterality Date   • BREAST CYST " EXCISION Right     approx 2017       Family History   Problem Relation Age of Onset   • Cancer Mother    • No Known Problems Daughter    • No Known Problems Daughter    • No Known Problems Daughter    • No Known Problems Daughter        Social History     Socioeconomic History   • Marital status:    Tobacco Use   • Smoking status: Never   • Smokeless tobacco: Never   Vaping Use   • Vaping Use: Never used   Substance and Sexual Activity   • Alcohol use: No   • Drug use: No   • Sexual activity: Defer     Comment: .  Lives with        Allergies   Allergen Reactions   • Sulfa Antibiotics Rash       ROS    Review of Systems   Constitutional: Negative for chills, diaphoresis and fever.   Gastrointestinal: Negative for abdominal pain, nausea and vomiting.   Endocrine: Negative for polydipsia and polyuria.   Musculoskeletal: Positive for joint swelling. Negative for myalgias.   Skin: Positive for wound.       Vitals:    03/29/23 1021   BP: (!) 170/108   Pulse: 85   Temp: 96.9 °F (36.1 °C)     Body mass index is 37.35 kg/m².      Current Outpatient Medications:   •  Blood Glucose Monitoring Suppl (Contour Next One) device, Use twice daily as directed to check blood sugar., Disp: 1 each, Rfl: 0  •  carvedilol (COREG) 12.5 MG tablet, Take 1 tablet by mouth 2 (Two) Times a Day., Disp: 180 tablet, Rfl: 0  •  glucose blood (FREESTYLE LITE) test strip, Use as directed to check blood sugar twice daily, Disp: 200 each, Rfl: 3  •  Insulin Pen Needle (Pen Needles) 31G X 5 MM misc, 1 each Daily. Use to inject insulin once daily as directed, Disp: 100 each, Rfl: 1  •  lisinopril (PRINIVIL,ZESTRIL) 10 MG tablet, Take 1 tablet by mouth Daily., Disp: 90 tablet, Rfl: 0  •  metFORMIN ER (GLUCOPHAGE-XR) 500 MG 24 hr tablet, Take 1 tablet by mouth daily for 7 days then take 1 tablet by mouth twice daily, Disp: 180 tablet, Rfl: 0  •  Microlet Lancets misc, Use as directed to check blood sugar twice daily, Disp: 200 each,  Rfl: 3  •  rosuvastatin (Crestor) 10 MG tablet, Take 1 tablet by mouth Every Night., Disp: 90 tablet, Rfl: 0  •  cephalexin (Keflex) 500 MG capsule, Take 1 capsule by mouth 3 (Three) Times a Day for 7 days., Disp: 21 capsule, Rfl: 0  •  Insulin Glargine, 2 Unit Dial, (Toujeo Max SoloStar) 300 UNIT/ML solution pen-injector injection, Inject 10 Units under the skin into the appropriate area as directed Every Night., Disp: 6 mL, Rfl: 0    PE    Physical Exam  Vitals reviewed.   Constitutional:       General: She is not in acute distress.     Appearance: She is well-developed.   HENT:      Head: Normocephalic and atraumatic.   Eyes:      Conjunctiva/sclera: Conjunctivae normal.   Cardiovascular:      Rate and Rhythm: Normal rate and regular rhythm.      Pulses:           Dorsalis pedis pulses are 1+ on the right side and 1+ on the left side.        Posterior tibial pulses are 1+ on the right side and 1+ on the left side.      Heart sounds: Normal heart sounds. No murmur heard.  Pulmonary:      Effort: Pulmonary effort is normal.      Breath sounds: Normal breath sounds.   Musculoskeletal:      Cervical back: Normal range of motion.   Feet:      Right foot:      Protective Sensation: 6 sites tested. 3 sites sensed.      Skin integrity: Callus present.      Left foot:      Protective Sensation: 6 sites tested. 3 sites sensed.      Skin integrity: Ulcer and callus present.      Comments: Ulcer to medial plantar aspect of left great toe as picturred. Dried drainage soaked through bandage and coban noted. Left great toe soft tissue swelling with erythematous and darkened skin, particularly the dorsal aspect. No tenderness or streaking erythema to the dorsal foot.   Skin:     General: Skin is warm and dry.   Neurological:      Mental Status: She is alert.      Gait: Gait normal.   Psychiatric:         Speech: Speech normal.         Behavior: Behavior normal.              A/P    Problem List Items Addressed This Visit         Advance Directives and General Issues    Noncompliance with treatment plan       Cardiac and Vasculature    Hyperlipidemia    Current Assessment & Plan     Not taking statin. Resume.          Relevant Medications    rosuvastatin (Crestor) 10 MG tablet       Endocrine and Metabolic    Type 2 diabetes mellitus with hyperglycemia, without long-term current use of insulin (HCC)    Current Assessment & Plan     A1C today is 10.6, essentially unchanged from December.   Start Toujeo 10 units nightly and resume metformin 500 mg bid.   Counseled patient to monitor glucoses fasting and titrate Toujeo by 2 units once every 3 days to FBG <150.   Encouraged diabetic diet.   Will consult case management to see what resources may be available due to reported financial difficulty.            Relevant Medications    Insulin Glargine, 2 Unit Dial, (Toujeo Max SoloStar) 300 UNIT/ML solution pen-injector injection    metFORMIN ER (GLUCOPHAGE-XR) 500 MG 24 hr tablet    Other Relevant Orders    POC Glycosylated Hemoglobin (Hb A1C) (Completed)    Diabetic ulcer of toe of left foot associated with type 2 diabetes mellitus (HCC) - Primary    Overview     Lt great toe plantar surface ulceration since April or May of this year (2022).           Current Assessment & Plan     Ulcer recurrence L great toe. Obtained wound culture.    Discussed my concern for osteomyelitis and potential need for toe amputation. ABIs were WNL on 9/7/22.   Check labs and x-ray of foot. Will order MRI if x-ray is negative.   Pt's previous wound culture in September grew citrobacter and streptococcus sp. Will start keflex pending results.   Refer to wound care.   RTC in 1 week to reassess and review results.          Relevant Medications    Insulin Glargine, 2 Unit Dial, (Toujeo Max SoloStar) 300 UNIT/ML solution pen-injector injection    metFORMIN ER (GLUCOPHAGE-XR) 500 MG 24 hr tablet    Other Relevant Orders    XR Foot 3+ View Left (Completed)    Ambulatory  Referral to Physical Therapy Evaluate and treat, Wound Care    Wound Culture - Wound, Toe, Left       Other    Cellulitis of great toe of left foot    Relevant Orders    CBC & Differential (Completed)    Comprehensive Metabolic Panel (Completed)    Blood Culture - Blood,    C-reactive Protein (Completed)    Sedimentation Rate (Completed)    Blood Culture - Blood,   Other Visit Diagnoses     Financial difficulties        Relevant Orders    Ambulatory Referral to Case Management Gaps in Care, HRCM - High Risk Care Management, Medication Adherence           Plan of care was reviewed with patient at the conclusion of today's visit. Education was provided regarding diagnoses, management, prescribed or recommended OTC products, and the importance of compliance with follow-up appointments. The patient was counseled regarding the risks, benefits, and possible side-effects of treatment. I advised the patient to keep me informed of any acute changes in their status including new, worsening, or persistent symptoms. Patient expresses understanding and agreement with the management plan.        JOSE GUADALUPE Wyatt

## 2023-03-29 NOTE — ASSESSMENT & PLAN NOTE
A1C today is 10.6, essentially unchanged from December.   Start Toujeo 10 units nightly and resume metformin 500 mg bid.   Counseled patient to monitor glucoses fasting and titrate Toujeo by 2 units once every 3 days to FBG <150.   Encouraged diabetic diet.   Will consult case management to see what resources may be available due to reported financial difficulty.

## 2023-03-31 LAB
BACTERIA SPEC AEROBE CULT: ABNORMAL
GRAM STN SPEC: ABNORMAL
GRAM STN SPEC: ABNORMAL

## 2023-03-31 RX ORDER — LINEZOLID 600 MG/1
600 TABLET, FILM COATED ORAL 2 TIMES DAILY
Qty: 20 TABLET | Refills: 0 | Status: SHIPPED | OUTPATIENT
Start: 2023-03-31 | End: 2023-04-10

## 2023-04-03 LAB
BACTERIA SPEC AEROBE CULT: NORMAL
BACTERIA SPEC AEROBE CULT: NORMAL

## 2023-04-05 ENCOUNTER — APPOINTMENT (OUTPATIENT)
Dept: PHYSICAL THERAPY | Facility: HOSPITAL | Age: 58
End: 2023-04-05
Payer: COMMERCIAL

## 2023-04-06 DIAGNOSIS — Z12.39 SCREENING BREAST EXAMINATION: Primary | ICD-10-CM

## 2023-04-06 DIAGNOSIS — Z12.31 ENCOUNTER FOR SCREENING MAMMOGRAM FOR MALIGNANT NEOPLASM OF BREAST: ICD-10-CM

## 2023-04-07 ENCOUNTER — HOSPITAL ENCOUNTER (OUTPATIENT)
Dept: PHYSICAL THERAPY | Facility: HOSPITAL | Age: 58
Setting detail: THERAPIES SERIES
Discharge: HOME | End: 2023-04-07
Payer: COMMERCIAL

## 2023-04-07 ENCOUNTER — OFFICE VISIT (OUTPATIENT)
Dept: FAMILY MEDICINE CLINIC | Facility: CLINIC | Age: 58
End: 2023-04-07
Payer: COMMERCIAL

## 2023-04-07 ENCOUNTER — PATIENT OUTREACH (OUTPATIENT)
Dept: CASE MANAGEMENT | Facility: OTHER | Age: 58
End: 2023-04-07
Payer: COMMERCIAL

## 2023-04-07 VITALS
DIASTOLIC BLOOD PRESSURE: 96 MMHG | OXYGEN SATURATION: 93 % | SYSTOLIC BLOOD PRESSURE: 150 MMHG | HEART RATE: 90 BPM | BODY MASS INDEX: 37.2 KG/M2 | WEIGHT: 237 LBS | HEIGHT: 67 IN

## 2023-04-07 DIAGNOSIS — E11.621 DIABETIC ULCER OF TOE OF LEFT FOOT ASSOCIATED WITH TYPE 2 DIABETES MELLITUS, UNSPECIFIED ULCER STAGE: Primary | ICD-10-CM

## 2023-04-07 DIAGNOSIS — S91.102D OPEN WOUND OF LEFT GREAT TOE, SUBSEQUENT ENCOUNTER: Primary | ICD-10-CM

## 2023-04-07 DIAGNOSIS — L03.032 CELLULITIS OF GREAT TOE OF LEFT FOOT: ICD-10-CM

## 2023-04-07 DIAGNOSIS — L97.529 DIABETIC ULCER OF TOE OF LEFT FOOT ASSOCIATED WITH TYPE 2 DIABETES MELLITUS, UNSPECIFIED ULCER STAGE: Primary | ICD-10-CM

## 2023-04-07 DIAGNOSIS — L97.529 DIABETIC ULCER OF TOE OF LEFT FOOT ASSOCIATED WITH TYPE 2 DIABETES MELLITUS, UNSPECIFIED ULCER STAGE: ICD-10-CM

## 2023-04-07 DIAGNOSIS — I10 PRIMARY HYPERTENSION: Chronic | ICD-10-CM

## 2023-04-07 DIAGNOSIS — E11.621 DIABETIC ULCER OF TOE OF LEFT FOOT ASSOCIATED WITH TYPE 2 DIABETES MELLITUS, UNSPECIFIED ULCER STAGE: ICD-10-CM

## 2023-04-07 PROCEDURE — 99214 OFFICE O/P EST MOD 30 MIN: CPT | Performed by: PHYSICIAN ASSISTANT

## 2023-04-07 PROCEDURE — 97597 DBRDMT OPN WND 1ST 20 CM/<: CPT

## 2023-04-07 PROCEDURE — 97161 PT EVAL LOW COMPLEX 20 MIN: CPT

## 2023-04-07 RX ORDER — LISINOPRIL 20 MG/1
20 TABLET ORAL DAILY
Qty: 90 TABLET | Refills: 0 | Status: SHIPPED | OUTPATIENT
Start: 2023-04-07

## 2023-04-07 NOTE — THERAPY EVALUATION
Outpatient Rehabilitation - Wound/Debridement Initial Eval  [facility]     Patient Name: Michelle Weaver  : 1965  MRN: [id number]  Today's Date: 2023                  Admit Date: 2023    Visit Dx:    ICD-10-CM ICD-9-CM   1. Open wound of left great toe, subsequent encounter  S91.102D V58.89     893.0   2. Diabetic ulcer of toe of left foot associated with type 2 diabetes mellitus, unspecified ulcer stage  E11.621 250.80    L97.529 707.15       Patient Active Problem List   Diagnosis   • Cellulitis of left leg   • Diabetic ulcer of toe of left foot associated with type 2 diabetes mellitus   • Anxiety   • Panic attacks   • Primary hypertension   • Hyperglycemia   • Type 2 diabetes mellitus with hyperglycemia, without long-term current use of insulin   • Hyperlipidemia   • Cellulitis of great toe of left foot   • Noncompliance with treatment plan        Past Medical History:   Diagnosis Date   • Anxiety 2022   • Panic attacks 2022   • Primary hypertension 2022        Past Surgical History:   Procedure Laterality Date   • BREAST CYST EXCISION Right     approx 2017        Patient History     Row Name 23 1100             History    Chief Complaint Ulcer, wound or other skin conditions  -      Brief Description of Current Complaint Pt with recurrence of L great toe diabetic ulcer. Pt stubbed her toe on a piece of wood and the area seemed to open up after that. She is on oral abx and has had an XR due to concern for osteomyelitis (no evidence of osteo on XR). She presents for wound care follow up. Her A1c has been elevated, but she reports it is better than it has been lately.  -      Patient/Caregiver Goals Heal wound;Know what to do to help the symptoms  -      Occupation/sports/leisure activities Retired  -      Patient seeing anyone else for problem(s)? PCP, Milad Patterson  -      How has patient tried to help current problem? Pt has kept the area clean and covered with  honey hydrofiber dressings she got OTC.  -MC      Related/Recent Hospitalizations No  -MC      Are you or can you be pregnant No  -MC         Pain     Pain Location Toe (Comment which one)  -MC      Pain at Present 0  -MC         Services    Are you currently receiving Home Health services No  -MC      Do you plan to receive Home Health services in the near future No  -MC         Daily Activities    Primary Language English  -      Are you able to read Yes  -MC      Are you able to write Yes  -MC      How does patient learn best? Listening;Demonstration  -      Teaching needs identified Management of Condition  -MC      Patient is concerned about/has problems with Other (comment)  wound mgmt  -MC      Barriers to learning None  -MC      Pt Participated in POC and Goals Yes  -MC         Safety    Are you being hurt, hit, or frightened by anyone at home or in your life? No  -MC      Are you being neglected by a caregiver No  -MC            User Key  (r) = Recorded By, (t) = Taken By, (c) = Cosigned By    Initials Name Provider Type    Beth Johnson PT Physical Therapist                EVALUATION   PT Ortho     Row Name 04/07/23 1200       Subjective Pain    Able to rate subjective pain? yes  -MC    Pre-Treatment Pain Level 0  -MC    Post-Treatment Pain Level 0  -MC       Transfers    Comment, (Transfers) remained seated for tx  -MC          User Key  (r) = Recorded By, (t) = Taken By, (c) = Cosigned By    Initials Name Provider Type    Beth Johnson PT Physical Therapist               LDA Wound     Row Name 04/07/23 1200             Wound 04/07/23 1130 Left medial great toe Diabetic Ulcer    Wound - Properties Group Placement Date: 04/07/23  - Placement Time: 1130  -MC Present on Hospital Admission: Y  - Side: Left  - Orientation: medial  - Location: great toe  -MC Primary Wound Type: Diabetic ulc  -    Dressing Appearance intact;moist drainage  -      Base moist;pink;red  spongy but  dark pink/red base  -      Periwound intact;dry  -      Periwound Temperature cool  -      Periwound Skin Turgor firm  -      Edges callused;irregular;open  -      Wound Length (cm) 0.6 cm  -      Wound Width (cm) 0.5 cm  -      Wound Depth (cm) 0.4 cm  -      Wound Surface Area (cm^2) 0.3 cm^2  -      Wound Volume (cm^3) 0.12 cm^3  -      Drainage Characteristics/Odor serosanguineous  -      Drainage Amount scant  -      Care, Wound cleansed with;wound cleanser;debrided;honey applied  -      Dressing Care dressing applied;silver impregnated;collagen;low-adherent;foam  honey, asia, mepilex Ag, conform/coban  -      Periwound Care cleansed with pH balanced cleanser;dry periwound area maintained  -      Retired Wound - Properties Group Placement Date: 04/07/23  - Placement Time: 1130  - Present on Hospital Admission: Y  - Side: Left  - Orientation: medial  - Location: great toe  -MC Primary Wound Type: Diabetic ulc  -    Retired Wound - Properties Group Date first assessed: 04/07/23  - Time first assessed: 1130  - Present on Hospital Admission: Y  - Side: Left  - Location: great toe  -MC Primary Wound Type: Diabetic ulc  -          User Key  (r) = Recorded By, (t) = Taken By, (c) = Cosigned By    Initials Name Provider Type    Beth Johnson, PT Physical Therapist                  WOUND DEBRIDEMENT  Total area of Debridement: 3 cm2  Debridement Site 1  Location- Site 1: L great toe ulcer and periwound  Selective Debridement- Site 1: Wound Surface <20cmsq  Instruments- Site 1: #15, scapel, tweezers  Excised Tissue Description- Site 1: slough, moderate, other (comment) (callus)  Bleeding- Site 1: scant, held pressure, 3-5 minutes, AgNitrate sticks              Therapy Education     Row Name 04/07/23 1200             Therapy Education    Education Details Reviewed s/sx of infection and PT role in wound care. Emphasized importance of blood glucose control to  promote healing. Change dressing every 2-3 days with theraworx/gauze to clean, honey/asia to pack, mepilex Ag to cover, and conform/coban to secure. Keep wound dry between changes.  -      Given Bandaging/dressing change;Symptoms/condition management  -      Program New  -      How Provided Verbal;Demonstration  -      Provided to Patient  -      Level of Understanding Teach back education performed;Verbalized  -            User Key  (r) = Recorded By, (t) = Taken By, (c) = Cosigned By    Initials Name Provider Type     Beth Banks, PT Physical Therapist                Recommendation and Plan   PT Assessment/Plan     Row Name 04/07/23 1200          PT Assessment    Functional Limitations Performance in self-care ADL;Other (comment)  wound mgmt  -     Impairments Integumentary integrity;Impaired sensory integrity  -     Assessment Comments Pt presents with open wound to the L great toe in the presence of uncontrolled type 2 diabetes. After thorough debridement of the wound base and periwound callus, the wound is moist and red/pink with a somewhat spongy texture. Pt will benefit from skilled PT wound care for debridement, advanced dressings management, and other appropriate interventions to promote healing.  -     Rehab Potential Fair  -     Patient/caregiver participated in establishment of treatment plan and goals Yes  -     Patient would benefit from skilled therapy intervention Yes  -        PT Plan    PT Frequency 1x/week  -     Predicted Duration of Therapy Intervention (PT) 12 visits  -     Planned CPT's? PT EVAL LOW COMPLEXITY: 45648;PT SELF CARE/MGMT/TRAIN 15 MIN: 10300;PT NONSELECT DEBRIDE 15 MIN: 99887;PT SHERLY DEBRIDE OPEN WOUND UP TO 20 CM: 74837  -     Physical Therapy Interventions (Optional Details) wound care;patient/family education  -     PT Plan Comments debridement, dressings, MIST NOT covered  -           User Key  (r) = Recorded By, (t) = Taken By, (c)  = Cosigned By    Initials Name Provider Type    Beth Johnson PT Physical Therapist                  Goals   PT OP Goals     Row Name 04/07/23 1200          PT Short Term Goals    STG 1 Pt to have no s/s of infection to L ankle wound.  -     STG 1 Progress New  -     STG 2 Pt will demonstrate 25% reduction in wound area to indicate healing progress.  -     STG 2 Progress New  -        Long Term Goals    LTG 1 Pt will demonstrate independence with clean home dressing changes.  -     LTG 1 Progress New  -     LTG 2 Pt will demonstrate 90% reduction in wound area to indicate healing progress.  -     LTG 2 Progress New  -        Time Calculation    PT Goal Re-Cert Due Date 07/06/23  -           User Key  (r) = Recorded By, (t) = Taken By, (c) = Cosigned By    Initials Name Provider Type    Beth Johnson PT Physical Therapist                Time Calculation: Start Time: 1130  Untimed Charges  PT Eval/Re-eval Minutes: 45  Wound Care: 05401 Selective debridement  13344-[id number] debridement: 15  Total Minutes  Untimed Charges Total Minutes: 60   Total Minutes: 60  Therapy Charges for Today     Code Description Service Date Service Provider Modifiers Qty    68747964739 HC PT EVAL LOW COMPLEXITY 3 4/7/2023 Beth Banks, PT GP 1    88791924407 HC SHERLY DEBRIDE OPEN WOUND UP TO 20CM 4/7/2023 Beth Banks, PT GP 1                Beth Banks, PT  4/7/2023

## 2023-04-07 NOTE — OUTREACH NOTE
Adult Patient Profile  Questions/Answers    Flowsheet Row Most Recent Value   Symptoms/Conditions Managed at Home diabetes, type 2, cardiovascular   Barriers to Managing Health financial resources   Cardiovascular Symptoms/Conditions hypertension   Cardiovascular Management Strategies medication therapy, diet modification   Cardiovascular Self-Management Outcome 3 (uncertain)   Diabetes Management Strategies blood glucose testing, diet modification, medication therapy, routine screenings   Frequency of Blood Glucose Testing other (see comments)  [twice daily]   Last A1C Result 10.6   Diabetes Self-Management Outcome 3 (uncertain)   Missed Doses of Prescribed Medications During Past Week no   Taken Prescribed Medications at Different Time or Schedule During Past Week no   Taken More or Less Medication Than Prescribed no   Barriers to Taking Medication as Prescribed none   How to be Addressed Michelle   How Well Do You Speak English? very well   Source of Information patient        Adult Wellness Assessment  Questions/Answers    Flowsheet Row Most Recent Value   Help with Reading Health-Related Information never   Problems Learning about Medical Condition never   Confidence in Filling Out Forms by Self always        Send Education  Questions/Answers    Flowsheet Row Most Recent Value   Other Patient Education/Resources  24/7 [facility] Nurse Call Line   24/7 Nurse Call Line Education Method Send Materials      AMBULATORY CASE MANAGEMENT NOTE    Name and Relationship of Patient/Support Person: Michelle Weaver - Self        Education Documentation  Blood Pressure Monitoring, taught by Hailee Webb, RN at 4/7/2023  4:45 PM.  Learner: Patient  Readiness: Acceptance  Method: Explanation  Response: Verbalizes Understanding    Blood Glucose Monitoring, taught by Hailee Webb, RN at 4/7/2023  4:45 PM.  Learner: Patient  Readiness: Acceptance  Method: Explanation  Response: Verbalizes Understanding    A1C Goal, taught by  Hailee Webb, RN at 4/7/2023  4:45 PM.  Learner: Patient  Readiness: Acceptance  Method: Explanation  Response: Verbalizes Understanding      Patient Outreach    Call to patient for f/u of referral. Patient states she is currently being treated for diabetes and HTN and wound on toe. She has 5 more days of antibiotics for the would treatment. She is being seen at the wound clinic and healing nicely. She states she was told for many years she was prediabetic and did not realize is was type 2 until her recent hospitalization. She was on metformin daily she is now on Toujeo qhs and taking metformin bid. She c/o of a little problem with GI issues but states she is also taking the antibiotics so not sure yet what is causing the problem. BP was also elevated in MD office so Lisinopril was increased to 20mg daily. Most recent a1c improved to 10.6. Pt does understand the goal is to get to 7 or less. BS today was 217. She is increasing her insulin q3 days per MD titration orders. Educated patient on [place] program available and encouraged enrollment and participation. Patient v/u. Pt does express some financial concerns with healthcare cost and will provide resources available. No other SDOH deficits noted at this time. Pt agrees to continued f/u.    HAILEE REID  Ambulatory Case Management    4/7/2023, 16:47 EDT

## 2023-04-07 NOTE — PROGRESS NOTES
Chief Complaint   Patient presents with   • Diabetic ulcer of toe of left foot associated with type 2 d     1 week f/u    • Hypertension       HPI     Michelle Weaver is a 58 y.o. female who is here for 1-week follow-up of uncontrolled diabetes and hypertension.    She was able to start  Toujeo; she is using 13 units daily. Also recently restarted metformin 500 mg once daily. She plans to go up to twice daily as recommended. She has an MRI of her left foot tomorrow to r/o osteomyelitis of her left great toe. Saw wound care earlier today. Her wound culture last week showed Entercoccus sp sensitive to vancomycin. Keflex was changed to linezolid. She notes improvement in swelling.       Past Medical History:   Diagnosis Date   • Anxiety 9/5/2022   • Panic attacks 9/5/2022   • Primary hypertension 9/5/2022       Past Surgical History:   Procedure Laterality Date   • BREAST CYST EXCISION Right     approx 2017       Family History   Problem Relation Age of Onset   • Cancer Mother    • No Known Problems Daughter    • No Known Problems Daughter    • No Known Problems Daughter    • No Known Problems Daughter        Social History     Socioeconomic History   • Marital status:    Tobacco Use   • Smoking status: Never   • Smokeless tobacco: Never   Vaping Use   • Vaping Use: Never used   Substance and Sexual Activity   • Alcohol use: No   • Drug use: No   • Sexual activity: Defer     Comment: .  Lives with        Allergies   Allergen Reactions   • Sulfa Antibiotics Rash       ROS    Review of Systems   Constitutional: Negative for chills, diaphoresis and fever.   Endocrine: Negative for polydipsia and polyuria.       Vitals:    04/07/23 1029   BP: 150/96   Pulse: 90   SpO2: 93%     Body mass index is 37.2 kg/m².      Current Outpatient Medications:   •  Blood Glucose Monitoring Suppl (Contour Next One) device, Use twice daily as directed to check blood sugar., Disp: 1 each, Rfl: 0  •  carvedilol  (COREG) 12.5 MG tablet, Take 1 tablet by mouth 2 (Two) Times a Day., Disp: 180 tablet, Rfl: 0  •  glucose blood (FREESTYLE LITE) test strip, Use as directed to check blood sugar twice daily, Disp: 200 each, Rfl: 3  •  Insulin Glargine, 2 Unit Dial, (Toujeo Max SoloStar) 300 UNIT/ML solution pen-injector injection, Inject 10 Units under the skin into the appropriate area as directed Every Night., Disp: 6 mL, Rfl: 0  •  Insulin Pen Needle (Pen Needles) 31G X 5 MM misc, 1 each Daily. Use to inject insulin once daily as directed, Disp: 100 each, Rfl: 1  •  linezolid (ZYVOX) 600 MG tablet, Take 1 tablet by mouth 2 (Two) Times a Day for 10 days., Disp: 20 tablet, Rfl: 0  •  lisinopril (PRINIVIL,ZESTRIL) 20 MG tablet, Take 1 tablet by mouth Daily., Disp: 90 tablet, Rfl: 0  •  metFORMIN ER (GLUCOPHAGE-XR) 500 MG 24 hr tablet, Take 1 tablet by mouth daily for 7 days then take 1 tablet by mouth twice daily, Disp: 180 tablet, Rfl: 0  •  Microlet Lancets misc, Use as directed to check blood sugar twice daily, Disp: 200 each, Rfl: 3  •  rosuvastatin (Crestor) 10 MG tablet, Take 1 tablet by mouth Every Night., Disp: 90 tablet, Rfl: 0    PE    Physical Exam  Vitals reviewed.   Constitutional:       General: She is not in acute distress.     Appearance: She is well-developed.   HENT:      Head: Normocephalic and atraumatic.   Eyes:      Conjunctiva/sclera: Conjunctivae normal.   Cardiovascular:      Rate and Rhythm: Normal rate and regular rhythm.      Heart sounds: Normal heart sounds. No murmur heard.  Pulmonary:      Effort: Pulmonary effort is normal.      Breath sounds: Normal breath sounds.   Musculoskeletal:      Cervical back: Normal range of motion.   Feet:      Comments: Left great toe bandage in place. Swelling appears decreased compared to prior exam.   Skin:     General: Skin is warm and dry.   Neurological:      Mental Status: She is alert.      Gait: Gait normal.   Psychiatric:         Speech: Speech normal.          Behavior: Behavior normal.         Results    Results for orders placed or performed in visit on 03/29/23   Blood Culture - Blood, Arm, Left    Specimen: Arm, Left; Blood   Result Value Ref Range    Blood Culture No growth at 5 days    Blood Culture - Blood, Arm, Right    Specimen: Arm, Right; Blood   Result Value Ref Range    Blood Culture No growth at 5 days    Comprehensive Metabolic Panel    Specimen: Blood   Result Value Ref Range    Glucose 292 (H) 65 - 99 mg/dL    BUN 12 6 - 20 mg/dL    Creatinine 0.80 0.57 - 1.00 mg/dL    Sodium 137 136 - 145 mmol/L    Potassium 4.3 3.5 - 5.2 mmol/L    Chloride 99 98 - 107 mmol/L    CO2 23.5 22.0 - 29.0 mmol/L    Calcium 10.1 8.6 - 10.5 mg/dL    Total Protein 7.9 6.0 - 8.5 g/dL    Albumin 4.1 3.5 - 5.2 g/dL    ALT (SGPT) 12 1 - 33 U/L    AST (SGOT) 15 1 - 32 U/L    Alkaline Phosphatase 105 39 - 117 U/L    Total Bilirubin 1.0 0.0 - 1.2 mg/dL    Globulin 3.8 gm/dL    A/G Ratio 1.1 g/dL    BUN/Creatinine Ratio 15.0 7.0 - 25.0    Anion Gap 14.5 5.0 - 15.0 mmol/L    eGFR 85.5 >60.0 mL/min/1.73   C-reactive Protein    Specimen: Blood   Result Value Ref Range    C-Reactive Protein 0.80 (H) 0.00 - 0.50 mg/dL   Sedimentation Rate    Specimen: Blood   Result Value Ref Range    Sed Rate 38 (H) 0 - 30 mm/hr   CBC Auto Differential    Specimen: Blood   Result Value Ref Range    WBC 9.63 3.40 - 10.80 10*3/mm3    RBC 5.26 3.77 - 5.28 10*6/mm3    Hemoglobin 14.1 12.0 - 15.9 g/dL    Hematocrit 42.3 34.0 - 46.6 %    MCV 80.4 79.0 - 97.0 fL    MCH 26.8 26.6 - 33.0 pg    MCHC 33.3 31.5 - 35.7 g/dL    RDW 13.0 12.3 - 15.4 %    RDW-SD 37.8 37.0 - 54.0 fl    MPV 12.2 (H) 6.0 - 12.0 fL    Platelets 295 140 - 450 10*3/mm3    Neutrophil % 68.6 42.7 - 76.0 %    Lymphocyte % 24.2 19.6 - 45.3 %    Monocyte % 6.0 5.0 - 12.0 %    Eosinophil % 0.4 0.3 - 6.2 %    Basophil % 0.4 0.0 - 1.5 %    Immature Grans % 0.4 0.0 - 0.5 %    Neutrophils, Absolute 6.60 1.70 - 7.00 10*3/mm3    Lymphocytes, Absolute 2.33  0.70 - 3.10 10*3/mm3    Monocytes, Absolute 0.58 0.10 - 0.90 10*3/mm3    Eosinophils, Absolute 0.04 0.00 - 0.40 10*3/mm3    Basophils, Absolute 0.04 0.00 - 0.20 10*3/mm3    Immature Grans, Absolute 0.04 0.00 - 0.05 10*3/mm3    nRBC 0.0 0.0 - 0.2 /100 WBC   TSH Rfx On Abnormal To Free T4    Specimen: Blood   Result Value Ref Range    TSH 4.530 (H) 0.270 - 4.200 uIU/mL   T4, Free    Specimen: Blood   Result Value Ref Range    Free T4 1.22 0.93 - 1.70 ng/dL       A/P    Problem List Items Addressed This Visit        Cardiac and Vasculature    Primary hypertension (Chronic)  -Uncontrolled. Increase lisinopril to 20 mg daily.   -RTC in 2 weeks for blood pressure check, sooner prn.    Relevant Medications    lisinopril (PRINIVIL,ZESTRIL) 20 MG tablet       Endocrine and Metabolic    Diabetic ulcer of toe of left foot associated with type 2 diabetes mellitus - Primary  -Complete linezolid and follow-up up with wound care until healed.   -Will review MRI of foot when results are available.   -Discussed importance of glycemic control for healing.        Other    Cellulitis of great toe of left foot  -Clinically improved on exam today.   -Complete antibiotics.        Plan of care was reviewed with patient at the conclusion of today's visit. Education was provided regarding diagnoses, management, and the importance of keeping follow-up appointments. The patient was counseled regarding the risks, benefits, and possible side-effects of treatment. Patient and/or family express understanding and agreement with the management plan.        JOSE GUADALUPE Wyatt

## 2023-04-08 ENCOUNTER — HOSPITAL ENCOUNTER (OUTPATIENT)
Dept: MRI IMAGING | Facility: HOSPITAL | Age: 58
Discharge: HOME | End: 2023-04-08
Admitting: PHYSICIAN ASSISTANT
Payer: COMMERCIAL

## 2023-04-08 DIAGNOSIS — L97.529 DIABETIC ULCER OF TOE OF LEFT FOOT ASSOCIATED WITH TYPE 2 DIABETES MELLITUS, UNSPECIFIED ULCER STAGE: ICD-10-CM

## 2023-04-08 DIAGNOSIS — L03.032 CELLULITIS OF GREAT TOE OF LEFT FOOT: ICD-10-CM

## 2023-04-08 DIAGNOSIS — E11.621 DIABETIC ULCER OF TOE OF LEFT FOOT ASSOCIATED WITH TYPE 2 DIABETES MELLITUS, UNSPECIFIED ULCER STAGE: ICD-10-CM

## 2023-04-08 PROCEDURE — 73718 MRI LOWER EXTREMITY W/O DYE: CPT

## 2023-04-12 ENCOUNTER — HOSPITAL ENCOUNTER (OUTPATIENT)
Dept: PHYSICAL THERAPY | Facility: HOSPITAL | Age: 58
Setting detail: THERAPIES SERIES
Discharge: HOME | End: 2023-04-12
Payer: COMMERCIAL

## 2023-04-12 DIAGNOSIS — L97.529 DIABETIC ULCER OF TOE OF LEFT FOOT ASSOCIATED WITH TYPE 2 DIABETES MELLITUS, UNSPECIFIED ULCER STAGE: ICD-10-CM

## 2023-04-12 DIAGNOSIS — E11.621 DIABETIC ULCER OF TOE OF LEFT FOOT ASSOCIATED WITH TYPE 2 DIABETES MELLITUS, UNSPECIFIED ULCER STAGE: ICD-10-CM

## 2023-04-12 DIAGNOSIS — S91.102D OPEN WOUND OF LEFT GREAT TOE, SUBSEQUENT ENCOUNTER: Primary | ICD-10-CM

## 2023-04-12 PROCEDURE — 97597 DBRDMT OPN WND 1ST 20 CM/<: CPT

## 2023-04-12 NOTE — THERAPY WOUND CARE TREATMENT
Outpatient Rehabilitation - Wound/Debridement Treatment Note  [facility]     Patient Name: Michelle Weaver  : 1965  MRN: [id number]  Today's Date: 2023                 Admit Date: 2023    Visit Dx:    ICD-10-CM ICD-9-CM   1. Open wound of left great toe, subsequent encounter  S91.102D V58.89     893.0   2. Diabetic ulcer of toe of left foot associated with type 2 diabetes mellitus, unspecified ulcer stage  E11.621 250.80    L97.529 707.15     L great toe        Patient Active Problem List   Diagnosis   • Cellulitis of left leg   • Diabetic ulcer of toe of left foot associated with type 2 diabetes mellitus   • Anxiety   • Panic attacks   • Primary hypertension   • Hyperglycemia   • Type 2 diabetes mellitus with hyperglycemia, without long-term current use of insulin   • Hyperlipidemia   • Cellulitis of great toe of left foot   • Noncompliance with treatment plan        Past Medical History:   Diagnosis Date   • Anxiety 2022   • Panic attacks 2022   • Primary hypertension 2022        Past Surgical History:   Procedure Laterality Date   • BREAST CYST EXCISION Right     approx 2017         EVALUATION   PT Ortho     Row Name 23 1100       Subjective Comments    Subjective Comments Pt reports she has not changed her dressing since her previous session. No new issues/ complaints.  -       Subjective Pain    Able to rate subjective pain? yes  -    Pre-Treatment Pain Level 0  -LH    Post-Treatment Pain Level 0  -       Transfers    Comment, (Transfers) remained seated for tx  -          User Key  (r) = Recorded By, (t) = Taken By, (c) = Cosigned By    Initials Name Provider Type     Conrado Ryan, PT Physical Therapist                 [facility] Wound     Row Name 23 1100             Wound 23 1130 Left medial great toe Diabetic Ulcer    Wound - Properties Group Placement Date: 23  - Placement Time: 1130  - Present on Hospital Admission: Y  - Side: Left   "- Orientation: medial  - Location: great toe  [facility]    Wound Image Images linked: 1  -      Dressing Appearance intact;moist drainage  -      Base moist;pink;red  spongy but dark pink/red base  -      Periwound intact;dry  -      Periwound Temperature cool  -      Periwound Skin Turgor firm  -      Edges callused;irregular;open  -      Wound Length (cm) 0.5 cm  -      Wound Width (cm) 0.5 cm  -      Wound Depth (cm) 0.4 cm  -      Wound Surface Area (cm^2) 0.25 cm^2  -      Wound Volume (cm^3) 0.1 cm^3  -      Drainage Characteristics/Odor serosanguineous  -      Drainage Amount scant  -      Care, Wound cleansed with;wound cleanser;debrided;honey applied  -      Dressing Care dressing applied;silver impregnated;collagen;low-adherent;foam  Therahoney, asia, mepilex Ag, 1\" conform/coban  -      Periwound Care cleansed with pH balanced cleanser;dry periwound area maintained  -      Retired Wound - Properties Group Placement Date: 04/07/23  - Placement Time: 1130  - Present on Hospital Admission: Y  - Side: Left  - Orientation: medial  - Location: great toe  [facility]    Retired Wound - Properties Group Date first assessed: 04/07/23  - Time first assessed: 1130  - Present on Hospital Admission: Y  - Side: Left  - Location: great toe  - Primary Wound Type: Diabetic [facility]          User Key  (r) = Recorded By, (t) = Taken By, (c) = Cosigned By    Initials Name Provider Type    Beth Johnson, PT Physical Therapist     Conrado Ryan, PT Physical Therapist                  WOUND DEBRIDEMENT  Total area of Debridement: 3cm2  Debridement Site 1  Location- Site 1: L great toe ulcer and periwound  Selective Debridement- Site 1: Wound Surface <20cmsq  Instruments- Site 1: #15, scapel, tweezers  Excised Tissue Description- Site 1: scant, slough, moderate, other (comment) (periwound " callus)  Bleeding- Site 1: none              Therapy Education     Row Name 04/12/23 1100             Therapy Education    Education Details Reviewed importance of NWB as much as possible for optimal wound healing.  -      Given Bandaging/dressing change;Symptoms/condition management  -      Program Reinforced  -      How Provided Verbal;Demonstration  -      Provided to Patient  -      Level of Understanding Teach back education performed;Verbalized  -            User Key  (r) = Recorded By, (t) = Taken By, (c) = Cosigned By    Initials Name Provider Type     Conrado Ryan, PT Physical Therapist                Recommendation and Plan   PT Assessment/Plan     Row Name 04/12/23 1100          PT Assessment    Functional Limitations Performance in self-care ADL;Other (comment)  wound mgmt  -     Impairments Integumentary integrity;Impaired sensory integrity  -     Assessment Comments Pt demosntrating small improvements in wound dimensions and granulation of wound base. Pt continuing to require debridement of periwound callus to reduce potential further skin breakdown. PT plans to continue current POC to promote wound healing.  -     Rehab Potential Fair  -     Patient/caregiver participated in establishment of treatment plan and goals Yes  -     Patient would benefit from skilled therapy intervention Yes  -        PT Plan    PT Frequency 1x/week  -     Physical Therapy Interventions (Optional Details) wound care;patient/family education  -     PT Plan Comments debridement, dressings, MIST NOT covered  -           User Key  (r) = Recorded By, (t) = Taken By, (c) = Cosigned By    Initials Name Provider Type     Conrado Ryan, PT Physical Therapist                Goals   PT OP Goals     Row Name 04/12/23 1154          Time Calculation    PT Goal Re-Cert Due Date 07/06/23  -           User Key  (r) = Recorded By, (t) = Taken By, (c) = Cosigned By    Initials Name Provider Type      Conrado Ryan, PT Physical Therapist                PT Goal Re-Cert Due Date: 07/06/23            Time Calculation: Start Time: 1030  Untimed Charges  [id number] debridement: 15  Total Minutes  Untimed Charges Total Minutes: 15   Total Minutes: 15  Therapy Charges for Today     Code Description Service Date Service Provider Modifiers Qty    39182599948 HC SHERLY DEBRIDE OPEN WOUND UP TO 20CM 4/12/2023 Conrado Ryan, PT GP 1                  oCnrado Ryan, PT  4/12/2023

## 2023-04-26 ENCOUNTER — HOSPITAL ENCOUNTER (OUTPATIENT)
Dept: PHYSICAL THERAPY | Facility: HOSPITAL | Age: 58
Setting detail: THERAPIES SERIES
Discharge: HOME | End: 2023-04-26
Payer: COMMERCIAL

## 2023-04-26 DIAGNOSIS — S91.102D OPEN WOUND OF LEFT GREAT TOE, SUBSEQUENT ENCOUNTER: Primary | ICD-10-CM

## 2023-04-26 PROCEDURE — 97597 DBRDMT OPN WND 1ST 20 CM/<: CPT

## 2023-04-26 NOTE — THERAPY DISCHARGE NOTE
Outpatient Rehabilitation - Wound/Debridement Treatment Note &  Discharge Summary  [facility]     Patient Name: Michelle Weaver  : 1965  MRN: [id number]  Today's Date: 2023                  Admit Date: 2023    Visit Dx:    ICD-10-CM ICD-9-CM   1. Open wound of left great toe, subsequent encounter  S91.102D V58.89     893.0       Patient Active Problem List   Diagnosis   • Cellulitis of left leg   • Diabetic ulcer of toe of left foot associated with type 2 diabetes mellitus   • Anxiety   • Panic attacks   • Primary hypertension   • Hyperglycemia   • Type 2 diabetes mellitus with hyperglycemia, without long-term current use of insulin   • Hyperlipidemia   • Cellulitis of great toe of left foot   • Noncompliance with treatment plan        Past Medical History:   Diagnosis Date   • Anxiety 2022   • Panic attacks 2022   • Primary hypertension 2022        Past Surgical History:   Procedure Laterality Date   • BREAST CYST EXCISION Right     approx 2017         EVALUATION   PT Ortho     Row Name 23 1300       Subjective Comments    Subjective Comments No complaints or changes  -       Subjective Pain    Able to rate subjective pain? yes  -MC    Pre-Treatment Pain Level 0  -MC    Post-Treatment Pain Level 0  -MC       Transfers    Comment, (Transfers) remained seated for tx  -          User Key  (r) = Recorded By, (t) = Taken By, (c) = Cosigned By    Initials Name Provider Type    Beth Johnson PT Physical Therapist                     [facility] Wound     Row Name 23 1300             Wound 23 1130 Left medial great toe Diabetic Ulcer    Wound - Properties Group Placement Date: 23  - Placement Time: 1130  -MC Present on Hospital Admission: Y  - Side: Left  - Orientation: medial  - Location: great toe  -MC Primary Wound Type: Diabetic ul  -    Wound Image Images linked: 1  -      Dressing Appearance intact;no drainage  -      Base  pink;closed/resurfaced;dry;epithelialization  -      Periwound intact;dry  -      Periwound Temperature cool  -      Periwound Skin Turgor firm  -      Drainage Amount none  -      Care, Wound cleansed with;wound cleanser;debrided  -      Dressing Care dressing applied;silver impregnated;low-adherent;foam;gauze;coban  mepilex Ag, conform, coban  -      Periwound Care cleansed with pH balanced cleanser;dry periwound area maintained  -      Retired Wound - Properties Group Placement Date: 04/07/23  - Placement Time: 1130  - Present on Hospital Admission: Y  - Side: Left  - Orientation: medial  - Location: great toe  - Primary Wound Type: Diabetic ulc  -    Retired Wound - Properties Group Date first assessed: 04/07/23  - Time first assessed: 1130  - Present on Hospital Admission: Y  - Side: Left  - Location: great toe  -MC Primary Wound Type: Diabetic ulc  -          User Key  (r) = Recorded By, (t) = Taken By, (c) = Cosigned By    Initials Name Provider Type    Beth Johnson, PT Physical Therapist                  WOUND DEBRIDEMENT  Total area of Debridement: 3 cm2  Debridement Site 1  Location- Site 1: L great toe ulcer and periwound  Selective Debridement- Site 1: Wound Surface <20cmsq  Instruments- Site 1: #15, scapel, tweezers  Excised Tissue Description- Site 1: maximum, other (comment) (callus and crust overlying new skin)  Bleeding- Site 1: none             Therapy Education  Education Details: May continue to pad the area if desired, but this is not necessary at this time. The wound is closed. Will need a new referral to resume care if needed. Recommend callus care to prevent excessive buildup.  Given: Bandaging/dressing change, Symptoms/condition management  Program: Progressed  How Provided: Verbal, Demonstration  Provided to: Patient  Level of Understanding: Teach back education performed, Verbalized     Therapy Education     Row Name 04/26/23 1300              Therapy Education    Education Details May continue to pad the area if desired, but this is not necessary at this time. The wound is closed. Will need a new referral to resume care if needed. Recommend callus care to prevent excessive buildup.  -MC      Given Bandaging/dressing change;Symptoms/condition management  -      Program Progressed  -      How Provided Verbal;Demonstration  -MC      Provided to Patient  -      Level of Understanding Teach back education performed;Verbalized  -            User Key  (r) = Recorded By, (t) = Taken By, (c) = Cosigned By    Initials Name Provider Type    Beth Johnson, PT Physical Therapist                Recommendation and Plan   PT Assessment/Plan     Row Name 04/26/23 1300          PT Assessment    Functional Limitations Performance in self-care ADL;Other (comment)  wound mgmt  -     Impairments Integumentary integrity;Impaired sensory integrity  -     Assessment Comments Pt's L great toe wound is closed, with appropriate skin integrity noted after thorough debridement today. Pt has met her PT wound care goals and is appropriate for discharge with instructions for ongoing callus care.  -        PT Plan    PT Plan Comments D/C  -           User Key  (r) = Recorded By, (t) = Taken By, (c) = Cosigned By    Initials Name Provider Type    Beth Johnson, PT Physical Therapist                Goals   PT OP Goals     Row Name 04/26/23 1339 04/26/23 1300       PT Short Term Goals    STG 1 -- Pt to have no s/s of infection to L GT wound.  -    STG 1 Progress -- Met  -    STG 2 -- Pt will demonstrate 25% reduction in wound area to indicate healing progress.  -    STG 2 Progress -- Met  -       Long Term Goals    LTG 1 -- Pt will demonstrate independence with clean home dressing changes.  -    LTG 1 Progress -- Met  -    LTG 2 -- Pt will demonstrate 90% reduction in wound area to indicate healing progress.  -    LTG 2 Progress -- Met  -        Time Calculation    PT Goal Re-Cert Due Date 07/06/23  - --          User Key  (r) = Recorded By, (t) = Taken By, (c) = Cosigned By    Initials Name Provider Type    Beth Johnson, PT Physical Therapist                Time Calculation: Start Time: 1030  Untimed Charges  [id number] debridement: 20  Total Minutes  Untimed Charges Total Minutes: 20   Total Minutes: 20    Therapy Charges for Today     Code Description Service Date Service Provider Modifiers Qty    54664976085 HC SHERLY DEBRIDE OPEN WOUND UP TO 20CM 4/26/2023 Beth Banks, PT GP 1               OP Discharge Summary     Row Name 04/26/23 1338             OP PT Discharge Summary    Date of Discharge 04/26/23  -      Reason for Discharge All goals achieved  -      Outcomes Achieved Able to achieve all goals within established timeline  -      Discharge Destination Home with home program  -            User Key  (r) = Recorded By, (t) = Taken By, (c) = Cosigned By    Initials Name Provider Type    Beth Johnson, PT Physical Therapist                Beth Banks, RENY  4/26/2023

## 2024-07-06 ENCOUNTER — HOSPITAL ENCOUNTER (OUTPATIENT)
Facility: HOSPITAL | Age: 59
Setting detail: OBSERVATION
LOS: 1 days | Discharge: HOME | End: 2024-07-09
Attending: EMERGENCY MEDICINE | Admitting: STUDENT IN AN ORGANIZED HEALTH CARE EDUCATION/TRAINING PROGRAM
Payer: COMMERCIAL

## 2024-07-06 ENCOUNTER — APPOINTMENT (OUTPATIENT)
Dept: GENERAL RADIOLOGY | Facility: HOSPITAL | Age: 59
End: 2024-07-06
Payer: COMMERCIAL

## 2024-07-06 ENCOUNTER — APPOINTMENT (OUTPATIENT)
Dept: CARDIOLOGY | Facility: HOSPITAL | Age: 59
End: 2024-07-06
Payer: COMMERCIAL

## 2024-07-06 DIAGNOSIS — E11.65 TYPE 2 DIABETES MELLITUS WITH HYPERGLYCEMIA, WITHOUT LONG-TERM CURRENT USE OF INSULIN: ICD-10-CM

## 2024-07-06 DIAGNOSIS — R06.02 SHORTNESS OF BREATH: Primary | ICD-10-CM

## 2024-07-06 DIAGNOSIS — I50.21 ACUTE HFREF (HEART FAILURE WITH REDUCED EJECTION FRACTION): ICD-10-CM

## 2024-07-06 DIAGNOSIS — I50.21 ACUTE SYSTOLIC CONGESTIVE HEART FAILURE: ICD-10-CM

## 2024-07-06 DIAGNOSIS — E11.65 UNCONTROLLED TYPE 2 DIABETES MELLITUS WITH HYPERGLYCEMIA: ICD-10-CM

## 2024-07-06 DIAGNOSIS — I10 PRIMARY HYPERTENSION: Chronic | ICD-10-CM

## 2024-07-06 DIAGNOSIS — I10 POORLY-CONTROLLED HYPERTENSION: ICD-10-CM

## 2024-07-06 DIAGNOSIS — R06.09 DYSPNEA ON EXERTION: ICD-10-CM

## 2024-07-06 DIAGNOSIS — I16.0 HYPERTENSIVE URGENCY: ICD-10-CM

## 2024-07-06 DIAGNOSIS — R06.01 ORTHOPNEA: ICD-10-CM

## 2024-07-06 DIAGNOSIS — E87.1 HYPONATREMIA: ICD-10-CM

## 2024-07-06 DIAGNOSIS — I50.9 NEW ONSET OF CONGESTIVE HEART FAILURE: ICD-10-CM

## 2024-07-06 LAB
ALBUMIN SERPL-MCNC: 3.2 G/DL (ref 3.5–5.2)
ALBUMIN/GLOB SERPL: 1 G/DL
ALP SERPL-CCNC: 96 U/L (ref 39–117)
ALT SERPL W P-5'-P-CCNC: 23 U/L (ref 1–33)
ANION GAP SERPL CALCULATED.3IONS-SCNC: 10 MMOL/L (ref 5–15)
AST SERPL-CCNC: 27 U/L (ref 1–32)
B PARAPERT DNA SPEC QL NAA+PROBE: NOT DETECTED
B PERT DNA SPEC QL NAA+PROBE: NOT DETECTED
BASOPHILS # BLD AUTO: 0.04 10*3/MM3 (ref 0–0.2)
BASOPHILS NFR BLD AUTO: 0.5 % (ref 0–1.5)
BH CV ECHO MEAS - AO MAX PG: 3.7 MMHG
BH CV ECHO MEAS - AO MEAN PG: 2 MMHG
BH CV ECHO MEAS - AO ROOT DIAM: 3.4 CM
BH CV ECHO MEAS - AO V2 MAX: 96.2 CM/SEC
BH CV ECHO MEAS - AO V2 VTI: 17 CM
BH CV ECHO MEAS - AVA(I,D): 2.5 CM2
BH CV ECHO MEAS - EDV(CUBED): 140.6 ML
BH CV ECHO MEAS - EDV(MOD-SP2): 149 ML
BH CV ECHO MEAS - EDV(MOD-SP4): 158 ML
BH CV ECHO MEAS - EF(MOD-BP): 29.9 %
BH CV ECHO MEAS - EF(MOD-SP2): 14.1 %
BH CV ECHO MEAS - EF(MOD-SP4): 41.5 %
BH CV ECHO MEAS - ESV(CUBED): 68.9 ML
BH CV ECHO MEAS - ESV(MOD-SP2): 128 ML
BH CV ECHO MEAS - ESV(MOD-SP4): 92.4 ML
BH CV ECHO MEAS - FS: 21.2 %
BH CV ECHO MEAS - IVS/LVPW: 1 CM
BH CV ECHO MEAS - IVSD: 1.1 CM
BH CV ECHO MEAS - LA DIMENSION: 4.8 CM
BH CV ECHO MEAS - LAT PEAK E' VEL: 7.3 CM/SEC
BH CV ECHO MEAS - LV DIASTOLIC VOL/BSA (35-75): 67.9 CM2
BH CV ECHO MEAS - LV MASS(C)D: 220.8 GRAMS
BH CV ECHO MEAS - LV MAX PG: 2.44 MMHG
BH CV ECHO MEAS - LV MEAN PG: 1 MMHG
BH CV ECHO MEAS - LV SYSTOLIC VOL/BSA (12-30): 39.7 CM2
BH CV ECHO MEAS - LV V1 MAX: 78.1 CM/SEC
BH CV ECHO MEAS - LV V1 VTI: 13.7 CM
BH CV ECHO MEAS - LVIDD: 5.2 CM
BH CV ECHO MEAS - LVIDS: 4.1 CM
BH CV ECHO MEAS - LVOT AREA: 3.1 CM2
BH CV ECHO MEAS - LVOT DIAM: 2 CM
BH CV ECHO MEAS - LVPWD: 1.1 CM
BH CV ECHO MEAS - MED PEAK E' VEL: 4.5 CM/SEC
BH CV ECHO MEAS - MV A MAX VEL: 61.8 CM/SEC
BH CV ECHO MEAS - MV DEC TIME: 0.14 SEC
BH CV ECHO MEAS - MV E MAX VEL: 78.1 CM/SEC
BH CV ECHO MEAS - MV E/A: 1.26
BH CV ECHO MEAS - SV(LVOT): 43 ML
BH CV ECHO MEAS - SV(MOD-SP2): 21 ML
BH CV ECHO MEAS - SV(MOD-SP4): 65.6 ML
BH CV ECHO MEAS - SVI(LVOT): 18.5 ML/M2
BH CV ECHO MEAS - SVI(MOD-SP2): 9 ML/M2
BH CV ECHO MEAS - SVI(MOD-SP4): 28.2 ML/M2
BH CV ECHO MEAS - TAPSE (>1.6): 1.93 CM
BH CV ECHO MEASUREMENTS AVERAGE E/E' RATIO: 13.24
BH CV VAS BP LEFT ARM: NORMAL MMHG
BH CV XLRA - RV BASE: 4.1 CM
BH CV XLRA - RV LENGTH: 7.5 CM
BH CV XLRA - RV MID: 3.4 CM
BH CV XLRA - TDI S': 13.2 CM/SEC
BILIRUB SERPL-MCNC: 1.1 MG/DL (ref 0–1.2)
BUN SERPL-MCNC: 8 MG/DL (ref 8–23)
BUN/CREAT SERPL: 8.8 (ref 7–25)
C PNEUM DNA NPH QL NAA+NON-PROBE: NOT DETECTED
CALCIUM SPEC-SCNC: 8.2 MG/DL (ref 8.6–10.5)
CHLORIDE SERPL-SCNC: 90 MMOL/L (ref 98–107)
CHOLEST SERPL-MCNC: 180 MG/DL (ref 0–200)
CO2 SERPL-SCNC: 26 MMOL/L (ref 22–29)
CREAT SERPL-MCNC: 0.91 MG/DL (ref 0.57–1)
DEPRECATED RDW RBC AUTO: 41.7 FL (ref 37–54)
EGFRCR SERPLBLD CKD-EPI 2021: 72.4 ML/MIN/1.73
EOSINOPHIL # BLD AUTO: 0.07 10*3/MM3 (ref 0–0.4)
EOSINOPHIL NFR BLD AUTO: 0.8 % (ref 0.3–6.2)
ERYTHROCYTE [DISTWIDTH] IN BLOOD BY AUTOMATED COUNT: 14.6 % (ref 12.3–15.4)
FERRITIN SERPL-MCNC: 27.73 NG/ML (ref 13–150)
FLUAV SUBTYP SPEC NAA+PROBE: NOT DETECTED
FLUBV RNA ISLT QL NAA+PROBE: NOT DETECTED
FOLATE SERPL-MCNC: 15.4 NG/ML (ref 4.78–24.2)
GLOBULIN UR ELPH-MCNC: 3.3 GM/DL
GLUCOSE BLDC GLUCOMTR-MCNC: 245 MG/DL (ref 70–130)
GLUCOSE BLDC GLUCOMTR-MCNC: 258 MG/DL (ref 70–130)
GLUCOSE BLDC GLUCOMTR-MCNC: 274 MG/DL (ref 70–130)
GLUCOSE BLDC GLUCOMTR-MCNC: 293 MG/DL (ref 70–130)
GLUCOSE SERPL-MCNC: 310 MG/DL (ref 65–99)
HADV DNA SPEC NAA+PROBE: NOT DETECTED
HBA1C MFR BLD: 11.6 % (ref 4.8–5.6)
HCOV 229E RNA SPEC QL NAA+PROBE: NOT DETECTED
HCOV HKU1 RNA SPEC QL NAA+PROBE: NOT DETECTED
HCOV NL63 RNA SPEC QL NAA+PROBE: NOT DETECTED
HCOV OC43 RNA SPEC QL NAA+PROBE: NOT DETECTED
HCT VFR BLD AUTO: 38.2 % (ref 34–46.6)
HDLC SERPL-MCNC: 47 MG/DL (ref 40–60)
HGB BLD-MCNC: 11.1 G/DL (ref 12–15.9)
HMPV RNA NPH QL NAA+NON-PROBE: NOT DETECTED
HOLD SPECIMEN: NORMAL
HPIV1 RNA ISLT QL NAA+PROBE: NOT DETECTED
HPIV2 RNA SPEC QL NAA+PROBE: NOT DETECTED
HPIV3 RNA NPH QL NAA+PROBE: NOT DETECTED
HPIV4 P GENE NPH QL NAA+PROBE: NOT DETECTED
IMM GRANULOCYTES # BLD AUTO: 0.03 10*3/MM3 (ref 0–0.05)
IMM GRANULOCYTES NFR BLD AUTO: 0.4 % (ref 0–0.5)
IRON 24H UR-MRATE: 26 MCG/DL (ref 37–145)
IRON SATN MFR SERPL: 6 % (ref 20–50)
LDLC SERPL CALC-MCNC: 107 MG/DL (ref 0–100)
LDLC/HDLC SERPL: 2.19 {RATIO}
LEFT ATRIUM VOLUME INDEX: 36.1 ML/M2
LIPASE SERPL-CCNC: 17 U/L (ref 13–60)
LV EF 2D ECHO EST: 25 %
LYMPHOCYTES # BLD AUTO: 2.24 10*3/MM3 (ref 0.7–3.1)
LYMPHOCYTES NFR BLD AUTO: 27.2 % (ref 19.6–45.3)
M PNEUMO IGG SER IA-ACNC: NOT DETECTED
MCH RBC QN AUTO: 22.7 PG (ref 26.6–33)
MCHC RBC AUTO-ENTMCNC: 29.1 G/DL (ref 31.5–35.7)
MCV RBC AUTO: 78.3 FL (ref 79–97)
MONOCYTES # BLD AUTO: 0.56 10*3/MM3 (ref 0.1–0.9)
MONOCYTES NFR BLD AUTO: 6.8 % (ref 5–12)
NEUTROPHILS NFR BLD AUTO: 5.3 10*3/MM3 (ref 1.7–7)
NEUTROPHILS NFR BLD AUTO: 64.3 % (ref 42.7–76)
NRBC BLD AUTO-RTO: 0 /100 WBC (ref 0–0.2)
NT-PROBNP SERPL-MCNC: 9791 PG/ML (ref 0–900)
PLATELET # BLD AUTO: 333 10*3/MM3 (ref 140–450)
PMV BLD AUTO: 10.8 FL (ref 6–12)
POTASSIUM SERPL-SCNC: 4.3 MMOL/L (ref 3.5–5.2)
PROT SERPL-MCNC: 6.5 G/DL (ref 6–8.5)
RBC # BLD AUTO: 4.88 10*6/MM3 (ref 3.77–5.28)
RETICS # AUTO: 0.09 10*6/MM3 (ref 0.02–0.13)
RETICS/RBC NFR AUTO: 1.93 % (ref 0.7–1.9)
RHINOVIRUS RNA SPEC NAA+PROBE: NOT DETECTED
RSV RNA NPH QL NAA+NON-PROBE: NOT DETECTED
SARS-COV-2 RNA NPH QL NAA+NON-PROBE: NOT DETECTED
SODIUM SERPL-SCNC: 126 MMOL/L (ref 136–145)
SODIUM SERPL-SCNC: 133 MMOL/L (ref 136–145)
SODIUM SERPL-SCNC: 136 MMOL/L (ref 136–145)
SODIUM SERPL-SCNC: 137 MMOL/L (ref 136–145)
SODIUM SERPL-SCNC: 138 MMOL/L (ref 136–145)
T4 FREE SERPL-MCNC: 1.42 NG/DL (ref 0.92–1.68)
TIBC SERPL-MCNC: 407 MCG/DL (ref 298–536)
TRANSFERRIN SERPL-MCNC: 273 MG/DL (ref 200–360)
TRIGL SERPL-MCNC: 150 MG/DL (ref 0–150)
TROPONIN T SERPL HS-MCNC: 36 NG/L
TROPONIN T SERPL HS-MCNC: 40 NG/L
TSH SERPL DL<=0.05 MIU/L-ACNC: 5.99 UIU/ML (ref 0.27–4.2)
VIT B12 BLD-MCNC: 474 PG/ML (ref 211–946)
VLDLC SERPL-MCNC: 26 MG/DL (ref 5–40)
WBC NRBC COR # BLD AUTO: 8.24 10*3/MM3 (ref 3.4–10.8)
WHOLE BLOOD HOLD COAG: NORMAL
WHOLE BLOOD HOLD SPECIMEN: NORMAL

## 2024-07-06 PROCEDURE — G0378 HOSPITAL OBSERVATION PER HR: HCPCS

## 2024-07-06 PROCEDURE — 82948 REAGENT STRIP/BLOOD GLUCOSE: CPT

## 2024-07-06 PROCEDURE — 84484 ASSAY OF TROPONIN QUANT: CPT | Performed by: EMERGENCY MEDICINE

## 2024-07-06 PROCEDURE — 63710000001 INSULIN GLARGINE PER 5 UNITS: Performed by: NURSE PRACTITIONER

## 2024-07-06 PROCEDURE — 25010000002 FUROSEMIDE PER 20 MG: Performed by: EMERGENCY MEDICINE

## 2024-07-06 PROCEDURE — 99203 OFFICE O/P NEW LOW 30 MIN: CPT | Performed by: INTERNAL MEDICINE

## 2024-07-06 PROCEDURE — 96376 TX/PRO/DX INJ SAME DRUG ADON: CPT

## 2024-07-06 PROCEDURE — 80061 LIPID PANEL: CPT | Performed by: NURSE PRACTITIONER

## 2024-07-06 PROCEDURE — 99285 EMERGENCY DEPT VISIT HI MDM: CPT

## 2024-07-06 PROCEDURE — 84466 ASSAY OF TRANSFERRIN: CPT | Performed by: NURSE PRACTITIONER

## 2024-07-06 PROCEDURE — 82728 ASSAY OF FERRITIN: CPT | Performed by: NURSE PRACTITIONER

## 2024-07-06 PROCEDURE — 25010000002 NITROGLYCERIN 200 MCG/ML SOLUTION: Performed by: NURSE PRACTITIONER

## 2024-07-06 PROCEDURE — 82607 VITAMIN B-12: CPT | Performed by: NURSE PRACTITIONER

## 2024-07-06 PROCEDURE — 99222 1ST HOSP IP/OBS MODERATE 55: CPT | Performed by: FAMILY MEDICINE

## 2024-07-06 PROCEDURE — 93005 ELECTROCARDIOGRAM TRACING: CPT | Performed by: EMERGENCY MEDICINE

## 2024-07-06 PROCEDURE — 93010 ELECTROCARDIOGRAM REPORT: CPT | Performed by: INTERNAL MEDICINE

## 2024-07-06 PROCEDURE — 36415 COLL VENOUS BLD VENIPUNCTURE: CPT

## 2024-07-06 PROCEDURE — 83036 HEMOGLOBIN GLYCOSYLATED A1C: CPT | Performed by: NURSE PRACTITIONER

## 2024-07-06 PROCEDURE — 25010000002 ENOXAPARIN PER 10 MG: Performed by: NURSE PRACTITIONER

## 2024-07-06 PROCEDURE — 71045 X-RAY EXAM CHEST 1 VIEW: CPT

## 2024-07-06 PROCEDURE — 84439 ASSAY OF FREE THYROXINE: CPT | Performed by: NURSE PRACTITIONER

## 2024-07-06 PROCEDURE — 82746 ASSAY OF FOLIC ACID SERUM: CPT | Performed by: NURSE PRACTITIONER

## 2024-07-06 PROCEDURE — 99222 1ST HOSP IP/OBS MODERATE 55: CPT | Performed by: NURSE PRACTITIONER

## 2024-07-06 PROCEDURE — 83880 ASSAY OF NATRIURETIC PEPTIDE: CPT | Performed by: EMERGENCY MEDICINE

## 2024-07-06 PROCEDURE — 83690 ASSAY OF LIPASE: CPT | Performed by: EMERGENCY MEDICINE

## 2024-07-06 PROCEDURE — 85045 AUTOMATED RETICULOCYTE COUNT: CPT | Performed by: NURSE PRACTITIONER

## 2024-07-06 PROCEDURE — 96372 THER/PROPH/DIAG INJ SC/IM: CPT

## 2024-07-06 PROCEDURE — 96365 THER/PROPH/DIAG IV INF INIT: CPT

## 2024-07-06 PROCEDURE — 80050 GENERAL HEALTH PANEL: CPT | Performed by: EMERGENCY MEDICINE

## 2024-07-06 PROCEDURE — 83540 ASSAY OF IRON: CPT | Performed by: NURSE PRACTITIONER

## 2024-07-06 PROCEDURE — 84295 ASSAY OF SERUM SODIUM: CPT | Performed by: NURSE PRACTITIONER

## 2024-07-06 PROCEDURE — 0202U NFCT DS 22 TRGT SARS-COV-2: CPT | Performed by: EMERGENCY MEDICINE

## 2024-07-06 PROCEDURE — 93306 TTE W/DOPPLER COMPLETE: CPT

## 2024-07-06 PROCEDURE — 25010000002 FUROSEMIDE PER 20 MG: Performed by: NURSE PRACTITIONER

## 2024-07-06 PROCEDURE — 93005 ELECTROCARDIOGRAM TRACING: CPT

## 2024-07-06 PROCEDURE — 96366 THER/PROPH/DIAG IV INF ADDON: CPT

## 2024-07-06 PROCEDURE — 63710000001 INSULIN GLARGINE PER 5 UNITS: Performed by: INTERNAL MEDICINE

## 2024-07-06 PROCEDURE — 93306 TTE W/DOPPLER COMPLETE: CPT | Performed by: INTERNAL MEDICINE

## 2024-07-06 PROCEDURE — 63710000001 INSULIN LISPRO (HUMAN) PER 5 UNITS: Performed by: NURSE PRACTITIONER

## 2024-07-06 RX ORDER — NITROGLYCERIN 20 MG/100ML
10-50 INJECTION INTRAVENOUS
Status: DISCONTINUED | OUTPATIENT
Start: 2024-07-06 | End: 2024-07-07

## 2024-07-06 RX ORDER — AMOXICILLIN 250 MG
2 CAPSULE ORAL 2 TIMES DAILY PRN
Status: DISCONTINUED | OUTPATIENT
Start: 2024-07-06 | End: 2024-07-09 | Stop reason: HOSPADM

## 2024-07-06 RX ORDER — LISINOPRIL 20 MG/1
20 TABLET ORAL DAILY
Status: DISCONTINUED | OUTPATIENT
Start: 2024-07-06 | End: 2024-07-06

## 2024-07-06 RX ORDER — ONDANSETRON 4 MG/1
4 TABLET, ORALLY DISINTEGRATING ORAL EVERY 6 HOURS PRN
Status: DISCONTINUED | OUTPATIENT
Start: 2024-07-06 | End: 2024-07-09 | Stop reason: HOSPADM

## 2024-07-06 RX ORDER — SODIUM CHLORIDE 0.9 % (FLUSH) 0.9 %
10 SYRINGE (ML) INJECTION EVERY 12 HOURS SCHEDULED
Status: DISCONTINUED | OUTPATIENT
Start: 2024-07-06 | End: 2024-07-09 | Stop reason: HOSPADM

## 2024-07-06 RX ORDER — SODIUM CHLORIDE 0.9 % (FLUSH) 0.9 %
10 SYRINGE (ML) INJECTION AS NEEDED
Status: DISCONTINUED | OUTPATIENT
Start: 2024-07-06 | End: 2024-07-09 | Stop reason: HOSPADM

## 2024-07-06 RX ORDER — BUMETANIDE 1 MG/1
2 TABLET ORAL
Status: DISCONTINUED | OUTPATIENT
Start: 2024-07-06 | End: 2024-07-08

## 2024-07-06 RX ORDER — ENOXAPARIN SODIUM 100 MG/ML
40 INJECTION SUBCUTANEOUS DAILY
Status: DISCONTINUED | OUTPATIENT
Start: 2024-07-06 | End: 2024-07-08

## 2024-07-06 RX ORDER — IBUPROFEN 600 MG/1
1 TABLET ORAL
Status: DISCONTINUED | OUTPATIENT
Start: 2024-07-06 | End: 2024-07-09 | Stop reason: HOSPADM

## 2024-07-06 RX ORDER — CARVEDILOL 12.5 MG/1
12.5 TABLET ORAL 2 TIMES DAILY
Status: DISCONTINUED | OUTPATIENT
Start: 2024-07-06 | End: 2024-07-09 | Stop reason: HOSPADM

## 2024-07-06 RX ORDER — FUROSEMIDE 10 MG/ML
40 INJECTION INTRAMUSCULAR; INTRAVENOUS DAILY
Status: DISCONTINUED | OUTPATIENT
Start: 2024-07-06 | End: 2024-07-06

## 2024-07-06 RX ORDER — UREA 10 %
5 LOTION (ML) TOPICAL NIGHTLY PRN
Status: DISCONTINUED | OUTPATIENT
Start: 2024-07-06 | End: 2024-07-09 | Stop reason: HOSPADM

## 2024-07-06 RX ORDER — HYDROXYZINE HYDROCHLORIDE 25 MG/1
25 TABLET, FILM COATED ORAL 3 TIMES DAILY PRN
Status: DISCONTINUED | OUTPATIENT
Start: 2024-07-06 | End: 2024-07-09 | Stop reason: HOSPADM

## 2024-07-06 RX ORDER — POLYETHYLENE GLYCOL 3350 17 G/17G
17 POWDER, FOR SOLUTION ORAL DAILY PRN
Status: DISCONTINUED | OUTPATIENT
Start: 2024-07-06 | End: 2024-07-09 | Stop reason: HOSPADM

## 2024-07-06 RX ORDER — FUROSEMIDE 10 MG/ML
80 INJECTION INTRAMUSCULAR; INTRAVENOUS ONCE
Status: COMPLETED | OUTPATIENT
Start: 2024-07-06 | End: 2024-07-06

## 2024-07-06 RX ORDER — DEXTROSE MONOHYDRATE 25 G/50ML
25 INJECTION, SOLUTION INTRAVENOUS
Status: DISCONTINUED | OUTPATIENT
Start: 2024-07-06 | End: 2024-07-09 | Stop reason: HOSPADM

## 2024-07-06 RX ORDER — ISOSORBIDE MONONITRATE 30 MG/1
30 TABLET, EXTENDED RELEASE ORAL
Status: DISCONTINUED | OUTPATIENT
Start: 2024-07-06 | End: 2024-07-09 | Stop reason: HOSPADM

## 2024-07-06 RX ORDER — INSULIN LISPRO 100 [IU]/ML
2-9 INJECTION, SOLUTION INTRAVENOUS; SUBCUTANEOUS
Status: DISCONTINUED | OUTPATIENT
Start: 2024-07-06 | End: 2024-07-09 | Stop reason: HOSPADM

## 2024-07-06 RX ORDER — ACETAMINOPHEN 325 MG/1
650 TABLET ORAL EVERY 4 HOURS PRN
Status: DISCONTINUED | OUTPATIENT
Start: 2024-07-06 | End: 2024-07-08

## 2024-07-06 RX ORDER — ESOMEPRAZOLE MAGNESIUM 40 MG/1
40 CAPSULE, DELAYED RELEASE ORAL
COMMUNITY

## 2024-07-06 RX ORDER — FUROSEMIDE 10 MG/ML
40 INJECTION INTRAMUSCULAR; INTRAVENOUS EVERY 12 HOURS
Status: DISCONTINUED | OUTPATIENT
Start: 2024-07-06 | End: 2024-07-06

## 2024-07-06 RX ORDER — ACETAMINOPHEN 650 MG/1
650 SUPPOSITORY RECTAL EVERY 4 HOURS PRN
Status: DISCONTINUED | OUTPATIENT
Start: 2024-07-06 | End: 2024-07-09 | Stop reason: HOSPADM

## 2024-07-06 RX ORDER — ROSUVASTATIN CALCIUM 10 MG/1
10 TABLET, COATED ORAL NIGHTLY
Status: DISCONTINUED | OUTPATIENT
Start: 2024-07-06 | End: 2024-07-09 | Stop reason: HOSPADM

## 2024-07-06 RX ORDER — NITROGLYCERIN 0.4 MG/1
0.4 TABLET SUBLINGUAL
Status: DISCONTINUED | OUTPATIENT
Start: 2024-07-06 | End: 2024-07-08 | Stop reason: SDUPTHER

## 2024-07-06 RX ORDER — SPIRONOLACTONE 25 MG/1
25 TABLET ORAL DAILY
Status: DISCONTINUED | OUTPATIENT
Start: 2024-07-06 | End: 2024-07-09 | Stop reason: HOSPADM

## 2024-07-06 RX ORDER — SODIUM CHLORIDE 9 MG/ML
40 INJECTION, SOLUTION INTRAVENOUS AS NEEDED
Status: DISCONTINUED | OUTPATIENT
Start: 2024-07-06 | End: 2024-07-09 | Stop reason: HOSPADM

## 2024-07-06 RX ORDER — ACETAMINOPHEN 160 MG/5ML
650 SOLUTION ORAL EVERY 4 HOURS PRN
Status: DISCONTINUED | OUTPATIENT
Start: 2024-07-06 | End: 2024-07-09 | Stop reason: HOSPADM

## 2024-07-06 RX ORDER — PANTOPRAZOLE SODIUM 40 MG/1
40 TABLET, DELAYED RELEASE ORAL
Status: DISCONTINUED | OUTPATIENT
Start: 2024-07-06 | End: 2024-07-09 | Stop reason: HOSPADM

## 2024-07-06 RX ORDER — BISACODYL 5 MG/1
5 TABLET, DELAYED RELEASE ORAL DAILY PRN
Status: DISCONTINUED | OUTPATIENT
Start: 2024-07-06 | End: 2024-07-09 | Stop reason: HOSPADM

## 2024-07-06 RX ORDER — NICOTINE POLACRILEX 4 MG
15 LOZENGE BUCCAL
Status: DISCONTINUED | OUTPATIENT
Start: 2024-07-06 | End: 2024-07-09 | Stop reason: HOSPADM

## 2024-07-06 RX ORDER — BISACODYL 10 MG
10 SUPPOSITORY, RECTAL RECTAL DAILY PRN
Status: DISCONTINUED | OUTPATIENT
Start: 2024-07-06 | End: 2024-07-09 | Stop reason: HOSPADM

## 2024-07-06 RX ORDER — LOSARTAN POTASSIUM 50 MG/1
50 TABLET ORAL
Status: DISCONTINUED | OUTPATIENT
Start: 2024-07-06 | End: 2024-07-09

## 2024-07-06 RX ORDER — ONDANSETRON 2 MG/ML
4 INJECTION INTRAMUSCULAR; INTRAVENOUS EVERY 6 HOURS PRN
Status: DISCONTINUED | OUTPATIENT
Start: 2024-07-06 | End: 2024-07-09 | Stop reason: HOSPADM

## 2024-07-06 RX ADMIN — NITROGLYCERIN 15 MCG/MIN: 20 INJECTION INTRAVENOUS at 07:11

## 2024-07-06 RX ADMIN — CARVEDILOL 12.5 MG: 12.5 TABLET, FILM COATED ORAL at 08:22

## 2024-07-06 RX ADMIN — INSULIN GLARGINE 10 UNITS: 100 INJECTION, SOLUTION SUBCUTANEOUS at 05:40

## 2024-07-06 RX ADMIN — LOSARTAN POTASSIUM 50 MG: 50 TABLET, FILM COATED ORAL at 17:09

## 2024-07-06 RX ADMIN — EMPAGLIFLOZIN 10 MG: 10 TABLET, FILM COATED ORAL at 17:09

## 2024-07-06 RX ADMIN — NITROGLYCERIN 5 MCG/MIN: 20 INJECTION INTRAVENOUS at 06:42

## 2024-07-06 RX ADMIN — BUMETANIDE 2 MG: 1 TABLET ORAL at 17:09

## 2024-07-06 RX ADMIN — Medication 10 ML: at 08:12

## 2024-07-06 RX ADMIN — INSULIN LISPRO 6 UNITS: 100 INJECTION, SOLUTION INTRAVENOUS; SUBCUTANEOUS at 17:11

## 2024-07-06 RX ADMIN — SPIRONOLACTONE 25 MG: 25 TABLET ORAL at 17:10

## 2024-07-06 RX ADMIN — PANTOPRAZOLE SODIUM 40 MG: 40 TABLET, DELAYED RELEASE ORAL at 05:40

## 2024-07-06 RX ADMIN — Medication 10 ML: at 20:46

## 2024-07-06 RX ADMIN — INSULIN LISPRO 4 UNITS: 100 INJECTION, SOLUTION INTRAVENOUS; SUBCUTANEOUS at 20:45

## 2024-07-06 RX ADMIN — INSULIN GLARGINE 10 UNITS: 100 INJECTION, SOLUTION SUBCUTANEOUS at 20:44

## 2024-07-06 RX ADMIN — NITROGLYCERIN 10 MCG/MIN: 20 INJECTION INTRAVENOUS at 06:57

## 2024-07-06 RX ADMIN — INSULIN LISPRO 4 UNITS: 100 INJECTION, SOLUTION INTRAVENOUS; SUBCUTANEOUS at 11:50

## 2024-07-06 RX ADMIN — CARVEDILOL 12.5 MG: 12.5 TABLET, FILM COATED ORAL at 20:45

## 2024-07-06 RX ADMIN — FUROSEMIDE 80 MG: 10 INJECTION, SOLUTION INTRAMUSCULAR; INTRAVENOUS at 02:46

## 2024-07-06 RX ADMIN — INSULIN LISPRO 6 UNITS: 100 INJECTION, SOLUTION INTRAVENOUS; SUBCUTANEOUS at 08:11

## 2024-07-06 RX ADMIN — ENOXAPARIN SODIUM 40 MG: 100 INJECTION SUBCUTANEOUS at 08:11

## 2024-07-06 RX ADMIN — FUROSEMIDE 40 MG: 10 INJECTION, SOLUTION INTRAMUSCULAR; INTRAVENOUS at 08:11

## 2024-07-06 NOTE — PROGRESS NOTES
[facility] Medicine Services  ADMISSION FOLLOW-UP NOTE          Patient admitted after midnight, H&P by my partner performed earlier on today's date reviewed.  Interim findings, labs, and charting also reviewed.        The [facility] Hospital Problem List has been managed and updated to include any new diagnoses:  Active Hospital Problems    Diagnosis  POA    **Acute exacerbation of CHF (congestive heart failure) [I50.9]  Unknown    Hypertensive urgency [I16.0]  Unknown    Hyponatremia [E87.1]  Unknown    CHF (congestive heart failure) [I50.9]  Yes    Noncompliance with treatment plan [Z91.199]  Not Applicable    Hyperlipidemia [E78.5]  Yes    Uncontrolled type 2 diabetes mellitus with hyperglycemia [E11.65]  Yes    Panic attacks [F41.0]  Yes      Resolved Hospital Problems    Diagnosis Date Resolved POA    Primary hypertension [I10] 07/06/2024 Yes     Patient is a 59-year-old admitted with acute CHF exacerbation and mild hyponatremia.    Acute CHF  - had lasix 80 mg IV in ED, symptoms improved  - continue lasix 40 mg IV daily   - cardiology consult, requests Dr. Laughlin  - daily weights  - strict intake and output  - ECHO pending  - troponin flat  - NTG drip to keep SBP < 140,  now off     Hypertensive urgency  - previously on carvedilol and lisinopril, discontinued secondary to hyponatremia  -Resume carvedilol, continue to hold lisinopril  -Add Imdur 30 mg  - NTG drip to keep SBP < 140, now off     Hyponatremia, mild  - Na 126, corrected sodium 129, now improved to 133  - serial sodium checks  - likely r/t uncontrolled DM and CHF  - monitor closely on diuretics  -Patient states she was previously told it was her carvedilol and lisinopril that caused the hyponatremia so both were stopped  -Resume carvedilol but continue to hold lisinopril     Uncontrolled T2DM w/ hyperglycemia  - glucose 310 initially  - check A1c  - A1c 13.50 in 2022, down to 10.6 in 2023  - restart levemir 10 units nightly  -  fsbg achs w/ moderate dose ssi  - DM educator consult     HLD  - lipid panel  - restart statin     Panic attacks  - atarax PRN       Expected Discharge   Expected Discharge Date: 7/7/2024; Expected Discharge Time:      Alice Mcknight MD  07/06/24

## 2024-07-06 NOTE — H&P
[facility]  HISTORY AND PHYSICAL    Patient Name: Michelle Weaver  : 1965  MRN: [id number]  Primary Care Physician: Milad Patterson PA-C  Date of admission: 2024    Subjective   Subjective     Chief Complaint:  Shortness of breath, swelling    HPI:  Michelle Weaver is a 59 y.o. female with PMHx of GERD, HTN, HLD, T2DM (dx ), LLE cellulitis / L toe ulcer (f/w LIDC, cx + group B strep and citrobacter, 2022) who presents to the ED for evaluation of shortness of breath and swelling BLE worsening over the past few days. She is unable to lie flat and has increased shortness of breath with exertion. No fever or chills. Has a dry cough which has been chronic and is somewhat improved with taking Nexium. She has not been taking any other medications. Previously on carvedilol, lisinopril, metformin, statin and insulin.  Denies history of heart failure. No prior ECHO. BP on arrival 207/124, tachycardic w/ . She was given 80 mg IV lasix in the ED and reports her breathing feels better. She has not required oxygen.    Chest xray tonight w/ new bibasilar airspace disease, L>R. Pertinent labs: HS troponin 40 w/ reflex 36; proBNP 9791.0; sodium 126 (corrected sodium 129), glucose 310; Hgb 11.1 (previously 14.1 3/29/23). She will be admitted to the hospital medicine service for further evaluation and treatment.    Review of Systems   Constitutional:  Negative for chills and fever.   Respiratory:  Positive for cough and shortness of breath. Negative for wheezing.    Cardiovascular:  Positive for leg swelling. Negative for chest pain and palpitations.   All other systems reviewed and are negative.         Personal History     Past Medical History:   Diagnosis Date    Anxiety 2022    Panic attacks 2022    Primary hypertension 2022             Past Surgical History:   Procedure Laterality Date    BREAST CYST EXCISION Right     approx 2017       Family  History:  family history includes Cancer in her mother; No Known Problems in her daughter, daughter, daughter, and daughter.     Social History:  reports that she has never smoked. She has never used smokeless tobacco. She reports that she does not drink alcohol and does not use drugs.  Social History     Social History Narrative    Not on file       Medications:  Contour Next One, Insulin Glargine (2 Unit Dial), Microlet Lancets, Pen Needles, carvedilol, esomeprazole, glucose blood, lisinopril, metFORMIN ER, and rosuvastatin    Allergies   Allergen Reactions    Sulfa Antibiotics Rash       Objective   Objective     Vital Signs:   Temp:  [98.4 °F (36.9 °C)] 98.4 °F (36.9 °C)  Heart Rate:  [] 95  Resp:  [19] 19  BP: (159-207)/() 162/98    Physical Exam  Constitutional:       General: She is not in acute distress.     Appearance: She is well-developed.   HENT:      Head: Normocephalic and atraumatic.      Nose: Nose normal.      Mouth/Throat:      Pharynx: Oropharynx is clear.   Eyes:      Extraocular Movements: Extraocular movements intact.      Conjunctiva/sclera: Conjunctivae normal.      Pupils: Pupils are equal, round, and reactive to light.   Cardiovascular:      Rate and Rhythm: Normal rate and regular rhythm.      Pulses: Normal pulses.      Heart sounds: Normal heart sounds.   Pulmonary:      Effort: Pulmonary effort is normal.      Breath sounds: Rales (posteror bases) present.   Abdominal:      General: Bowel sounds are normal. There is no distension.      Palpations: Abdomen is soft.      Tenderness: There is no abdominal tenderness.   Musculoskeletal:         General: Normal range of motion.      Cervical back: Normal range of motion and neck supple.      Right lower le+ Pitting Edema present.      Left lower le+ Pitting Edema present.   Skin:     General: Skin is warm and dry.      Capillary Refill: Capillary refill takes less than 2 seconds.      Findings: No rash.   Neurological:       Mental Status: She is alert and oriented to person, place, and time.      Cranial Nerves: No cranial nerve deficit.   Psychiatric:         Mood and Affect: Mood normal.         Behavior: Behavior normal.          Result Review:  I have personally reviewed the results from the time of this admission to 7/6/2024 05:19 EDT and agree with these findings:  [x]  Laboratory list / accordion  [x]  Microbiology  [x]  Radiology  [x]  EKG/Telemetry   []  Cardiology/Vascular   []  Pathology  []  Old records  []  Other:  Most notable findings include:     LAB RESULTS:      Lab 07/06/24  0055   WBC 8.24   HEMOGLOBIN 11.1*   HEMATOCRIT 38.2   PLATELETS 333   NEUTROS ABS 5.30   IMMATURE GRANS (ABS) 0.03   LYMPHS ABS 2.24   MONOS ABS 0.56   EOS ABS 0.07   MCV 78.3*         Lab 07/06/24  0055   SODIUM 126*   POTASSIUM 4.3   CHLORIDE 90*   CO2 26.0   ANION GAP 10.0   BUN 8   CREATININE 0.91   EGFR 72.4   GLUCOSE 310*   CALCIUM 8.2*         Lab 07/06/24  0055   TOTAL PROTEIN 6.5   ALBUMIN 3.2*   GLOBULIN 3.3   ALT (SGPT) 23   AST (SGOT) 27   BILIRUBIN 1.1   ALK PHOS 96   LIPASE 17         Lab 07/06/24  0346 07/06/24  0055   PROBNP  --  9,791.0*   HSTROP T 36* 40*                 Brief Urine Lab Results       None          Microbiology Results (last 10 days)       Procedure Component Value - Date/Time    COVID PRE-OP / PRE-PROCEDURE SCREENING ORDER (NO ISOLATION) - Swab, Nasopharynx [[id number]]  (Normal) Collected: 07/06/24 0052    Lab Status: Final result Specimen: Swab from Nasopharynx Updated: 07/06/24 0152    Narrative:      The following orders were created for panel order COVID PRE-OP / PRE-PROCEDURE SCREENING ORDER (NO ISOLATION) - Swab, Nasopharynx.  Procedure                               Abnormality         Status                     ---------                               -----------         ------                     Respiratory Panel PCR w/...[[id number]]  Normal              Final result                 Please view  results for these tests on the individual orders.    Respiratory Panel PCR w/COVID-19(SARS-CoV-2) MARIA E/[facility]/TELLO/PAD/COR/NUVIA In-House, NP Swab in UTM/VTM, 2 HR TAT - Swab, Nasopharynx [[id number]]  (Normal) Collected: 07/06/24 0052    Lab Status: Final result Specimen: Swab from Nasopharynx Updated: 07/06/24 0152     ADENOVIRUS, PCR Not Detected     Coronavirus 229E Not Detected     Coronavirus HKU1 Not Detected     Coronavirus NL63 Not Detected     Coronavirus OC43 Not Detected     COVID19 Not Detected     Human Metapneumovirus Not Detected     Human Rhinovirus/Enterovirus Not Detected     Influenza A PCR Not Detected     Influenza B PCR Not Detected     Parainfluenza Virus 1 Not Detected     Parainfluenza Virus 2 Not Detected     Parainfluenza Virus 3 Not Detected     Parainfluenza Virus 4 Not Detected     RSV, PCR Not Detected     Bordetella pertussis pcr Not Detected     Bordetella parapertussis PCR Not Detected     Chlamydophila pneumoniae PCR Not Detected     Mycoplasma pneumo by PCR Not Detected    Narrative:      In the setting of a positive respiratory panel with a viral infection PLUS a negative procalcitonin without other underlying concern for bacterial infection, consider observing off antibiotics or discontinuation of antibiotics and continue supportive care. If the respiratory panel is positive for atypical bacterial infection (Bordetella pertussis, Chlamydophila pneumoniae, or Mycoplasma pneumoniae), consider antibiotic de-escalation to target atypical bacterial infection.            XR Chest 1 View    Result Date: 7/6/2024  XR CHEST 1 VW Date of Exam: 7/6/2024 12:40 AM EDT Indication: SOA triage protocol Comparison: 6/4/2018. Findings: Limited lordotic view. There appear to be new bibasilar patchy airspace opacities, left greater than right. Cardiac silhouette is enlarged, which could be artifact. Pulmonary vasculature is unremarkable. No pneumothorax or definite pleural effusion.     Impression:  Impression: Limited lordotic view with new bibasilar airspace disease, left greater than right. Electronically Signed: Leonides Vasquez MD  7/6/2024 1:02 AM EDT  Workstation ID: [id number]         Assessment & Plan   Assessment & Plan       Acute exacerbation of CHF (congestive heart failure)    Panic attacks    Uncontrolled type 2 diabetes mellitus with hyperglycemia    Hyperlipidemia    Noncompliance with treatment plan    Hypertensive urgency    Hyponatremia    Acute CHF  - had lasix 80 mg IV in ED, feels better  - continue lasix 40 mg IV daily   - cardiology consult, requests Dr. Laughlin  - daily weights  - strict intake and output  - ECHO in am  - troponin flat  - NTG drip to keep SBP < 140    Hypertensive urgency  - previously on carvedilol and lisinopril, restart   - NTG drip to keep SBP < 140    Hyponatremia, mild  - Na 126, corrected sodium 129  - serial sodium checks  - likely r/t uncontrolled DM and CHF  - monitor closely on diuretics    Uncontrolled T2DM w/ hyperglycemia  - glucose 310  - check A1c  - A1c 13.50 in 2022, down to 10.6 in 2023  - restart levemir 10 units nightly  - fsbg achs w/ moderate dose ssi  - DM educator consult    HLD  - lipid panel  - restart statin    Panic attacks  - atarax PRN    DVT prophylaxis:  Lovenox    CODE STATUS:    Code Status (Patient has no pulse and is not breathing): CPR (Attempt to Resuscitate)  Medical Interventions (Patient has pulse or is breathing): Full Support      Expected Discharge    Expected Discharge Date: 7/7/2024; Expected Discharge Time:     Signature: Electronically signed by DARLENE Davis, 07/06/24, 5:19 AM EDT    Total time spent: 60 minutes  Time spent includes time reviewing chart, face-to-face time, counseling patient/family/caregiver, ordering medications/tests/procedures, communicating with other health care professionals, documenting clinical information in the electronic health record, and coordination of care.

## 2024-07-06 NOTE — Clinical Note
Level of Care: Med/Surg [1]   Diagnosis: CHF (congestive heart failure) [197293]   Admitting Physician: LAQUITA DASH [[id number]]   Attending Physician: LAQUITA GARCÍA [[id number]]   Certification: I Certify That Inpatient Hospital Services Are Medically Necessary For Greater Than 2 Midnights

## 2024-07-06 NOTE — ED NOTES
Michelle Weaver    Nursing Report ED to Floor:  Mental status: GCS 15  Ambulatory status: UP@[facility]  Oxygen Therapy:  RA  Cardiac Rhythm: NSR  Admitted from: HOME  Safety Concerns:  NONE  Social Issues: NONE  ED Room #:  11    ED Nurse Phone Extension - [phone] or may call 2023.      HPI:   Chief Complaint   Patient presents with    Shortness of Breath    Abdominal Pain       Past Medical History:  Past Medical History:   Diagnosis Date    Anxiety 9/5/2022    Panic attacks 9/5/2022    Primary hypertension 9/5/2022        Past Surgical History:  Past Surgical History:   Procedure Laterality Date    BREAST CYST EXCISION Right     approx 2017        Admitting Doctor:   Andrew Quiles DO    Consulting Provider(s):  Consults       No orders found from 6/7/2024 to 7/7/2024.             Admitting Diagnosis:   The primary encounter diagnosis was Shortness of breath. Diagnoses of New onset of congestive heart failure, Hyponatremia, Orthopnea, Dyspnea on exertion, and Poorly-controlled hypertension were also pertinent to this visit.    Most Recent Vitals:   Vitals:    07/06/24 0159 07/06/24 0230 07/06/24 0301 07/06/24 0400   BP: (!) 163/101 162/98  155/96   BP Location:       Patient Position:       Pulse: 93 99 95 95   Resp:       Temp:       TempSrc:       SpO2: 94% 96%  96%   Weight:       Height:           Active LDAs/IV Access:   Lines, Drains & Airways       Active LDAs       Name Placement date Placement time Site Days    Peripheral IV 07/06/24 0057 Left Antecubital 07/06/24  0057  Antecubital  less than 1                    Labs (abnormal labs have a star):   Labs Reviewed   COMPREHENSIVE METABOLIC PANEL - Abnormal; Notable for the following components:       Result Value    Glucose 310 (*)     Sodium 126 (*)     Chloride 90 (*)     Calcium 8.2 (*)     Albumin 3.2 (*)     All other components within normal limits    Narrative:     GFR Normal >60  Chronic Kidney Disease <60  Kidney Failure <15     BNP (IN-HOUSE) -  Abnormal; Notable for the following components:    proBNP 9,791.0 (*)     All other components within normal limits    Narrative:     This assay is used as an aid in the diagnosis of individuals suspected of having heart failure. It can be used as an aid in the diagnosis of acute decompensated heart failure (ADHF) in patients presenting with signs and symptoms of ADHF to the emergency department (ED). In addition, NT-proBNP of <300 pg/mL indicates ADHF is not likely.    Age Range Result Interpretation  NT-proBNP Concentration (pg/mL:      <50             Positive            >450                   Gray                 300-450                    Negative             <300    50-75           Positive            >900                  Gray                300-900                  Negative            <300      >75             Positive            >1800                  Gray                300-1800                  Negative            <300   SINGLE HS TROPONIN T - Abnormal; Notable for the following components:    HS Troponin T 40 (*)     All other components within normal limits    Narrative:     High Sensitive Troponin T Reference Range:  <14.0 ng/L- Negative Female for AMI  <22.0 ng/L- Negative Male for AMI  >=14 - Abnormal Female indicating possible myocardial injury.  >=22 - Abnormal Male indicating possible myocardial injury.   Clinicians would have to utilize clinical acumen, EKG, Troponin, and serial changes to determine if it is an Acute Myocardial Infarction or myocardial injury due to an underlying chronic condition.        CBC WITH AUTO DIFFERENTIAL - Abnormal; Notable for the following components:    Hemoglobin 11.1 (*)     MCV 78.3 (*)     MCH 22.7 (*)     MCHC 29.1 (*)     All other components within normal limits   SINGLE HS TROPONIN T - Abnormal; Notable for the following components:    HS Troponin T 36 (*)     All other components within normal limits    Narrative:     High Sensitive Troponin T Reference  Range:  <14.0 ng/L- Negative Female for AMI  <22.0 ng/L- Negative Male for AMI  >=14 - Abnormal Female indicating possible myocardial injury.  >=22 - Abnormal Male indicating possible myocardial injury.   Clinicians would have to utilize clinical acumen, EKG, Troponin, and serial changes to determine if it is an Acute Myocardial Infarction or myocardial injury due to an underlying chronic condition.        IRON PROFILE - Abnormal; Notable for the following components:    Iron 26 (*)     Iron Saturation (TSAT) 6 (*)     All other components within normal limits   RESPIRATORY PANEL PCR W/ COVID-19 (SARS-COV-2), NP SWAB IN UTM/VTP, 2 HR TAT - Normal    Narrative:     In the setting of a positive respiratory panel with a viral infection PLUS a negative procalcitonin without other underlying concern for bacterial infection, consider observing off antibiotics or discontinuation of antibiotics and continue supportive care. If the respiratory panel is positive for atypical bacterial infection (Bordetella pertussis, Chlamydophila pneumoniae, or Mycoplasma pneumoniae), consider antibiotic de-escalation to target atypical bacterial infection.   LIPASE - Normal   FERRITIN - Normal    Narrative:     Results may be falsely decreased if patient taking Biotin.     COVID PRE-OP / PRE-PROCEDURE SCREENING ORDER (NO ISOLATION)    Narrative:     The following orders were created for panel order COVID PRE-OP / PRE-PROCEDURE SCREENING ORDER (NO ISOLATION) - Swab, Nasopharynx.  Procedure                               Abnormality         Status                     ---------                               -----------         ------                     Respiratory Panel PCR w/...[[id number]]  Normal              Final result                 Please view results for these tests on the individual orders.   RAINBOW DRAW    Narrative:     The following orders were created for panel order [place] Draw.  Procedure                                Abnormality         Status                     ---------                               -----------         ------                     Green Top (Gel)[[id number]]                                  Final result               Lavender Top[[id number]]                                     Final result               Gold Top - SST[[id number]]                                   Final result               Enciso Top[[id number]]                                         Final result               Light Blue Top[[id number]]                                   Final result                 Please view results for these tests on the individual orders.   VITAMIN B12   FOLATE   RETICULOCYTES   CBC AND DIFFERENTIAL    Narrative:     The following orders were created for panel order CBC & Differential.  Procedure                               Abnormality         Status                     ---------                               -----------         ------                     CBC Auto Differential[[id number]]        Abnormal            Final result                 Please view results for these tests on the individual orders.   GREEN TOP   LAVENDER TOP   GOLD TOP - SST   GRAY TOP   LIGHT BLUE TOP       Meds Given in ED:   Medications   sodium chloride 0.9 % flush 10 mL (has no administration in time range)   carvedilol (COREG) tablet 12.5 mg (has no administration in time range)   insulin glargine (LANTUS, SEMGLEE) injection 10 Units (has no administration in time range)   lisinopril (PRINIVIL,ZESTRIL) tablet 20 mg (has no administration in time range)   rosuvastatin (CRESTOR) tablet 10 mg (has no administration in time range)   pantoprazole (PROTONIX) EC tablet 40 mg (has no administration in time range)   furosemide (LASIX) injection 80 mg (80 mg Intravenous Given 7/6/24 3876)           Last NIH score:                                                          Dysphagia screening results:        [place] Coma Scale:  No data recorded     CIWA:         Restraint Type:            Isolation Status:  No active isolations

## 2024-07-06 NOTE — CONSULTS
[facility]   [address], [address]  [place], [place], [address] [phone]  [facility]           INPATIENT CONSULTATION NOTE    Patient Care Team:  Patient Care Team:  Milad Patterson PA-C as PCP - General (Physician Assistant)    Requesting Provider and Reason for consultation: The patient is being seen today at the request of Dr. Mcknight for congestive heart failure.     Chief complaint:   Chief Complaint   Patient presents with    Shortness of Breath    Abdominal Pain            Subjective:     Cardiac focused problem list:  Dyspnea on exertion, orthopnea, elevated proBNP  ED presentation at [facility], 7/06/2024 for progressive dyspnea, edema, orthopnea   Hypertension   Hyperlipidemia   Type 2 diabetes mellitus   GERD  Left LE cellulitis/left toe ulcer  Follows with [facility]  Cultures positive for group B strep and citrobactor, 9/2022    HPI:      Michelle Weaver is a 59 y.o. 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  Patient presented to [facility] ED with complaints of worsening shortness of breath, orthopnea and bilateral lower extremity edema over the past few days.  Patient also reports a dry cough that has been somewhat chronic.  No prior history of heart failure.  No prior echocardiogram.  Patient was hypertensive on arrival with /124.  She received IV Lasix 80 mg in the ED.  Chest x-ray with new bibasilar airspace disease, left greater than right,HS troponin 40 and 36 respectively, proBNP 9791, sodium 126, glucose 310.      Patient admitted to hospitalist service and cardiology consulted for heart failure.  Today she reports significant improvement in her dyspnea.  Was able to sleep for a while overnight. Denies chest pain, palpitations, lightheadedness or shortness of breath at rest.     Review of Systems:  As noted in the HPI    PFSH:  Patient Active Problem List   Diagnosis    Cellulitis of left leg    Diabetic ulcer of toe of left foot  associated with type 2 diabetes mellitus    Anxiety    Panic attacks    Hyperglycemia    Uncontrolled type 2 diabetes mellitus with hyperglycemia    Hyperlipidemia    Cellulitis of great toe of left foot    Noncompliance with treatment plan    Acute exacerbation of CHF (congestive heart failure)    Hypertensive urgency    Hyponatremia    CHF (congestive heart failure)       No current facility-administered medications on file prior to encounter.     Current Outpatient Medications on File Prior to Encounter   Medication Sig Dispense Refill    esomeprazole (nexIUM) 40 MG capsule Take 1 capsule by mouth Every Morning Before Breakfast.      Blood Glucose Monitoring Suppl (Contour Next One) device Use twice daily as directed to check blood sugar. 1 each 0    carvedilol (COREG) 12.5 MG tablet Take 1 tablet by mouth 2 (Two) Times a Day. 180 tablet 0    glucose blood (FREESTYLE LITE) test strip Use as directed to check blood sugar twice daily 200 each 3    Insulin Glargine, 2 Unit Dial, (Toujeo Max SoloStar) 300 UNIT/ML solution pen-injector injection Inject 10 Units under the skin into the appropriate area as directed Every Night. 6 mL 0    Insulin Pen Needle (Pen Needles) 31G X 5 MM misc 1 each Daily. Use to inject insulin once daily as directed 100 each 1    lisinopril (PRINIVIL,ZESTRIL) 20 MG tablet Take 1 tablet by mouth Daily. 90 tablet 0    metFORMIN ER (GLUCOPHAGE-XR) 500 MG 24 hr tablet Take 1 tablet by mouth daily for 7 days then take 1 tablet by mouth twice daily 180 tablet 0    Microlet Lancets misc Use as directed to check blood sugar twice daily 200 each 3    rosuvastatin (Crestor) 10 MG tablet Take 1 tablet by mouth Every Night. 90 tablet 0       Social History     Socioeconomic History    Marital status:    Tobacco Use    Smoking status: Never    Smokeless tobacco: Never   Vaping Use    Vaping status: Never Used   Substance and Sexual Activity    Alcohol use: No    Drug use: No    Sexual activity:  Defer     Comment: .  Lives with             Objective:     Vital Sign Min/Max for last 24 hours  Temp  Min: 98.4 °F (36.9 °C)  Max: 98.7 °F (37.1 °C)   BP  Min: 119/78  Max: 207/124   Pulse  Min: 81  Max: 103   Resp  Min: 17  Max: 19   SpO2  Min: 88 %  Max: 98 %   No data recorded      Intake/Output Summary (Last 24 hours) at 7/6/2024 1203  Last data filed at 7/6/2024 0800  Gross per 24 hour   Intake 119 ml   Output 900 ml   Net -781 ml           Vitals:    07/06/24 1130   BP: 139/81   Pulse: 82   Resp:    Temp:    SpO2: 93%     CONSTITUTIONAL: No acute distress  RESPIRATORY: Normal effort. Clear to auscultation bilaterally without wheezing or rales  CARDIOVASCULAR: Regular rate and rhythm with normal S1 and S2. Without murmur.  PERIPHERAL VASCULAR: No carotid bruit bilaterally.  Normal radial pulse. There is mild lower extremity edema bilaterally.    Labs and radiologic results:  Today's results were reviewed by myself.    Cardiac Data:    EKG 7/06/2024:  Sinus tachycardia with possible left atrial enlargement         Tele:  NSR         Assessment and Plan:     Problem list:    Acute exacerbation of CHF (congestive heart failure)    Panic attacks    Uncontrolled type 2 diabetes mellitus with hyperglycemia    Hyperlipidemia    Noncompliance with treatment plan    Hypertensive urgency    Hyponatremia    CHF (congestive heart failure)      ASSESSMENT:  Acute congestive heart failure   Elevated proBNP of 9791, CTA chest with interstitial edema.   Echocardiogram pending   Hypertensive urgency  Hyperlipidemia  Type 2 diabetes mellitus  Hyponatremia    PLAN:  Elevated proBNP of 9791, CTA chest with insterstitial edema. Troponin minimally elevated and flat.   Continue diuresis with Lasix 40 mg IV twice a day.   Monitor renal function and electrolytes closely   Echocardiogram pending.   Continue carvedilol, Imdur.  Lisinopril on hold due to hyponatremia  Further optimization of GDMT for HF after echo results.      Electronically signed by DARLENE Gómez, 07/06/24, 12:03 PM EDT.      STAFF CARDIOLOGIST:      SUMMARY:    59 years old with history of hypertension and diabetes admitted with 1 month history of dyspnea and lower extremity edema.      PERTINENT:    EF 25 to 30%  BNP around 10,000  Hemoglobin A1c around 12  Iron saturation around 6%      IMPRESSION:    Cardiomyopathy, source unknown  Acute systolic heart failure  Uncontrolled diabetes  Uncontrolled blood pressure  Possible sleep apnea      RECOMMENDATIONS:    We will switch Lasix to Bumex 2 mg twice daily  We will stop lisinopril and start her on losartan  We will start her on Jardiance  We will start her on spironolactone  Will give iron infusion  Once her blood pressure is under control we will wean off nitroglycerin  She will need a heart cath on Monday to rule out underlying coronary artery disease        I have seen and examined the patient, reviewed the above note, necessary changes were made and I agree with the final note.   Geremias Laughlin MD

## 2024-07-06 NOTE — ED PROVIDER NOTES
Subjective   History of Present Illness  59-year-old female with history of hypertension and diabetes presents to the emergency department accompanied by her  with concerns about shortness of breath worsening over the past few days, with associated bilateral lower extremity swelling and abdominal distention.  She denies recent abdominal pain.  She reports an intermittent cough for the past couple of years, but denies significant increased cough recently.  She denies recent fever.  She denies any known history of heart failure.  She has a history of hypertension but states that it is usually well-controlled.  She denies recent chest pain or palpitations.  She reports orthopnea and dyspnea on exertion which is new over the past few days.      Review of Systems   Constitutional:  Negative for diaphoresis and fever.   HENT:  Negative for nosebleeds and voice change.    Eyes:  Negative for photophobia and discharge.   Respiratory:  Positive for shortness of breath. Negative for stridor.    Cardiovascular:  Positive for leg swelling. Negative for chest pain.   Gastrointestinal:  Positive for abdominal distention.        The patient reports recent alternating constipation and diarrhea, and reports her daughter recently had a diarrheal illness about a month ago.    Neurological:  Positive for weakness (generalized.). Negative for facial asymmetry and speech difficulty.       Past Medical History:   Diagnosis Date    Anxiety 9/5/2022    Panic attacks 9/5/2022    Primary hypertension 9/5/2022   Diabetes mellitus    Allergies   Allergen Reactions    Sulfa Antibiotics Rash       Past Surgical History:   Procedure Laterality Date    BREAST CYST EXCISION Right     approx 2017       Family History   Problem Relation Age of Onset    Cancer Mother     No Known Problems Daughter     No Known Problems Daughter     No Known Problems Daughter     No Known Problems Daughter        Social History     Socioeconomic History     Marital status:    Tobacco Use    Smoking status: Never    Smokeless tobacco: Never   Vaping Use    Vaping status: Never Used   Substance and Sexual Activity    Alcohol use: No    Drug use: No    Sexual activity: Defer     Comment: .  Lives with      Employed, works at our hospital in cardiovascular      Objective   Physical Exam  Vitals and nursing note reviewed.   Constitutional:       General: She is not in acute distress.     Appearance: She is not diaphoretic.      Comments: BMI 37   HENT:      Mouth/Throat:      Comments: Moist mucosa without pallor.  Eyes:      Comments: No photophobia or discharge.   Neck:      Trachea: No tracheal deviation.   Cardiovascular:      Rate and Rhythm: Normal rate and regular rhythm.      Heart sounds: No murmur heard.     No friction rub. No gallop.      Comments: S1, S2, not tachycardic on my exam.  Pulmonary:      Effort: No accessory muscle usage.      Breath sounds: No stridor.      Comments: Conversational dyspnea, mild tachypnea. Air entry is fair. No wheezing or prolonged expiratory phase appreciated. Bibasilar inspiratory rales.   Abdominal:      Palpations: Abdomen is soft.      Tenderness: There is no abdominal tenderness.      Comments: Mild distention or protuberance.   Musculoskeletal:      Cervical back: Neck supple.      Comments: 1+ pitting edema bilateral lower extremities, symmetric.    Skin:     General: Skin is warm and dry.   Neurological:      Mental Status: She is alert.      Comments: Normal speech, no dysarthria. No facial droop.          Procedures           ED Course  ED Course as of 07/10/24 2256   Sat Jul 06, 2024   0233 proBNP(!): 9,791.0 [LD]   0233 HS Troponin T(!): 40 [LD]   0233 Glucose(!): 310 [LD]   0233 Sodium(!): 126 [LD]   0233 Chloride(!): 90 [LD]   0234 WBC: 8.24 [LD]   0234 Creatinine: 0.91 [LD]   0351 Results and plan discussed with patient and her  at bedside.  All questions addressed. [LD]   0408 2nd trop  has been sent, result pending.   [LD]      ED Course User Index  [LD] Marcella Cardenas MD                                             Medical Decision Making  Differential diagnosis includes new onset heart failure, acute coronary syndrome/anginal equivalent, symptomatic anemia, renal failure, liver failure, volume overload, poorly controlled hypertension, hypertensive urgency, hypertensive emergency, pneumonia, pleural effusion, spontaneous pneumothorax, viral illness,  electrolyte abnormality, and others.    Problems Addressed:  Dyspnea on exertion: complicated acute illness or injury  Hyponatremia: complicated acute illness or injury  New onset of congestive heart failure: complicated acute illness or injury  Orthopnea: complicated acute illness or injury  Poorly-controlled hypertension: complicated acute illness or injury  Shortness of breath: complicated acute illness or injury    Amount and/or Complexity of Data Reviewed  External Data Reviewed: labs.     Details: Prior sodium values reviewed, was 137 approximately one year ago.   Labs: ordered. Decision-making details documented in ED Course.  Radiology: ordered. Decision-making details documented in ED Course.  ECG/medicine tests: ordered and independent interpretation performed. Decision-making details documented in ED Course.     Details: I personally interpreted the patient's EKG in real time in the emergency department, EKG at 0026 shows sinus tachycardia at a rate of 102 beats per minute, normal intervals, low voltage QRS, nonspecific ST/T wave changes.  Discussion of management or test interpretation with external provider(s): Hospitalist Dr. Quiles contacted by epic chat at approximately [phone].    Risk  Prescription drug management.  Decision regarding hospitalization.      Recent Results (from the past 24 hour(s))   ECG 12 Lead ED Triage Standing Order; SOA    Collection Time: 07/06/24 12:26 AM   Result Value Ref Range    QT Interval 354 ms    QTC  Interval 461 ms   Respiratory Panel PCR w/COVID-19(SARS-CoV-2) MARIA E/MIGUEL ANGEL/TELLO/[facility] In-House, NP Swab in UTM/VTM, 2 HR TAT - Swab, Nasopharynx    Collection Time: 07/06/24 12:52 AM    Specimen: Nasopharynx; Swab   Result Value Ref Range    ADENOVIRUS, PCR Not Detected Not Detected    Coronavirus 229E Not Detected Not Detected    Coronavirus HKU1 Not Detected Not Detected    Coronavirus NL63 Not Detected Not Detected    Coronavirus OC43 Not Detected Not Detected    COVID19 Not Detected Not Detected - Ref. Range    Human Metapneumovirus Not Detected Not Detected    Human Rhinovirus/Enterovirus Not Detected Not Detected    Influenza A PCR Not Detected Not Detected    Influenza B PCR Not Detected Not Detected    Parainfluenza Virus 1 Not Detected Not Detected    Parainfluenza Virus 2 Not Detected Not Detected    Parainfluenza Virus 3 Not Detected Not Detected    Parainfluenza Virus 4 Not Detected Not Detected    RSV, PCR Not Detected Not Detected    Bordetella pertussis pcr Not Detected Not Detected    Bordetella parapertussis PCR Not Detected Not Detected    Chlamydophila pneumoniae PCR Not Detected Not Detected    Mycoplasma pneumo by PCR Not Detected Not Detected   Comprehensive Metabolic Panel    Collection Time: 07/06/24 12:55 AM    Specimen: Blood   Result Value Ref Range    Glucose 310 (H) 65 - 99 mg/dL    BUN 8 8 - 23 mg/dL    Creatinine 0.91 0.57 - 1.00 mg/dL    Sodium 126 (L) 136 - 145 mmol/L    Potassium 4.3 3.5 - 5.2 mmol/L    Chloride 90 (L) 98 - 107 mmol/L    CO2 26.0 22.0 - 29.0 mmol/L    Calcium 8.2 (L) 8.6 - 10.5 mg/dL    Total Protein 6.5 6.0 - 8.5 g/dL    Albumin 3.2 (L) 3.5 - 5.2 g/dL    ALT (SGPT) 23 1 - 33 U/L    AST (SGOT) 27 1 - 32 U/L    Alkaline Phosphatase 96 39 - 117 U/L    Total Bilirubin 1.1 0.0 - 1.2 mg/dL    Globulin 3.3 gm/dL    A/G Ratio 1.0 g/dL    BUN/Creatinine Ratio 8.8 7.0 - 25.0    Anion Gap 10.0 5.0 - 15.0 mmol/L    eGFR 72.4 >60.0 mL/min/1.73   BNP    Collection Time:  07/06/24 12:55 AM    Specimen: Blood   Result Value Ref Range    proBNP 9,791.0 (H) 0.0 - 900.0 pg/mL   Single High Sensitivity Troponin T    Collection Time: 07/06/24 12:55 AM    Specimen: Blood   Result Value Ref Range    HS Troponin T 40 (H) <14 ng/L   Green Top (Gel)    Collection Time: 07/06/24 12:55 AM   Result Value Ref Range    Extra Tube Hold for add-ons.    Lavender Top    Collection Time: 07/06/24 12:55 AM   Result Value Ref Range    Extra Tube hold for add-on    Gold Top - SST    Collection Time: 07/06/24 12:55 AM   Result Value Ref Range    Extra Tube Hold for add-ons.    Gray Top    Collection Time: 07/06/24 12:55 AM   Result Value Ref Range    Extra Tube Hold for add-ons.    Light Blue Top    Collection Time: 07/06/24 12:55 AM   Result Value Ref Range    Extra Tube Hold for add-ons.    CBC Auto Differential    Collection Time: 07/06/24 12:55 AM    Specimen: Blood   Result Value Ref Range    WBC 8.24 3.40 - 10.80 10*3/mm3    RBC 4.88 3.77 - 5.28 10*6/mm3    Hemoglobin 11.1 (L) 12.0 - 15.9 g/dL    Hematocrit 38.2 34.0 - 46.6 %    MCV 78.3 (L) 79.0 - 97.0 fL    MCH 22.7 (L) 26.6 - 33.0 pg    MCHC 29.1 (L) 31.5 - 35.7 g/dL    RDW 14.6 12.3 - 15.4 %    RDW-SD 41.7 37.0 - 54.0 fl    MPV 10.8 6.0 - 12.0 fL    Platelets 333 140 - 450 10*3/mm3    Neutrophil % 64.3 42.7 - 76.0 %    Lymphocyte % 27.2 19.6 - 45.3 %    Monocyte % 6.8 5.0 - 12.0 %    Eosinophil % 0.8 0.3 - 6.2 %    Basophil % 0.5 0.0 - 1.5 %    Immature Grans % 0.4 0.0 - 0.5 %    Neutrophils, Absolute 5.30 1.70 - 7.00 10*3/mm3    Lymphocytes, Absolute 2.24 0.70 - 3.10 10*3/mm3    Monocytes, Absolute 0.56 0.10 - 0.90 10*3/mm3    Eosinophils, Absolute 0.07 0.00 - 0.40 10*3/mm3    Basophils, Absolute 0.04 0.00 - 0.20 10*3/mm3    Immature Grans, Absolute 0.03 0.00 - 0.05 10*3/mm3    nRBC 0.0 0.0 - 0.2 /100 WBC   Lipase    Collection Time: 07/06/24 12:55 AM    Specimen: Blood   Result Value Ref Range    Lipase 17 13 - 60 U/L     Note: In addition to lab  results from this visit, the labs listed above may include labs taken at another facility or during a different encounter within the last 24 hours. Please correlate lab times with ED admission and discharge times for further clarification of the services performed during this visit.    XR Chest 1 View   Final Result   Impression:   Limited lordotic view with new bibasilar airspace disease, left greater than right.            Electronically Signed: Leonides Vasquez MD     7/6/2024 1:02 AM EDT     Workstation ID: [id number]        Vitals:    07/06/24 0129 07/06/24 0159 07/06/24 0230 07/06/24 0301   BP: 160/95 (!) 163/101 162/98    BP Location:       Patient Position:       Pulse: 93 93 99 95   Resp:       Temp:       TempSrc:       SpO2: 94% 94% 96%    Weight:       Height:         Medications   sodium chloride 0.9 % flush 10 mL (has no administration in time range)   furosemide (LASIX) injection 80 mg (80 mg Intravenous Given 7/6/24 0246)     ECG/EMG Results (last 24 hours)       Procedure Component Value Units Date/Time    ECG 12 Lead ED Triage Standing Order; SOA [[id number]] Collected: 07/06/24 0026     Updated: 07/06/24 0026     QT Interval 354 ms      QTC Interval 461 ms     Narrative:      Test Reason : ED Triage Standing Order~  Blood Pressure :   */*   mmHG  Vent. Rate : 102 BPM     Atrial Rate : 102 BPM     P-R Int : 138 ms          QRS Dur :  78 ms      QT Int : 354 ms       P-R-T Axes :  60   9  33 degrees     QTc Int : 461 ms    Sinus tachycardia  Possible Left atrial enlargement  Low voltage QRS  Nonspecific T wave abnormality  Abnormal ECG  When compared with ECG of 04-JUN-2018 11:05,  Nonspecific T wave abnormality now evident in Inferior leads    Referred By: EDMD           Confirmed By:           ECG 12 Lead ED Triage Standing Order; SOA   Preliminary Result   Test Reason : ED Triage Standing Order~   Blood Pressure :   */*   mmHG   Vent. Rate : 102 BPM     Atrial Rate : 102 BPM      P-R Int : 138 ms           QRS Dur :  78 ms       QT Int : 354 ms       P-R-T Axes :  60   9  33 degrees      QTc Int : 461 ms      Sinus tachycardia   Possible Left atrial enlargement   Low voltage QRS   Nonspecific T wave abnormality   Abnormal ECG   When compared with ECG of 04-JUN-2018 11:05,   Nonspecific T wave abnormality now evident in Inferior leads      Referred By: EDMD           Confirmed By:               Final diagnoses:   Shortness of breath   New onset of congestive heart failure   Hyponatremia   Orthopnea   Dyspnea on exertion   Poorly-controlled hypertension       ED Disposition  ED Disposition       ED Disposition   Decision to Admit    Condition   --    Comment   --               No follow-up provider specified.       Medication List      No changes were made to your prescriptions during this visit.            Marcella Cardenas MD  07/10/24 8251

## 2024-07-07 LAB
GLUCOSE BLDC GLUCOMTR-MCNC: 184 MG/DL (ref 70–130)
GLUCOSE BLDC GLUCOMTR-MCNC: 202 MG/DL (ref 70–130)
GLUCOSE BLDC GLUCOMTR-MCNC: 232 MG/DL (ref 70–130)
GLUCOSE BLDC GLUCOMTR-MCNC: 271 MG/DL (ref 70–130)
QT INTERVAL: 354 MS
QTC INTERVAL: 461 MS

## 2024-07-07 PROCEDURE — 25010000002 ENOXAPARIN PER 10 MG: Performed by: NURSE PRACTITIONER

## 2024-07-07 PROCEDURE — 63710000001 INSULIN LISPRO (HUMAN) PER 5 UNITS: Performed by: NURSE PRACTITIONER

## 2024-07-07 PROCEDURE — 99232 SBSQ HOSP IP/OBS MODERATE 35: CPT | Performed by: FAMILY MEDICINE

## 2024-07-07 PROCEDURE — 63710000001 INSULIN GLARGINE PER 5 UNITS: Performed by: NURSE PRACTITIONER

## 2024-07-07 PROCEDURE — 82948 REAGENT STRIP/BLOOD GLUCOSE: CPT

## 2024-07-07 PROCEDURE — 99214 OFFICE O/P EST MOD 30 MIN: CPT | Performed by: HOSPITALIST

## 2024-07-07 PROCEDURE — G0378 HOSPITAL OBSERVATION PER HR: HCPCS

## 2024-07-07 RX ADMIN — SPIRONOLACTONE 25 MG: 25 TABLET ORAL at 08:23

## 2024-07-07 RX ADMIN — HYDROXYZINE HYDROCHLORIDE 25 MG: 25 TABLET ORAL at 20:38

## 2024-07-07 RX ADMIN — INSULIN LISPRO 4 UNITS: 100 INJECTION, SOLUTION INTRAVENOUS; SUBCUTANEOUS at 08:14

## 2024-07-07 RX ADMIN — BUMETANIDE 2 MG: 1 TABLET ORAL at 08:15

## 2024-07-07 RX ADMIN — CARVEDILOL 12.5 MG: 12.5 TABLET, FILM COATED ORAL at 20:31

## 2024-07-07 RX ADMIN — BUMETANIDE 2 MG: 1 TABLET ORAL at 17:02

## 2024-07-07 RX ADMIN — LOSARTAN POTASSIUM 50 MG: 50 TABLET, FILM COATED ORAL at 08:16

## 2024-07-07 RX ADMIN — INSULIN LISPRO 6 UNITS: 100 INJECTION, SOLUTION INTRAVENOUS; SUBCUTANEOUS at 11:54

## 2024-07-07 RX ADMIN — INSULIN LISPRO 4 UNITS: 100 INJECTION, SOLUTION INTRAVENOUS; SUBCUTANEOUS at 20:30

## 2024-07-07 RX ADMIN — EMPAGLIFLOZIN 10 MG: 10 TABLET, FILM COATED ORAL at 08:16

## 2024-07-07 RX ADMIN — INSULIN GLARGINE 10 UNITS: 100 INJECTION, SOLUTION SUBCUTANEOUS at 20:31

## 2024-07-07 RX ADMIN — INSULIN LISPRO 2 UNITS: 100 INJECTION, SOLUTION INTRAVENOUS; SUBCUTANEOUS at 17:02

## 2024-07-07 RX ADMIN — HYDROXYZINE HYDROCHLORIDE 25 MG: 25 TABLET ORAL at 11:57

## 2024-07-07 RX ADMIN — Medication 10 ML: at 20:32

## 2024-07-07 RX ADMIN — PANTOPRAZOLE SODIUM 40 MG: 40 TABLET, DELAYED RELEASE ORAL at 05:21

## 2024-07-07 RX ADMIN — CARVEDILOL 12.5 MG: 12.5 TABLET, FILM COATED ORAL at 08:16

## 2024-07-07 RX ADMIN — ISOSORBIDE MONONITRATE 30 MG: 30 TABLET, EXTENDED RELEASE ORAL at 08:15

## 2024-07-07 RX ADMIN — Medication 10 ML: at 08:17

## 2024-07-07 RX ADMIN — HYDROXYZINE HYDROCHLORIDE 25 MG: 25 TABLET ORAL at 00:47

## 2024-07-07 NOTE — PROGRESS NOTES
[facility] Medicine Services  PROGRESS NOTE    Patient Name: Michelle Weaver  : 1965  MRN: [id number]    Date of Admission: 2024  Primary Care Physician: Milad Patterson PA-C    Subjective   Subjective     CC:  Heart failure    HPI:  Patient was hesitant this morning to proceed with left heart cath but we discussed the only way to know what is caused her heart failure is to start with an evaluation for ischemic disease.  She is now agreeable.  She feels better and her swelling has much improved.  She is less short of air and is on room air.  Also discussed this could potentially be done as an outpatient but since she is already here and cardiology plans to do it tomorrow she would like to stay.      Objective   Objective     Vital Signs:   Temp:  [97.7 °F (36.5 °C)-99.2 °F (37.3 °C)] 97.7 °F (36.5 °C)  Heart Rate:  [76-92] 85  Resp:  [16-20] 18  BP: (129-151)/(74-91) 144/74     Physical Exam:  Constitutional: No acute distress, awake, alert  HENT: NCAT, mucous membranes moist  Respiratory: Clear to auscultation bilaterally, respiratory effort normal   Cardiovascular: RRR  Gastrointestinal: Positive bowel sounds, soft, nontender, nondistended  Musculoskeletal: Trace bilateral lower extremity edema at the ankles and feet  Psychiatric: Appropriate affect, cooperative  Neurologic: Oriented x 3, strength symmetric in all extremities, Cranial Nerves grossly intact to confrontation, speech clear  Skin: No rashes      Results Reviewed:  LAB RESULTS:      Lab 24  0055   WBC 8.24   HEMOGLOBIN 11.1*   HEMATOCRIT 38.2   PLATELETS 333   NEUTROS ABS 5.30   IMMATURE GRANS (ABS) 0.03   LYMPHS ABS 2.24   MONOS ABS 0.56   EOS ABS 0.07   MCV 78.3*         Lab 24  2034 24  1532 24  1145 24  0819 24  0346 24  0055   SODIUM 136 138 137 133*  --  126*   POTASSIUM  --   --   --   --   --  4.3   CHLORIDE  --   --   --   --   --  90*   CO2  --   --   --   --    --  26.0   ANION GAP  --   --   --   --   --  10.0   BUN  --   --   --   --   --  8   CREATININE  --   --   --   --   --  0.91   EGFR  --   --   --   --   --  72.4   GLUCOSE  --   --   --   --   --  310*   CALCIUM  --   --   --   --   --  8.2*   HEMOGLOBIN A1C  --   --   --   --   --  11.60*   TSH  --   --   --   --  5.990*  --          Lab 07/06/24  0055   TOTAL PROTEIN 6.5   ALBUMIN 3.2*   GLOBULIN 3.3   ALT (SGPT) 23   AST (SGOT) 27   BILIRUBIN 1.1   ALK PHOS 96   LIPASE 17         Lab 07/06/24  0346 07/06/24 0055   PROBNP  --  9,791.0*   HSTROP T 36* 40*         Lab 07/06/24 0346   CHOLESTEROL 180   LDL CHOL 107*   HDL CHOL 47   TRIGLYCERIDES 150         Lab 07/06/24  0346 07/06/24 0055   IRON 26*  --    IRON SATURATION (TSAT) 6*  --    TIBC 407  --    TRANSFERRIN 273  --    FERRITIN 27.73  --    FOLATE  --  15.40   VITAMIN B 12  --  474         Brief Urine Lab Results       None            Microbiology Results Abnormal       Procedure Component Value - Date/Time    COVID PRE-OP / PRE-PROCEDURE SCREENING ORDER (NO ISOLATION) - Swab, Nasopharynx [[id number]]  (Normal) Collected: 07/06/24 0052    Lab Status: Final result Specimen: Swab from Nasopharynx Updated: 07/06/24 0152    Narrative:      The following orders were created for panel order COVID PRE-OP / PRE-PROCEDURE SCREENING ORDER (NO ISOLATION) - Swab, Nasopharynx.  Procedure                               Abnormality         Status                     ---------                               -----------         ------                     Respiratory Panel PCR w/...[[id number]]  Normal              Final result                 Please view results for these tests on the individual orders.    Respiratory Panel PCR w/COVID-19(SARS-CoV-2) MARIA E/MIGUEL ANGEL/TELLO/PAD/COR/NUVIA In-House, NP Swab in [facility], 2 HR TAT - Swab, Nasopharynx [[id number]]  (Normal) Collected: 07/06/24 0052    Lab Status: Final result Specimen: Swab from Nasopharynx Updated: 07/06/24 0152      ADENOVIRUS, PCR Not Detected     Coronavirus 229E Not Detected     Coronavirus HKU1 Not Detected     Coronavirus NL63 Not Detected     Coronavirus OC43 Not Detected     COVID19 Not Detected     Human Metapneumovirus Not Detected     Human Rhinovirus/Enterovirus Not Detected     Influenza A PCR Not Detected     Influenza B PCR Not Detected     Parainfluenza Virus 1 Not Detected     Parainfluenza Virus 2 Not Detected     Parainfluenza Virus 3 Not Detected     Parainfluenza Virus 4 Not Detected     RSV, PCR Not Detected     Bordetella pertussis pcr Not Detected     Bordetella parapertussis PCR Not Detected     Chlamydophila pneumoniae PCR Not Detected     Mycoplasma pneumo by PCR Not Detected    Narrative:      In the setting of a positive respiratory panel with a viral infection PLUS a negative procalcitonin without other underlying concern for bacterial infection, consider observing off antibiotics or discontinuation of antibiotics and continue supportive care. If the respiratory panel is positive for atypical bacterial infection (Bordetella pertussis, Chlamydophila pneumoniae, or Mycoplasma pneumoniae), consider antibiotic de-escalation to target atypical bacterial infection.            Adult Transthoracic Echo Complete With Contrast if Necessary Per Protocol    Result Date: 7/6/2024    Left ventricular systolic function is moderately decreased. Estimated left ventricular EF = 25% Left ventricular ejection fraction appears to be 21 - 25%.   The left ventricular cavity is borderline dilated.   The right ventricular cavity is borderline dilated.   The left atrial cavity is dilated.   Left atrial volume is mildly increased.     XR Chest 1 View    Result Date: 7/6/2024  XR CHEST 1 VW Date of Exam: 7/6/2024 12:40 AM EDT Indication: SOA triage protocol Comparison: 6/4/2018. Findings: Limited lordotic view. There appear to be new bibasilar patchy airspace opacities, left greater than right. Cardiac silhouette is enlarged,  which could be artifact. Pulmonary vasculature is unremarkable. No pneumothorax or definite pleural effusion.     Impression: Impression: Limited lordotic view with new bibasilar airspace disease, left greater than right. Electronically Signed: Leonides Vasquez MD  7/6/2024 1:02 AM EDT  Workstation ID: [id number]     Results for orders placed during the hospital encounter of 07/06/24    Adult Transthoracic Echo Complete With Contrast if Necessary Per Protocol    Interpretation Summary    Left ventricular systolic function is moderately decreased. Estimated left ventricular EF = 25% Left ventricular ejection fraction appears to be 21 - 25%.    The left ventricular cavity is borderline dilated.    The right ventricular cavity is borderline dilated.    The left atrial cavity is dilated.    Left atrial volume is mildly increased.      Current medications:  Scheduled Meds:bumetanide, 2 mg, Oral, BID  carvedilol, 12.5 mg, Oral, BID  empagliflozin, 10 mg, Oral, Daily  enoxaparin, 40 mg, Subcutaneous, Daily  insulin glargine, 10 Units, Subcutaneous, Nightly  insulin lispro, 2-9 Units, Subcutaneous, 4x Daily AC & at Bedtime  isosorbide mononitrate, 30 mg, Oral, Q24H  losartan, 50 mg, Oral, Q24H  pantoprazole, 40 mg, Oral, Q AM  pharmacy consult - MTM, , Does not apply, Daily  rosuvastatin, 10 mg, Oral, Nightly  sodium chloride, 10 mL, Intravenous, Q12H  spironolactone, 25 mg, Oral, Daily      Continuous Infusions:   PRN Meds:.  acetaminophen **OR** acetaminophen **OR** acetaminophen    senna-docusate sodium **AND** polyethylene glycol **AND** bisacodyl **AND** bisacodyl    dextrose    dextrose    glucagon (human recombinant)    hydrOXYzine    melatonin    nitroglycerin    ondansetron ODT **OR** ondansetron    sodium chloride    sodium chloride    sodium chloride    Assessment & Plan   Assessment & Plan     Active Hospital Problems    Diagnosis  POA    **Acute exacerbation of CHF (congestive heart failure) [I50.9]  Unknown     Hypertensive urgency [I16.0]  Unknown    Hyponatremia [E87.1]  Unknown    CHF (congestive heart failure) [I50.9]  Yes    Noncompliance with treatment plan [Z91.199]  Not Applicable    Hyperlipidemia [E78.5]  Yes    Uncontrolled type 2 diabetes mellitus with hyperglycemia [E11.65]  Yes    Panic attacks [F41.0]  Yes      Resolved Hospital Problems    Diagnosis Date Resolved POA    Primary hypertension [I10] 07/06/2024 Yes        Brief Hospital Course to date:  Michelle Weaver is a 59 y.o. female admitted with acute congestive heart failure exacerbation and mild hyponatremia.    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)  - daily weights  - strict intake and output  - ECHO shows reduced EF 25%  - troponin flat  -Initially required NTG drip to keep SBP < 140,  now off  -Plans for left heart cath on 7/8/2024     Hypertensive urgency  - previously on carvedilol and lisinopril, discontinued secondary to hyponatremia  -Resumed carvedilol  -Add Imdur 30 mg  -Cardiology changed lisinopril to losartan     Hyponatremia, mild  - Na 126, corrected sodium 129, now improved to 133  - serial sodium checks  - likely r/t uncontrolled DM and CHF  - monitor closely on diuretics  -Patient states she was previously told it was her carvedilol and lisinopril that caused the hyponatremia so both were stopped  -Resume carvedilol, discontinued lisinopril and cardiology added losartan     Uncontrolled T2DM w/ hyperglycemia  - glucose 310 initially  - A1c 11.60  - A1c 13.50 in 2022, down to 10.6 in 2023  -Continue levemir 10 units nightly  - fsbg achs w/ moderate dose ssi  - DM educator consult  -Could consider semaglutide     HLD  - lipid panel  - restart statin     Panic attacks  - atarax PRN    Expected Discharge Location and Transportation: Home pending left heart cath on  7/8/2024  Expected Discharge   Expected Discharge Date: 7/7/2024; Expected Discharge Time:      VTE Prophylaxis:  Pharmacologic VTE prophylaxis orders are present.         AM-PAC 6 Clicks Score (PT): 24 (07/07/24 0800)    CODE STATUS:   Code Status and Medical Interventions:   Ordered at: 07/06/24 0610     Code Status (Patient has no pulse and is not breathing):    CPR (Attempt to Resuscitate)     Medical Interventions (Patient has pulse or is breathing):    Full Support       Alice Mcknight MD  07/07/24

## 2024-07-07 NOTE — PROGRESS NOTES
[facility] Cardiology    Inpatient Progress Note      Chief Complaint/Reason for service:    Follow up shortness of breath         Subjective:       Patient resting in bed. Feeling better today.  Good response to diuresis. Denies chest pain, shortness of breath or palpitations.     Past medical, surgical, social and family history reviewed in the patient's electronic medical record.         Objective:      Infusions:        Medications:    Current Facility-Administered Medications:     acetaminophen (TYLENOL) tablet 650 mg, 650 mg, Oral, Q4H PRN **OR** acetaminophen (TYLENOL) 160 MG/5ML oral solution 650 mg, 650 mg, Oral, Q4H PRN **OR** acetaminophen (TYLENOL) suppository 650 mg, 650 mg, Rectal, Q4H PRN, Savannah Ramos APRN    sennosides-docusate (PERICOLACE) 8.6-50 MG per tablet 2 tablet, 2 tablet, Oral, BID PRN **AND** polyethylene glycol (MIRALAX) packet 17 g, 17 g, Oral, Daily PRN **AND** bisacodyl (DULCOLAX) EC tablet 5 mg, 5 mg, Oral, Daily PRN **AND** bisacodyl (DULCOLAX) suppository 10 mg, 10 mg, Rectal, Daily PRN, Savannah Ramos APRN    bumetanide (BUMEX) tablet 2 mg, 2 mg, Oral, BID, Geremias Laughlin MD, 2 mg at 07/07/24 0815    carvedilol (COREG) tablet 12.5 mg, 12.5 mg, Oral, BID, Savannah Ramos APRN, 12.5 mg at 07/07/24 0816    dextrose (D50W) (25 g/50 mL) IV injection 25 g, 25 g, Intravenous, Q15 Min PRN, Savannah Ramos APRN    dextrose (GLUTOSE) oral gel 15 g, 15 g, Oral, Q15 Min PRN, Savannah Ramos APRN    empagliflozin (JARDIANCE) tablet 10 mg, 10 mg, Oral, Daily, Geremias Laughlin MD, 10 mg at 07/07/24 0816    Enoxaparin Sodium (LOVENOX) syringe 40 mg, 40 mg, Subcutaneous, Daily, Savannah Ramos APRN, 40 mg at 07/06/24 0811    glucagon (GLUCAGEN) injection 1 mg, 1 mg, Intramuscular, Q15 Min PRN, Savannah Ramos APRN    hydrOXYzine (ATARAX) tablet 25 mg, 25 mg, Oral, TID PRN, Savannah Ramos APRN, 25 mg at 07/07/24 0047    insulin glargine (LANTUS, SEMGLEE) injection 10 Units,  10 Units, Subcutaneous, Nightly, Savannah Ramos, APRN, 10 Units at 07/06/24 2044    Insulin Lispro (humaLOG) injection 2-9 Units, 2-9 Units, Subcutaneous, 4x Daily AC & at Bedtime, Savannah Ramos, APRN, 4 Units at 07/07/24 0814    isosorbide mononitrate (IMDUR) 24 hr tablet 30 mg, 30 mg, Oral, Q24H, Alice Mcknight MD, 30 mg at 07/07/24 0815    losartan (COZAAR) tablet 50 mg, 50 mg, Oral, Q24H, Geremias Laughlin MD, 50 mg at 07/07/24 0816    melatonin tablet 5 mg, 5 mg, Oral, Nightly PRN, Savannah Ramos, APRN    nitroglycerin (NITROSTAT) SL tablet 0.4 mg, 0.4 mg, Sublingual, Q5 Min PRN, Savannah Ramos, APRN    ondansetron ODT (ZOFRAN-ODT) disintegrating tablet 4 mg, 4 mg, Oral, Q6H PRN **OR** ondansetron (ZOFRAN) injection 4 mg, 4 mg, Intravenous, Q6H PRN, Savannah Ramos, APRN    pantoprazole (PROTONIX) EC tablet 40 mg, 40 mg, Oral, Q AM, Savannah Ramos, APRN, 40 mg at 07/07/24 0521    Pharmacy Consult - MT, , Does not apply, Daily, Holden Childesr, PharmD    rosuvastatin (CRESTOR) tablet 10 mg, 10 mg, Oral, Nightly, Savannah Ramos M, APRN    sodium chloride 0.9 % flush 10 mL, 10 mL, Intravenous, PRN, Savannah Ramos M, APRN    sodium chloride 0.9 % flush 10 mL, 10 mL, Intravenous, Q12H, Savannah Ramos, APRN, 10 mL at 07/07/24 0817    sodium chloride 0.9 % flush 10 mL, 10 mL, Intravenous, PRN, Savannah Ramos M, APRN    sodium chloride 0.9 % infusion 40 mL, 40 mL, Intravenous, PRN, Savannah Ramos M, APRN    spironolactone (ALDACTONE) tablet 25 mg, 25 mg, Oral, Daily, Geremias Laughlin MD, 25 mg at 07/07/24 0823    Vital Sign Min/Max for last 24 hours  Temp  Min: 97.7 °F (36.5 °C)  Max: 99.2 °F (37.3 °C)   BP  Min: 119/78  Max: 151/86   Pulse  Min: 76  Max: 92   Resp  Min: 16  Max: 20   SpO2  Min: 88 %  Max: 100 %   No data recorded      Intake/Output Summary (Last 24 hours) at 7/7/2024 1018  Last data filed at 7/7/2024 1014  Gross per 24 hour   Intake 2220 ml   Output 4802 ml   Net -2582 ml           CONSTITUTIONAL: No  acute distress  RESPIRATORY: Normal effort. Clear to auscultation bilaterally without wheezing or rales  CARDIOVASCULAR: Regular rate and rhythm with normal S1 and S2. Without murmur. There is no lower extremity edema bilaterally. Normal radial pulse.     Labs/studies:  Available lab and imaging results were reviewed by myself today    Results for orders placed during the hospital encounter of 07/06/24    Adult Transthoracic Echo Complete With Contrast if Necessary Per Protocol    Interpretation Summary    Left ventricular systolic function is moderately decreased. Estimated left ventricular EF = 25% Left ventricular ejection fraction appears to be 21 - 25%.    The left ventricular cavity is borderline dilated.    The right ventricular cavity is borderline dilated.    The left atrial cavity is dilated.    Left atrial volume is mildly increased.      Tele:  NSR          Assessment/Plan:         Acute exacerbation of CHF (congestive heart failure)    Panic attacks    Uncontrolled type 2 diabetes mellitus with hyperglycemia    Hyperlipidemia    Noncompliance with treatment plan    Hypertensive urgency    Hyponatremia    CHF (congestive heart failure)       ASSESSMENT:  Cardiomyopathy   Echocardiogram shows EF 25-30%  Acute congestive heart failure   Elevated proBNP of 9791, CTA chest with interstitial edema.   Echocardiogram pending   Hypertensive urgency  Hyperlipidemia  Type 2 diabetes mellitus  Hyponatremia  Possible sleep apnea     PLAN:  Continue Bumex 2 mg twice daily.   Continue carvedilol, losartan, Jardiance, spironolactone.   Plan for left heart cath +/- PCI tomorrow with Dr. Laughlin.   The risks, benefits, and alternatives of the procedure have been reviewed and the patient wishes to proceed.   NPO after midnight tonight     Electronically signed by DARLENE Gómez, 07/07/24, 10:18 AM EDT.

## 2024-07-07 NOTE — PLAN OF CARE
Goal Outcome Evaluation:  Plan of Care Reviewed With: patient        Progress: improving  Outcome Evaluation: Pt A/O x4, VSS, on RA, NSR on the monitor. Atarax given for anxiety. No acute events overnight.

## 2024-07-07 NOTE — PLAN OF CARE
Goal Outcome Evaluation:         Patient Aox4, RA, SR, and up with standby to independent. Diuretics given. Strict I/Os. No complaints of SOB or pain. Atarax given for PRN d/t anxiety. NPO @ midnight for heart cath.

## 2024-07-08 ENCOUNTER — TELEPHONE (OUTPATIENT)
Dept: FAMILY MEDICINE CLINIC | Facility: CLINIC | Age: 59
End: 2024-07-08
Payer: COMMERCIAL

## 2024-07-08 PROBLEM — I50.9 NEW ONSET OF CONGESTIVE HEART FAILURE: Status: ACTIVE | Noted: 2024-07-06

## 2024-07-08 LAB
ANION GAP SERPL CALCULATED.3IONS-SCNC: 7 MMOL/L (ref 5–15)
BUN SERPL-MCNC: 16 MG/DL (ref 8–23)
BUN/CREAT SERPL: 14.5 (ref 7–25)
CALCIUM SPEC-SCNC: 8.5 MG/DL (ref 8.6–10.5)
CHLORIDE SERPL-SCNC: 100 MMOL/L (ref 98–107)
CO2 SERPL-SCNC: 36 MMOL/L (ref 22–29)
CREAT SERPL-MCNC: 1.1 MG/DL (ref 0.57–1)
EGFRCR SERPLBLD CKD-EPI 2021: 57.6 ML/MIN/1.73
GLUCOSE BLDC GLUCOMTR-MCNC: 117 MG/DL (ref 70–130)
GLUCOSE BLDC GLUCOMTR-MCNC: 122 MG/DL (ref 70–130)
GLUCOSE BLDC GLUCOMTR-MCNC: 131 MG/DL (ref 70–130)
GLUCOSE BLDC GLUCOMTR-MCNC: 203 MG/DL (ref 70–130)
GLUCOSE BLDC GLUCOMTR-MCNC: 218 MG/DL (ref 70–130)
GLUCOSE SERPL-MCNC: 141 MG/DL (ref 65–99)
POTASSIUM SERPL-SCNC: 3.9 MMOL/L (ref 3.5–5.2)
SODIUM SERPL-SCNC: 143 MMOL/L (ref 136–145)

## 2024-07-08 PROCEDURE — 25010000002 LIDOCAINE 1 % SOLUTION: Performed by: INTERNAL MEDICINE

## 2024-07-08 PROCEDURE — 25810000003 SODIUM CHLORIDE 0.9 % SOLUTION 250 ML FLEX CONT: Performed by: NURSE PRACTITIONER

## 2024-07-08 PROCEDURE — 25010000002 BIVALIRUDIN TRIFLUOROACETATE 250 MG RECONSTITUTED SOLUTION 1 EACH VIAL: Performed by: INTERNAL MEDICINE

## 2024-07-08 PROCEDURE — 25010000002 NICARDIPINE 2.5 MG/ML SOLUTION: Performed by: INTERNAL MEDICINE

## 2024-07-08 PROCEDURE — 25010000002 ONDANSETRON PER 1 MG: Performed by: INTERNAL MEDICINE

## 2024-07-08 PROCEDURE — 25010000002 NA FERRIC GLUC CPLX PER 12.5 MG: Performed by: NURSE PRACTITIONER

## 2024-07-08 PROCEDURE — C1769 GUIDE WIRE: HCPCS | Performed by: INTERNAL MEDICINE

## 2024-07-08 PROCEDURE — 25810000003 SODIUM CHLORIDE 0.9 % SOLUTION: Performed by: INTERNAL MEDICINE

## 2024-07-08 PROCEDURE — 63710000001 INSULIN GLARGINE PER 5 UNITS: Performed by: INTERNAL MEDICINE

## 2024-07-08 PROCEDURE — 80048 BASIC METABOLIC PNL TOTAL CA: CPT | Performed by: PHYSICIAN ASSISTANT

## 2024-07-08 PROCEDURE — 82948 REAGENT STRIP/BLOOD GLUCOSE: CPT

## 2024-07-08 PROCEDURE — C1894 INTRO/SHEATH, NON-LASER: HCPCS | Performed by: INTERNAL MEDICINE

## 2024-07-08 PROCEDURE — 93571 IV DOP VEL&/PRESS C FLO 1ST: CPT | Performed by: INTERNAL MEDICINE

## 2024-07-08 PROCEDURE — 25010000002 MIDAZOLAM PER 1 MG: Performed by: INTERNAL MEDICINE

## 2024-07-08 PROCEDURE — 25010000002 FENTANYL CITRATE (PF) 50 MCG/ML SOLUTION: Performed by: INTERNAL MEDICINE

## 2024-07-08 PROCEDURE — G0378 HOSPITAL OBSERVATION PER HR: HCPCS

## 2024-07-08 PROCEDURE — 25010000002 ENOXAPARIN PER 10 MG: Performed by: INTERNAL MEDICINE

## 2024-07-08 PROCEDURE — 93799 UNLISTED CV SVC/PROCEDURE: CPT | Performed by: INTERNAL MEDICINE

## 2024-07-08 PROCEDURE — 25510000001 IOPAMIDOL PER 1 ML: Performed by: INTERNAL MEDICINE

## 2024-07-08 PROCEDURE — C1887 CATHETER, GUIDING: HCPCS | Performed by: INTERNAL MEDICINE

## 2024-07-08 PROCEDURE — 93458 L HRT ARTERY/VENTRICLE ANGIO: CPT | Performed by: INTERNAL MEDICINE

## 2024-07-08 PROCEDURE — 25010000002 HEPARIN (PORCINE) PER 1000 UNITS: Performed by: INTERNAL MEDICINE

## 2024-07-08 PROCEDURE — 63710000001 INSULIN LISPRO (HUMAN) PER 5 UNITS: Performed by: INTERNAL MEDICINE

## 2024-07-08 PROCEDURE — 99214 OFFICE O/P EST MOD 30 MIN: CPT | Performed by: INTERNAL MEDICINE

## 2024-07-08 PROCEDURE — 99232 SBSQ HOSP IP/OBS MODERATE 35: CPT | Performed by: STUDENT IN AN ORGANIZED HEALTH CARE EDUCATION/TRAINING PROGRAM

## 2024-07-08 RX ORDER — ONDANSETRON 2 MG/ML
INJECTION INTRAMUSCULAR; INTRAVENOUS
Status: DISCONTINUED | OUTPATIENT
Start: 2024-07-08 | End: 2024-07-08 | Stop reason: HOSPADM

## 2024-07-08 RX ORDER — NITROGLYCERIN 0.4 MG/1
0.4 TABLET SUBLINGUAL
Status: DISCONTINUED | OUTPATIENT
Start: 2024-07-08 | End: 2024-07-09 | Stop reason: HOSPADM

## 2024-07-08 RX ORDER — NICARDIPINE HCL-0.9% SOD CHLOR 1 MG/10 ML
SYRINGE (ML) INTRAVENOUS
Status: DISCONTINUED | OUTPATIENT
Start: 2024-07-08 | End: 2024-07-08 | Stop reason: HOSPADM

## 2024-07-08 RX ORDER — SIMETHICONE 80 MG
80 TABLET,CHEWABLE ORAL 4 TIMES DAILY PRN
Status: DISCONTINUED | OUTPATIENT
Start: 2024-07-08 | End: 2024-07-09 | Stop reason: HOSPADM

## 2024-07-08 RX ORDER — ENOXAPARIN SODIUM 100 MG/ML
40 INJECTION SUBCUTANEOUS EVERY 12 HOURS
Status: DISCONTINUED | OUTPATIENT
Start: 2024-07-08 | End: 2024-07-09 | Stop reason: HOSPADM

## 2024-07-08 RX ORDER — FENTANYL CITRATE 50 UG/ML
INJECTION, SOLUTION INTRAMUSCULAR; INTRAVENOUS
Status: DISCONTINUED | OUTPATIENT
Start: 2024-07-08 | End: 2024-07-08 | Stop reason: HOSPADM

## 2024-07-08 RX ORDER — SODIUM CHLORIDE 9 MG/ML
50 INJECTION, SOLUTION INTRAVENOUS CONTINUOUS
Status: ACTIVE | OUTPATIENT
Start: 2024-07-08 | End: 2024-07-08

## 2024-07-08 RX ORDER — LIDOCAINE HYDROCHLORIDE 10 MG/ML
INJECTION, SOLUTION INFILTRATION; PERINEURAL
Status: DISCONTINUED | OUTPATIENT
Start: 2024-07-08 | End: 2024-07-08 | Stop reason: HOSPADM

## 2024-07-08 RX ORDER — MIDAZOLAM HYDROCHLORIDE 1 MG/ML
INJECTION INTRAMUSCULAR; INTRAVENOUS
Status: DISCONTINUED | OUTPATIENT
Start: 2024-07-08 | End: 2024-07-08 | Stop reason: HOSPADM

## 2024-07-08 RX ORDER — ACETAMINOPHEN 325 MG/1
650 TABLET ORAL EVERY 4 HOURS PRN
Status: DISCONTINUED | OUTPATIENT
Start: 2024-07-08 | End: 2024-07-09 | Stop reason: HOSPADM

## 2024-07-08 RX ORDER — HEPARIN SODIUM 1000 [USP'U]/ML
INJECTION, SOLUTION INTRAVENOUS; SUBCUTANEOUS
Status: DISCONTINUED | OUTPATIENT
Start: 2024-07-08 | End: 2024-07-08 | Stop reason: HOSPADM

## 2024-07-08 RX ORDER — BUMETANIDE 1 MG/1
1 TABLET ORAL
Status: DISCONTINUED | OUTPATIENT
Start: 2024-07-08 | End: 2024-07-09 | Stop reason: HOSPADM

## 2024-07-08 RX ORDER — CALCIUM CARBONATE 500 MG/1
1 TABLET, CHEWABLE ORAL 3 TIMES DAILY PRN
Status: DISCONTINUED | OUTPATIENT
Start: 2024-07-08 | End: 2024-07-09 | Stop reason: HOSPADM

## 2024-07-08 RX ADMIN — Medication 10 ML: at 10:53

## 2024-07-08 RX ADMIN — HYDROXYZINE HYDROCHLORIDE 25 MG: 25 TABLET ORAL at 16:21

## 2024-07-08 RX ADMIN — INSULIN GLARGINE 10 UNITS: 100 INJECTION, SOLUTION SUBCUTANEOUS at 20:47

## 2024-07-08 RX ADMIN — SPIRONOLACTONE 25 MG: 25 TABLET ORAL at 16:21

## 2024-07-08 RX ADMIN — BUMETANIDE 1 MG: 1 TABLET ORAL at 18:07

## 2024-07-08 RX ADMIN — CALCIUM CARBONATE (ANTACID) CHEW TAB 500 MG 1 TABLET: 500 CHEW TAB at 18:07

## 2024-07-08 RX ADMIN — SODIUM CHLORIDE 40 ML: 9 INJECTION, SOLUTION INTRAVENOUS at 13:38

## 2024-07-08 RX ADMIN — LOSARTAN POTASSIUM 50 MG: 50 TABLET, FILM COATED ORAL at 16:57

## 2024-07-08 RX ADMIN — SIMETHICONE 80 MG: 80 TABLET, CHEWABLE ORAL at 18:07

## 2024-07-08 RX ADMIN — CARVEDILOL 12.5 MG: 12.5 TABLET, FILM COATED ORAL at 16:21

## 2024-07-08 RX ADMIN — INSULIN LISPRO 4 UNITS: 100 INJECTION, SOLUTION INTRAVENOUS; SUBCUTANEOUS at 16:57

## 2024-07-08 RX ADMIN — INSULIN LISPRO 4 UNITS: 100 INJECTION, SOLUTION INTRAVENOUS; SUBCUTANEOUS at 20:47

## 2024-07-08 RX ADMIN — CARVEDILOL 12.5 MG: 12.5 TABLET, FILM COATED ORAL at 20:46

## 2024-07-08 RX ADMIN — SODIUM CHLORIDE 50 ML/HR: 900 INJECTION, SOLUTION INTRAVENOUS at 14:14

## 2024-07-08 RX ADMIN — SODIUM CHLORIDE 40 ML: 9 INJECTION, SOLUTION INTRAVENOUS at 10:51

## 2024-07-08 RX ADMIN — Medication 10 ML: at 20:47

## 2024-07-08 RX ADMIN — SODIUM CHLORIDE 125 MG: 9 INJECTION, SOLUTION INTRAVENOUS at 10:51

## 2024-07-08 RX ADMIN — ISOSORBIDE MONONITRATE 30 MG: 30 TABLET, EXTENDED RELEASE ORAL at 16:21

## 2024-07-08 RX ADMIN — EMPAGLIFLOZIN 10 MG: 10 TABLET, FILM COATED ORAL at 16:21

## 2024-07-08 RX ADMIN — ENOXAPARIN SODIUM 40 MG: 100 INJECTION SUBCUTANEOUS at 20:47

## 2024-07-08 NOTE — TELEPHONE ENCOUNTER
I left a voicemail for patient to call back and schedule a hospital follow up. Please schedule - please relay.      I contacted the pharmacy but was unable to speak with them as the call got disconnected. The phone number given is not correct. I was transferred 3x. Will try again to get a hold of the patient to schedule a follow up.

## 2024-07-08 NOTE — TELEPHONE ENCOUNTER
I haven't prescribed anything since March or April 2023. She was recently admitted to [facility] so we should go by her med list at discharge. The hospital usually sends in 30 day supplies of her medication. She will need a hospital follow-up here unless she is established somewhere else.

## 2024-07-08 NOTE — PROGRESS NOTES
"[facility]  In-Patient Heart and Valve APRN Progress Note   LOS: 1 day   Patient Care Team:  Milad Patterson PA-C as PCP - General (Physician Assistant)    CARDIAC  Coronary Artery Disease:   LHC, 7/8/2024: Moderate to severe LAD/D1 disease     Myocardium:   Echo, 7/6/2024: EF 20 to 25%     Valvular:   Mild MR     Electrical:   Normal sinus rhythm     Pericardium:   Normal     VASCULAR  Arterial  Cerebrovascular disease:        Peripheral vascular disease:        AAA:        Venous:       AUTONOMIC DYSFUNCTION   Autonomic failure:    Neurally mediated Syncope/vagal:    POTS:      CARDIAC RISK FACTORS  Hypertension  Diabetes  Dyslipidemia  Obesity  Physical Inactivity  Obstructive Sleep Apnea    NON-CARDIAC  Anxiety  Panic attacks  Cellulitis of left leg  Iron deficiency anemia    SURGERIES  Breast cyst excision    Chief Complaint:  acute HFref     Subjective  59 year old female who is resting on the side of the bed. Notes that shortness of breath has resolved. She denies chest pain.   Scheduled for [facility] today     Objective         Patient Active Problem List   Diagnosis    Cellulitis of left leg    Diabetic ulcer of toe of left foot associated with type 2 diabetes mellitus    Anxiety    Panic attacks    Hyperglycemia    Uncontrolled type 2 diabetes mellitus with hyperglycemia    Hyperlipidemia    Cellulitis of great toe of left foot    Noncompliance with treatment plan    Acute exacerbation of CHF (congestive heart failure)    Hypertensive urgency    Hyponatremia    CHF (congestive heart failure)    New onset of congestive heart failure     Past medical,surgical, social and family history reviewed in patient's electronic medical record.     Tele: Sinus Rythym    Vitals:  Blood pressure 144/79, pulse 86, temperature 98 °F (36.7 °C), temperature source Oral, resp. rate 18, height 174 cm (68.5\"), weight 124 kg (272 lb 4.8 oz), SpO2 90%.     Intake/Output Summary (Last 24 hours) at 7/8/2024 0821  Last data " filed at 7/8/2024 0127  Gross per 24 hour   Intake 1440 ml   Output 4400 ml   Net -2960 ml       Physical Exam:      General: alert, no acute distress, acyanotic, well developed, well nourished   Chest: Clear Auscultation   CV: Heart sounds are normal.  Regular rate and rhythm without murmur, gallop or rub.   Abdomen: Soft, non-tender, normal bowel sounds; no bruits, organomegaly or masses.   Extremities: negative   Neuro: Alert and oriented x 3     Results Review:     I reviewed the patient's new clinical results.    Results from last 7 days   Lab Units 07/06/24  0055   WBC 10*3/mm3 8.24   HEMOGLOBIN g/dL 11.1*   HEMATOCRIT % 38.2   PLATELETS 10*3/mm3 333     Results from last 7 days   Lab Units 07/06/24 2034 07/06/24 0819 07/06/24  0055   SODIUM mmol/L 136   < > 126*   POTASSIUM mmol/L  --   --  4.3   CHLORIDE mmol/L  --   --  90*   CO2 mmol/L  --   --  26.0   BUN mg/dL  --   --  8   CREATININE mg/dL  --   --  0.91   CALCIUM mg/dL  --   --  8.2*   BILIRUBIN mg/dL  --   --  1.1   ALK PHOS U/L  --   --  96   ALT (SGPT) U/L  --   --  23   AST (SGOT) U/L  --   --  27   GLUCOSE mg/dL  --   --  310*    < > = values in this interval not displayed.         Results from last 7 days   Lab Units 07/06/24  0346   TSH uIU/mL 5.990*   FREE T4 ng/dL 1.42     Results from last 7 days   Lab Units 07/06/24  0346   CHOLESTEROL mg/dL 180   TRIGLYCERIDES mg/dL 150   HDL CHOL mg/dL 47   LDL CHOL mg/dL 107*     Results from last 7 days   Lab Units 07/06/24  0055   PROBNP pg/mL 9,791.0*     Adult Transthoracic Echo Complete With Contrast if Necessary Per Protocol     Interpretation Summary    Left ventricular systolic function is moderately decreased. Estimated left ventricular EF = 25% Left ventricular ejection fraction appears to be 21 - 25%.    The left ventricular cavity is borderline dilated.    The right ventricular cavity is borderline dilated.    The left atrial cavity is dilated.    Left atrial volume is mildly increased.      Scheduled Meds:bumetanide, 2 mg, Oral, BID  carvedilol, 12.5 mg, Oral, BID  empagliflozin, 10 mg, Oral, Daily  enoxaparin, 40 mg, Subcutaneous, Daily  ferric gluconate, 125 mg, Intravenous, Once  insulin glargine, 10 Units, Subcutaneous, Nightly  insulin lispro, 2-9 Units, Subcutaneous, 4x Daily AC & at Bedtime  isosorbide mononitrate, 30 mg, Oral, Q24H  losartan, 50 mg, Oral, Q24H  pantoprazole, 40 mg, Oral, Q AM  pharmacy consult - MTM, , Does not apply, Daily  rosuvastatin, 10 mg, Oral, Nightly  sodium chloride, 10 mL, Intravenous, Q12H  spironolactone, 25 mg, Oral, Daily            ASSESSMENT/PLAN:  Cardiomyopathy   A. Echo shows ef 25-30%   B. NYHA III   C. Probnp 9791   D. [facility] 7/8/24   E. Will order life vest to be placed prior to discharge   F. Decrease bumex to 1 mg bid    G. Continue coreg 12.5 mg bid, jardiance 10 mg, aldactone 25 mg and losartan 50 mg   H. Will plan on transitioning to entresto at office visit on Weds 7/10  2. Hypertensive urgency  Continue coreg, losartan  3. Iron deficiency anemia   A. Will order ferrous gluconate 125 mg IV x 1 dose today   4. Hyperlipidemia    A. Continue crestor   5.Hyponatremia  A. Resolved   6. Type 2 diabetes   A. Lantus,humalog   7. Possible sleep apnea   A. Will order sleep study outpatient     Aixa Brizeula, DARLENE - 7/8/2024, 08:21 EDT        STAFF CARDIOLOGIST:      SUMMARY:    59 years old with moderate coronary artery disease with heart failure with reduced ejection fraction and proportionate to the coronary artery disease      PERTINENT:    Moderate coronary artery disease  EF 20 to 25%      IMPRESSION:    Heart failure with reduced ejection fraction, stage C, NYHA stage IV to stage II now  Uncontrolled diabetes  Hyperlipidemia      RECOMMENDATIONS:    Appreciate Aixa Brizuela seeing the patient who will be following her aggressively in her clinic for guideline directed medical therapy achievement in next 4 weeks  We will repeat her echo in 6 weeks  I will  see her back in about 4 to 6 weeks  She will need semaglutide for her diabetes  Will switch to Entresto in the morning  Should be able to go home in the morning after LifeVest        I have seen and examined the patient, reviewed the above note, necessary changes were made and I agree with the final note.   Geremias Laughlin MD

## 2024-07-08 NOTE — TELEPHONE ENCOUNTER
Pharmacy Name: [facility]     Pharmacy representative name: WILMER LAVINIA CANELA     Pharmacy representative phone number: [phone]     What medication are you calling in regards to: MEDICATION LIST     What question does the pharmacy have: THE PHARMACY IS NEEDING CLARIFICATION ON THE PATIENTS LIST OF MEDICATIONS. THEY STATE THE PATIENT INFORMED THEM SHE IS NOT ON ANYTHING AND HAS NOT TAKEN ANY MEDICATIONS AND THEY WANT TO VERIFY THIS INFORMATION.

## 2024-07-08 NOTE — CONSULTS
"Diabetes Education  Assessment/Teaching    Patient Name:  Michelle Weaver  YOB: 1965  MRN: [id number]  Admit Date:  7/6/2024      Assessment Date:  7/8/2024  Flowsheet Row Most Recent Value   General Information     Referral From: Blood glucose   Height 174 cm (68.5\")   Height Method Stated   Weight 124 kg (272 lb 4.8 oz)   Weight Method Bed scale   Pregnancy Assessment    Diabetes History    What type of diabetes do you have? Type 2   Length of Diabetes Diagnosis 1 - 5 years   Current DM knowledge fair   Have you had diabetes education/teaching in the past? no   Do you test your blood sugar at home? yes   Frequency of checks 2 X daily   Who performs the test? self   Typical readings 200s   Have you had low blood sugar? (<70mg/dl) no   Have you had high blood sugar? (>140mg/dl) yes   How often do you have high blood sugar? frequently   When was your last high blood sugar? today   Education Preferences    What areas of diabetes would you like to learn about? diabetes complications   Nutrition Information    Assessment Topics    Taking Medication - Assessment Needs education   Problem Solving - Assessment Needs education   Reducing Risk - Assessment Needs education   Healthy Coping - Assessment Needs education   DM Goals             Flowsheet Row Most Recent Value   DM Education Needs    Meter Has own   Frequency of Testing 3 times a day   Blood Glucose Target Range Ranges per ADA guidelines   Medication Insulin, Administration, Pen   Problem Solving Hypoglycemia, Hyperglycemia, Sick days, Signs, Symptoms, Treatment   Reducing Risks A1C testing, Cardiovascular   Physical Activity None   Healthy Coping Appropriate   Motivation Moderate   Teaching Method Explanation, Discussion, Handouts, Teach back   Patient Response Needs reinforcement        Total time spent reviewing chart, preparing education/materials, providing education at bedside, and coordinating care approx 30 minutes.    Consult for diabetes " "education received per blood glucose > 200. Chart reviewed. Pt was seen at bedside today. Permission given for visit.     Discussed and taught about type 2 diabetes self-management, risk factors, and importance of blood glucose control to reduce complications. Reviewed complications that may occur with uncontrolled diabetes.Target blood glucose readings and A1c goals per ADA were reviewed. Reviewed  current A1c 11.6% and discussed its significance. Her previous one noted was 10.6% in March 2023. Signs, symptoms, and treatment of hyperglycemia and hypoglycemia were discussed. Discussed that she may benefit from seeing an endocrinologist to help better control her diabetes.     Lifestyle changes such as physical activity with MD approval and healthy eating were encouraged. Encouraged pt to monitor blood sugar at home3 x daily times per day fasting and 2 hours post prandial. To call PCP if blood sugar is trending high. Encouraged to keep record of blood glucose readings to take to follow up appointment with PCP. Discussed CGMs, specifically Jody and Dexcom. Encouraged her to go to 's websites to view videos on devices. If interested to discuss with provider at follow up visit.    Provided patient with copy of [employer]'s \"What is Diabetes\" handout, \"Blood Glucose Goals\" handout, \"What is A1c\" handout, CGM handout on Jody and Dexcom.    Thank you for this consult.         Electronically signed by:  Laura Ramos RN [facility]  07/08/24 13:26 EDT  "

## 2024-07-08 NOTE — CASE MANAGEMENT/SOCIAL WORK
Discharge Planning Assessment  [facility]     Patient Name: Michelle Weaver  MRN: [id number]  Today's Date: 7/8/2024    Admit Date: 7/6/2024    Plan: HOME   Discharge Needs Assessment    No documentation.                  Discharge Plan       Row Name 07/08/24 1328       Plan    Plan HOME    Patient/Family in Agreement with Plan yes    Plan Comments Met with pt at bedside for DCP.  She resides with her  in [facility].  She is independent with ADLs.  No current DME or HH services.  Confirmed she has [employer] insurance with Rx coverage.  Goal is to return home upon DC.  CM will cont to follow.    Final Discharge Disposition Code 01 - home or self-care                  Continued Care and Services - Admitted Since 7/6/2024    No active coordination exists for this encounter.       Selected Continued Care - Episodes Includes continued care and service providers with selected services from the active episodes listed below      Rising Risk Care Management Episode start date: 7/8/2024   There are no active outsourced providers for this episode.                 Expected Discharge Date and Time       Expected Discharge Date Expected Discharge Time    Jul 8, 2024            Demographic Summary    No documentation.                  Functional Status    No documentation.                  Psychosocial    No documentation.                  Abuse/Neglect    No documentation.                  Legal    No documentation.                  Substance Abuse    No documentation.                  Patient Forms    No documentation.                     Jocelynn Chamberlain RN

## 2024-07-08 NOTE — PROGRESS NOTES
[facility] Medicine Services  PROGRESS NOTE    Patient Name: Michelle Weaver  : 1965  MRN: [id number]    Date of Admission: 2024  Primary Care Physician: Milad Patterson PA-C    Subjective   Subjective     CC:  Shortness of breath, lower extremity swelling     HPI:  Evaluated patient this morning, reports feeling fatigued today. Shortness of breath and bilateral lower extremity edema improving.      Objective   Objective     Vital Signs:   Temp:  [97.7 °F (36.5 °C)-98.8 °F (37.1 °C)] 98 °F (36.7 °C)  Heart Rate:  [79-99] 99  Resp:  [16-18] 16  BP: (119-155)/(64-90) 137/80  Flow (L/min):  [2] 2     Physical Exam:  Constitutional: Awake, alert, resting comfortably  HENT: NCAT, mucous membranes moist  Respiratory: Clear to auscultation bilaterally, respiratory effort normal   Cardiovascular: Regular rate and rhythm  Gastrointestinal: soft, nontender, nondistended  Musculoskeletal: Trace bilateral lower extremity edema at the ankles and feet  Psychiatric: Appropriate affect, cooperative  Neurologic: Alert and oriented x 3, no focal deficits, speech clear  Skin: No rashes      Results Reviewed:  LAB RESULTS:      Lab 24  0055   WBC 8.24   HEMOGLOBIN 11.1*   HEMATOCRIT 38.2   PLATELETS 333   NEUTROS ABS 5.30   IMMATURE GRANS (ABS) 0.03   LYMPHS ABS 2.24   MONOS ABS 0.56   EOS ABS 0.07   MCV 78.3*         Lab 24  0958 24  2034 24  1532 24  1145 24  0819 24  0346 24  0055   SODIUM 143 136 138 137 133*  --  126*   POTASSIUM 3.9  --   --   --   --   --  4.3   CHLORIDE 100  --   --   --   --   --  90*   CO2 36.0*  --   --   --   --   --  26.0   ANION GAP 7.0  --   --   --   --   --  10.0   BUN 16  --   --   --   --   --  8   CREATININE 1.10*  --   --   --   --   --  0.91   EGFR 57.6*  --   --   --   --   --  72.4   GLUCOSE 141*  --   --   --   --   --  310*   CALCIUM 8.5*  --   --   --   --   --  8.2*   HEMOGLOBIN A1C  --   --   --   --   --    --  11.60*   TSH  --   --   --   --   --  5.990*  --          Lab 07/06/24 0055   TOTAL PROTEIN 6.5   ALBUMIN 3.2*   GLOBULIN 3.3   ALT (SGPT) 23   AST (SGOT) 27   BILIRUBIN 1.1   ALK PHOS 96   LIPASE 17         Lab 07/06/24  0346 07/06/24 0055   PROBNP  --  9,791.0*   HSTROP T 36* 40*         Lab 07/06/24 0346   CHOLESTEROL 180   LDL CHOL 107*   HDL CHOL 47   TRIGLYCERIDES 150         Lab 07/06/24 0346 07/06/24 0055   IRON 26*  --    IRON SATURATION (TSAT) 6*  --    TIBC 407  --    TRANSFERRIN 273  --    FERRITIN 27.73  --    FOLATE  --  15.40   VITAMIN B 12  --  474         Brief Urine Lab Results       None            Microbiology Results Abnormal       Procedure Component Value - Date/Time    COVID PRE-OP / PRE-PROCEDURE SCREENING ORDER (NO ISOLATION) - Swab, Nasopharynx [[id number]]  (Normal) Collected: 07/06/24 0052    Lab Status: Final result Specimen: Swab from Nasopharynx Updated: 07/06/24 0152    Narrative:      The following orders were created for panel order COVID PRE-OP / PRE-PROCEDURE SCREENING ORDER (NO ISOLATION) - Swab, Nasopharynx.  Procedure                               Abnormality         Status                     ---------                               -----------         ------                     Respiratory Panel PCR w/...[[id number]]  Normal              Final result                 Please view results for these tests on the individual orders.    Respiratory Panel PCR w/COVID-19(SARS-CoV-2) MARIA E/MIGUEL ANGEL/TELLO/PAD/COR/NUVIA In-House, NP Swab in UTM/VTM, 2 HR TAT - Swab, Nasopharynx [[id number]]  (Normal) Collected: 07/06/24 0052    Lab Status: Final result Specimen: Swab from Nasopharynx Updated: 07/06/24 0152     ADENOVIRUS, PCR Not Detected     Coronavirus 229E Not Detected     Coronavirus HKU1 Not Detected     Coronavirus NL63 Not Detected     Coronavirus OC43 Not Detected     COVID19 Not Detected     Human Metapneumovirus Not Detected     Human Rhinovirus/Enterovirus Not Detected      Influenza A PCR Not Detected     Influenza B PCR Not Detected     Parainfluenza Virus 1 Not Detected     Parainfluenza Virus 2 Not Detected     Parainfluenza Virus 3 Not Detected     Parainfluenza Virus 4 Not Detected     RSV, PCR Not Detected     Bordetella pertussis pcr Not Detected     Bordetella parapertussis PCR Not Detected     Chlamydophila pneumoniae PCR Not Detected     Mycoplasma pneumo by PCR Not Detected    Narrative:      In the setting of a positive respiratory panel with a viral infection PLUS a negative procalcitonin without other underlying concern for bacterial infection, consider observing off antibiotics or discontinuation of antibiotics and continue supportive care. If the respiratory panel is positive for atypical bacterial infection (Bordetella pertussis, Chlamydophila pneumoniae, or Mycoplasma pneumoniae), consider antibiotic de-escalation to target atypical bacterial infection.            No radiology results from the last 24 hrs    Results for orders placed during the hospital encounter of 07/06/24    Adult Transthoracic Echo Complete With Contrast if Necessary Per Protocol    Interpretation Summary    Left ventricular systolic function is moderately decreased. Estimated left ventricular EF = 25% Left ventricular ejection fraction appears to be 21 - 25%.    The left ventricular cavity is borderline dilated.    The right ventricular cavity is borderline dilated.    The left atrial cavity is dilated.    Left atrial volume is mildly increased.      Current medications:  Scheduled Meds:bumetanide, 1 mg, Oral, BID  carvedilol, 12.5 mg, Oral, BID  empagliflozin, 10 mg, Oral, Daily  enoxaparin, 40 mg, Subcutaneous, Q12H  insulin glargine, 10 Units, Subcutaneous, Nightly  insulin lispro, 2-9 Units, Subcutaneous, 4x Daily AC & at Bedtime  isosorbide mononitrate, 30 mg, Oral, Q24H  losartan, 50 mg, Oral, Q24H  pantoprazole, 40 mg, Oral, Q AM  pharmacy consult - MTM, , Does not apply,  Daily  rosuvastatin, 10 mg, Oral, Nightly  sodium chloride, 10 mL, Intravenous, Q12H  spironolactone, 25 mg, Oral, Daily      Continuous Infusions:sodium chloride, 50 mL/hr, Last Rate: 50 mL/hr (07/08/24 1414)      PRN Meds:.  [DISCONTINUED] acetaminophen **OR** acetaminophen **OR** acetaminophen    acetaminophen    senna-docusate sodium **AND** polyethylene glycol **AND** bisacodyl **AND** bisacodyl    dextrose    dextrose    glucagon (human recombinant)    hydrOXYzine    melatonin    nitroglycerin    ondansetron ODT **OR** ondansetron    sodium chloride    sodium chloride    sodium chloride    Assessment & Plan   Assessment & Plan     Active Hospital Problems    Diagnosis  POA    **Acute exacerbation of CHF (congestive heart failure) [I50.9]  Unknown    Hypertensive urgency [I16.0]  Unknown    Hyponatremia [E87.1]  Unknown    CHF (congestive heart failure) [I50.9]  Yes    New onset of congestive heart failure [I50.9]  Unknown    Noncompliance with treatment plan [Z91.199]  Not Applicable    Hyperlipidemia [E78.5]  Yes    Uncontrolled type 2 diabetes mellitus with hyperglycemia [E11.65]  Yes    Panic attacks [F41.0]  Yes      Resolved Hospital Problems    Diagnosis Date Resolved POA    Primary hypertension [I10] 07/06/2024 Yes        Brief Hospital Course to date:  Michelle Weaver is a 59 y.o. female with PMHx of GERD, HTN, HLD, T2DM (dx 2022), LLE cellulitis / L toe ulcer (f/w LIDC, cx + group B strep and citrobacter, 9/2022) who presented to the ED for evaluation of shortness of breath and BLE edema.     This patient's problems and plans were partially entered by my partner and updated as appropriate by me 07/08/24.    Acute HFrEF  New cardiomyopathy   -Unclear etiology of cardiomyopathy, cardiac evaluation underway  -Echo 7/6 with EF 21 to 25%  -s/p lasix 80 mg IV in ED with improvement in symptoms  -Cardiology evaluated, planning for C on 7/8/2024. Further recommendations after cardiac catheterization.  Continue oral Bumex 1 mg BID, carvedilol, Jardiance, losartan, spironolactone, Imdur. Strict I's and O's, daily weights.    Hypertensive Urgency  Elevated troponin  -BP 200s/100s on admission  -previously on carvedilol and lisinopril, per patient discontinued due to ?hyponatremia  -Continue carvedilol, Imdur. Cardiology changed lisinopril to losartan, added spironolactone.     Hyponatremia, resolved   -Na 126, corrected sodium 129 on admission  -Monitor daily BMP with diuresis.     Uncontrolled T2DM with hyperglycemia  -glucose 310 on admission, A1c 11.60%  -Continue levemir 10 units nightly, moderate dose SSI, Jardiance. Monitor glucose closely and adjust insulin as needed.  -DM educator consulted.      Hyperlipidemia   -Continue rosuvastatin.      Panic attacks  -Atarax PRN.    Expected Discharge Location and Transportation: Home pending Cardiology recs  Expected Discharge   Expected Discharge Date: 7/9/2024; Expected Discharge Time:      VTE Prophylaxis:  Pharmacologic VTE prophylaxis orders are present.         AM-PAC 6 Clicks Score (PT): 24 (07/08/24 0800)    CODE STATUS:   Code Status and Medical Interventions:   Ordered at: 07/06/24 0610     Code Status (Patient has no pulse and is not breathing):    CPR (Attempt to Resuscitate)     Medical Interventions (Patient has pulse or is breathing):    Full Support       Marizol Velasco, DO  07/08/24

## 2024-07-08 NOTE — PLAN OF CARE
Goal Outcome Evaluation:           Progress: improving  Outcome Evaluation: Patient AOX4, RA, SR, up with standby assist to independent. Strict I/Os with daily weights. No C/O SOB or pain on shift. Towards end of shift patient requested something for indigestion. Atarax given for PRN anxiety. Patient on Consistent Carb diet. Possibly DC tomorrow after Lifevest visit patient.  Left Heart Cath completed today , Right radial site CDI with Gauze and Tegaderm in place. Neuro checks completed.

## 2024-07-09 ENCOUNTER — READMISSION MANAGEMENT (OUTPATIENT)
Dept: CALL CENTER | Facility: HOSPITAL | Age: 59
End: 2024-07-09
Payer: COMMERCIAL

## 2024-07-09 VITALS
TEMPERATURE: 98.8 F | DIASTOLIC BLOOD PRESSURE: 80 MMHG | WEIGHT: 271.36 LBS | RESPIRATION RATE: 18 BRPM | HEART RATE: 90 BPM | BODY MASS INDEX: 40.19 KG/M2 | HEIGHT: 69 IN | SYSTOLIC BLOOD PRESSURE: 141 MMHG | OXYGEN SATURATION: 95 %

## 2024-07-09 PROBLEM — I50.21 ACUTE HFREF (HEART FAILURE WITH REDUCED EJECTION FRACTION): Status: ACTIVE | Noted: 2024-07-09

## 2024-07-09 PROBLEM — I16.0 HYPERTENSIVE URGENCY: Status: RESOLVED | Noted: 2024-07-06 | Resolved: 2024-07-09

## 2024-07-09 LAB
ANION GAP SERPL CALCULATED.3IONS-SCNC: 8 MMOL/L (ref 5–15)
BUN SERPL-MCNC: 15 MG/DL (ref 8–23)
BUN/CREAT SERPL: 13.6 (ref 7–25)
CALCIUM SPEC-SCNC: 8.1 MG/DL (ref 8.6–10.5)
CHLORIDE SERPL-SCNC: 101 MMOL/L (ref 98–107)
CO2 SERPL-SCNC: 31 MMOL/L (ref 22–29)
CREAT SERPL-MCNC: 1.1 MG/DL (ref 0.57–1)
EGFRCR SERPLBLD CKD-EPI 2021: 57.6 ML/MIN/1.73
GLUCOSE BLDC GLUCOMTR-MCNC: 114 MG/DL (ref 70–130)
GLUCOSE BLDC GLUCOMTR-MCNC: 128 MG/DL (ref 70–130)
GLUCOSE SERPL-MCNC: 136 MG/DL (ref 65–99)
POTASSIUM SERPL-SCNC: 4.1 MMOL/L (ref 3.5–5.2)
SODIUM SERPL-SCNC: 140 MMOL/L (ref 136–145)

## 2024-07-09 PROCEDURE — 99239 HOSP IP/OBS DSCHRG MGMT >30: CPT | Performed by: NURSE PRACTITIONER

## 2024-07-09 PROCEDURE — G0378 HOSPITAL OBSERVATION PER HR: HCPCS

## 2024-07-09 PROCEDURE — 25010000002 ENOXAPARIN PER 10 MG: Performed by: INTERNAL MEDICINE

## 2024-07-09 PROCEDURE — 80048 BASIC METABOLIC PNL TOTAL CA: CPT | Performed by: STUDENT IN AN ORGANIZED HEALTH CARE EDUCATION/TRAINING PROGRAM

## 2024-07-09 PROCEDURE — 82948 REAGENT STRIP/BLOOD GLUCOSE: CPT

## 2024-07-09 RX ORDER — CARVEDILOL 12.5 MG/1
12.5 TABLET ORAL 2 TIMES DAILY
Qty: 60 TABLET | Refills: 0 | Status: SHIPPED | OUTPATIENT
Start: 2024-07-09 | End: 2024-07-12

## 2024-07-09 RX ORDER — ACETAMINOPHEN 325 MG/1
650 TABLET ORAL EVERY 4 HOURS PRN
Start: 2024-07-09

## 2024-07-09 RX ORDER — ISOSORBIDE MONONITRATE 30 MG/1
30 TABLET, EXTENDED RELEASE ORAL
Qty: 30 TABLET | Refills: 0 | Status: SHIPPED | OUTPATIENT
Start: 2024-07-10

## 2024-07-09 RX ORDER — BUMETANIDE 1 MG/1
1 TABLET ORAL 2 TIMES DAILY
Qty: 60 TABLET | Refills: 0 | Status: SHIPPED | OUTPATIENT
Start: 2024-07-09 | End: 2024-07-12

## 2024-07-09 RX ORDER — SPIRONOLACTONE 25 MG/1
25 TABLET ORAL DAILY
Qty: 30 TABLET | Refills: 0 | Status: SHIPPED | OUTPATIENT
Start: 2024-07-10

## 2024-07-09 RX ADMIN — SPIRONOLACTONE 25 MG: 25 TABLET ORAL at 08:54

## 2024-07-09 RX ADMIN — PANTOPRAZOLE SODIUM 40 MG: 40 TABLET, DELAYED RELEASE ORAL at 06:18

## 2024-07-09 RX ADMIN — ENOXAPARIN SODIUM 40 MG: 100 INJECTION SUBCUTANEOUS at 08:54

## 2024-07-09 RX ADMIN — Medication 10 ML: at 08:54

## 2024-07-09 RX ADMIN — HYDROXYZINE HYDROCHLORIDE 25 MG: 25 TABLET ORAL at 08:51

## 2024-07-09 RX ADMIN — BUMETANIDE 1 MG: 1 TABLET ORAL at 08:51

## 2024-07-09 RX ADMIN — ISOSORBIDE MONONITRATE 30 MG: 30 TABLET, EXTENDED RELEASE ORAL at 08:51

## 2024-07-09 RX ADMIN — CARVEDILOL 12.5 MG: 12.5 TABLET, FILM COATED ORAL at 08:52

## 2024-07-09 RX ADMIN — EMPAGLIFLOZIN 10 MG: 10 TABLET, FILM COATED ORAL at 08:51

## 2024-07-09 RX ADMIN — SACUBITRIL AND VALSARTAN 1 TABLET: 24; 26 TABLET, FILM COATED ORAL at 08:52

## 2024-07-09 NOTE — PROGRESS NOTES
"[facility]  IP Progress Note      Chief Complaint: acute HFref    Subjective   Dyspnea resolved. Denies chest pain. Ready to go home. She has not had her meds today  [facility] yesterday   Objective     Blood pressure 131/81, pulse 86, temperature 98.8 °F (37.1 °C), temperature source Oral, resp. rate 18, height 174 cm (68.5\"), weight 123 kg (271 lb 5.8 oz), SpO2 95%.     Intake/Output Summary (Last 24 hours) at 7/9/2024 0801  Last data filed at 7/9/2024 0600  Gross per 24 hour   Intake 600 ml   Output 3100 ml   Net -2500 ml       Physical Exam:  General: No acute distress.   Neck: no JVD.  Chest:No respiratory distress, breath sounds are normal. No wheezes,  rhonchi or rales.  Cardiovascular: Normal S1 and S2, no murmer, gallop or rub.    Extremities: No edema.    Results Review:     I reviewed the patient's new clinical results.  I personally viewed and interpreted the patient's EKG/Telemetry data    Results from last 7 days   Lab Units 07/06/24  0055   WBC 10*3/mm3 8.24   HEMOGLOBIN g/dL 11.1*   HEMATOCRIT % 38.2   PLATELETS 10*3/mm3 333     Results from last 7 days   Lab Units 07/09/24  0628 07/06/24  0819 07/06/24  0055   SODIUM mmol/L 140   < > 126*   POTASSIUM mmol/L 4.1   < > 4.3   CHLORIDE mmol/L 101   < > 90*   CO2 mmol/L 31.0*   < > 26.0   BUN mg/dL 15   < > 8   CREATININE mg/dL 1.10*   < > 0.91   CALCIUM mg/dL 8.1*   < > 8.2*   BILIRUBIN mg/dL  --   --  1.1   ALK PHOS U/L  --   --  96   ALT (SGPT) U/L  --   --  23   AST (SGOT) U/L  --   --  27   GLUCOSE mg/dL 136*   < > 310*    < > = values in this interval not displayed.         Results from last 7 days   Lab Units 07/06/24  0346   TSH uIU/mL 5.990*   FREE T4 ng/dL 1.42     Results from last 7 days   Lab Units 07/06/24  0346   CHOLESTEROL mg/dL 180   TRIGLYCERIDES mg/dL 150   HDL CHOL mg/dL 47   LDL CHOL mg/dL 107*         Iron   Date Value Ref Range Status   07/06/2024 26 (L) 37 - 145 mcg/dL Final     Ferritin   Date " Value Ref Range Status   07/06/2024 27.73 13.00 - 150.00 ng/mL Final     Iron Saturation (TSAT)   Date Value Ref Range Status   07/06/2024 6 (L) 20 - 50 % Final     TIBC   Date Value Ref Range Status   07/06/2024 407 298 - 536 mcg/dL Final      Hemoglobin A1C   Date Value Ref Range Status   07/06/2024 11.60 (H) 4.80 - 5.60 % Final         Tele: Sinus Rythym      Assessment:  Heart failure with reduced ejection fraction, stage C, NYHA stage IV to stage II now. EF 21-25% 7/2024  Uncontrolled diabetes, A1c 11.6  Hyperlipidemia,   Iron deficiency anemia s/p IV iron x 1  Nonobstructive CAD      Plan:  GDMT    Drug Class   Drug   Dose Last Dose Adjustment Notes   ACEi/ARB/ARNI Entresto  24/26mg BID Transitioned losartan 50mg to Entresto 7/9/24    Beta Blocker Carvedilol  12.5mg BID     MRA Spironolactone 25mg daily     SGLT2i Jardiance 10mg     Diuretic regimen Bumex 1mg BID       2. Discharge with LifeVest  3. Consider addition of semaglutide outpatient for diabetes  4. Continue crestor for hyperlipidemia  5. Check BMP, proBNP in one week  6. Follow up tomorrow in [facility]  for GDMT titration  7. Ok to discharge from a cardiology standpoint

## 2024-07-09 NOTE — TELEPHONE ENCOUNTER
I left a voicemail for patient to call back and schedule a hospital follow up. Please schedule - please relay.

## 2024-07-09 NOTE — OUTREACH NOTE
Prep Survey      Flowsheet Row Responses   [facility] facility patient discharged from? [place]   Is LACE score < 7 ? No   Eligibility [facility]   Date of Admission 07/06/24   Date of Discharge 07/09/24   Discharge Disposition Home or Self Care   Discharge diagnosis a/c CHF-Left heart cath this visit   Does the patient have one of the following disease processes/diagnoses(primary or secondary)? CHF   Does the patient have Home health ordered? No   Is there a DME ordered? No   Prep survey completed? Yes            PLACIDO A - Registered Nurse

## 2024-07-09 NOTE — DISCHARGE SUMMARY
[facility] Medicine Services  DISCHARGE SUMMARY    Patient Name: Michelle Weaver  : 1965  MRN: [id number]    Date of Admission: 2024 12:33 AM  Date of Discharge:  2024  Primary Care Physician: Milad Patterson PA-C    Consults       Date and Time Order Name Status Description    2024  6:10 AM Inpatient Cardiology Consult Completed             Hospital Course     Presenting Problem: Shortness of breath, edema    Active Hospital Problems    Diagnosis  POA    **Acute exacerbation of CHF (congestive heart failure) [I50.9]  Yes    Acute HFrEF (heart failure with reduced ejection fraction) [I50.21]  Yes    Hyponatremia [E87.1]  Yes    CHF (congestive heart failure) [I50.9]  Yes    New onset of congestive heart failure [I50.9]  Yes    Noncompliance with treatment plan [Z91.199]  Not Applicable    Hyperlipidemia [E78.5]  Yes    Uncontrolled type 2 diabetes mellitus with hyperglycemia [E11.65]  Yes    Panic attacks [F41.0]  Yes      Resolved Hospital Problems    Diagnosis Date Resolved POA    Hypertensive urgency [I16.0] 2024 Yes    Primary hypertension [I10] 2024 Yes          Hospital Course:  Michelle Weaver is a 59 y.o. female  with PMHx of GERD, HTN, HLD, T2DM (dx ), LLE cellulitis / L toe ulcer (f/w [facility], cx + group B strep and citrobacter, 2022) who presented to the ED for evaluation of shortness of breath and BLE edema.  Patient was noted to have hypertensive urgency with BP 200s/100s on admission and was started on a nitro drip. Echo revealed acute onset HFrEF with EF 21 to 25%.  proBNP was elevated at 9791.  She received Lasix 80 mg IV x 1 in ED with improvement in symptoms cardiology was consulted for further evaluation.  Patient underwent left heart cath on 2024 with Dr. Laughlin that revealed moderate to severe LAD/D1 disease and mild mitral regurgitation.  However, heart cath findings did not appear to explain patient's significantly reduced EF.   Patient will be followed closely in the heart and valve clinic for GDMT and continued assessment of cardiac function.      Acute HFrEF  New cardiomyopathy   -Unclear etiology of cardiomyopathy, cardiac evaluation underway  -Patient to follow closely with heart valve clinic with first appointment tomorrow, 7/10.  Patient discharged on GDMT including Entresto, carvedilol, spironolactone, Jardiance, Bumex.  -LifeVest to be placed at discharge.     Hypertensive Urgency  Elevated troponin  -s/p nitro gtt  -previously on carvedilol and lisinopril, per patient discontinued due to ?hyponatremia  -Continue carvedilol, Imdur. Cardiology changed lisinopril to losartan, added spironolactone.     Hyponatremia, resolved   -Na 126, corrected sodium 129 on admission  -Na 140 on day of discharge, CTM with labs at H&V clinic     Uncontrolled T2DM with hyperglycemia  -glucose 310 on admission, A1c 11.60%, poor control  -On Toujeo at home. Reports she was unable to tolerated metformin.  -Continue home medications at discharge.  Will need close follow-up with PCP for further titration of antihyperglycemics.  Will also make referral to endocrinology at discharge.  -DM educator consulted.      Hyperlipidemia   -Continue rosuvastatin.      Panic attacks  -Atarax PRN.      Discharge Follow Up Recommendations for outpatient labs/diagnostics:      Day of Discharge     HPI:   Patient resting in bed. Says she is feeling tired but overall okay. Is anxious about her new diagnosis of acute CHF. Is planning to follow up in Heart and Valve Clinic tomorrow. Waiting on LifeVest to be delivered.    Vital Signs:   Temp:  [98.8 °F (37.1 °C)-99 °F (37.2 °C)] 98.8 °F (37.1 °C)  Heart Rate:  [80-94] 90  Resp:  [18] 18  BP: (110-148)/(60-90) 141/80      Physical Exam:  Constitutional: No acute distress, awake, alert  HENT: NCAT, mucous membranes moist  Respiratory: Clear to auscultation bilaterally, respiratory effort normal, room air  Cardiovascular: RRR,  no murmurs, rubs, or gallops  Gastrointestinal: Positive bowel sounds, soft, nontender, nondistended  Musculoskeletal: No bilateral ankle edema  Psychiatric: Appropriate affect, cooperative  Neurologic: Oriented x 3, strength symmetric in all extremities, speech clear  Skin: No rashes      Pertinent  and/or Most Recent Results     LAB RESULTS:      Lab 07/06/24  0055   WBC 8.24   HEMOGLOBIN 11.1*   HEMATOCRIT 38.2   PLATELETS 333   NEUTROS ABS 5.30   IMMATURE GRANS (ABS) 0.03   LYMPHS ABS 2.24   MONOS ABS 0.56   EOS ABS 0.07   MCV 78.3*         Lab 07/09/24  0628 07/08/24  0958 07/06/24  2034 07/06/24  1532 07/06/24  1145 07/06/24  0819 07/06/24  0346 07/06/24  0055   SODIUM 140 143 136 138 137   < >  --  126*   POTASSIUM 4.1 3.9  --   --   --   --   --  4.3   CHLORIDE 101 100  --   --   --   --   --  90*   CO2 31.0* 36.0*  --   --   --   --   --  26.0   ANION GAP 8.0 7.0  --   --   --   --   --  10.0   BUN 15 16  --   --   --   --   --  8   CREATININE 1.10* 1.10*  --   --   --   --   --  0.91   EGFR 57.6* 57.6*  --   --   --   --   --  72.4   GLUCOSE 136* 141*  --   --   --   --   --  310*   CALCIUM 8.1* 8.5*  --   --   --   --   --  8.2*   HEMOGLOBIN A1C  --   --   --   --   --   --   --  11.60*   TSH  --   --   --   --   --   --  5.990*  --     < > = values in this interval not displayed.         Lab 07/06/24 0055   TOTAL PROTEIN 6.5   ALBUMIN 3.2*   GLOBULIN 3.3   ALT (SGPT) 23   AST (SGOT) 27   BILIRUBIN 1.1   ALK PHOS 96   LIPASE 17         Lab 07/06/24 0346 07/06/24  0055   PROBNP  --  9,791.0*   HSTROP T 36* 40*         Lab 07/06/24  0346   CHOLESTEROL 180   LDL CHOL 107*   HDL CHOL 47   TRIGLYCERIDES 150         Lab 07/06/24  0346 07/06/24  0055   IRON 26*  --    IRON SATURATION (TSAT) 6*  --    TIBC 407  --    TRANSFERRIN 273  --    FERRITIN 27.73  --    FOLATE  --  15.40   VITAMIN B 12  --  474         Brief Urine Lab Results       None          Microbiology Results (last 10 days)       Procedure  Component Value - Date/Time    COVID PRE-OP / PRE-PROCEDURE SCREENING ORDER (NO ISOLATION) - Swab, Nasopharynx [[id number]]  (Normal) Collected: 07/06/24 0052    Lab Status: Final result Specimen: Swab from Nasopharynx Updated: 07/06/24 0152    Narrative:      The following orders were created for panel order COVID PRE-OP / PRE-PROCEDURE SCREENING ORDER (NO ISOLATION) - Swab, Nasopharynx.  Procedure                               Abnormality         Status                     ---------                               -----------         ------                     Respiratory Panel PCR w/...[[id number]]  Normal              Final result                 Please view results for these tests on the individual orders.    Respiratory Panel PCR w/COVID-19(SARS-CoV-2) [facility] In-House, NP Swab in UTM/VTM, 2 HR TAT - Swab, Nasopharynx [[id number]]  (Normal) Collected: 07/06/24 0052    Lab Status: Final result Specimen: Swab from Nasopharynx Updated: 07/06/24 0152     ADENOVIRUS, PCR Not Detected     Coronavirus 229E Not Detected     Coronavirus HKU1 Not Detected     Coronavirus NL63 Not Detected     Coronavirus OC43 Not Detected     COVID19 Not Detected     Human Metapneumovirus Not Detected     Human Rhinovirus/Enterovirus Not Detected     Influenza A PCR Not Detected     Influenza B PCR Not Detected     Parainfluenza Virus 1 Not Detected     Parainfluenza Virus 2 Not Detected     Parainfluenza Virus 3 Not Detected     Parainfluenza Virus 4 Not Detected     RSV, PCR Not Detected     Bordetella pertussis pcr Not Detected     Bordetella parapertussis PCR Not Detected     Chlamydophila pneumoniae PCR Not Detected     Mycoplasma pneumo by PCR Not Detected    Narrative:      In the setting of a positive respiratory panel with a viral infection PLUS a negative procalcitonin without other underlying concern for bacterial infection, consider observing off antibiotics or discontinuation of antibiotics and continue  supportive care. If the respiratory panel is positive for atypical bacterial infection (Bordetella pertussis, Chlamydophila pneumoniae, or Mycoplasma pneumoniae), consider antibiotic de-escalation to target atypical bacterial infection.            Cardiac Catheterization/Vascular Study    Result Date: 7/9/2024    Coronary artery disease The patient has diagnostic for ischemia but also has elevated LVEDP.  Low EF is out of proportion to the coronary artery disease.  We will keep titrating her medications. We will repeat her echo in 6 weeks.  Will reevaluate revascularization options once we have seen where her EF is. She also will need sleep study before that.     Adult Transthoracic Echo Complete With Contrast if Necessary Per Protocol    Result Date: 7/6/2024    Left ventricular systolic function is moderately decreased. Estimated left ventricular EF = 25% Left ventricular ejection fraction appears to be 21 - 25%.   The left ventricular cavity is borderline dilated.   The right ventricular cavity is borderline dilated.   The left atrial cavity is dilated.   Left atrial volume is mildly increased.     XR Chest 1 View    Result Date: 7/6/2024  XR CHEST 1 VW Date of Exam: 7/6/2024 12:40 AM EDT Indication: SOA triage protocol Comparison: 6/4/2018. Findings: Limited lordotic view. There appear to be new bibasilar patchy airspace opacities, left greater than right. Cardiac silhouette is enlarged, which could be artifact. Pulmonary vasculature is unremarkable. No pneumothorax or definite pleural effusion.     Impression: Limited lordotic view with new bibasilar airspace disease, left greater than right. Electronically Signed: Leonides Vasquez MD  7/6/2024 1:02 AM EDT  Workstation ID: [id number]     Results for orders placed during the hospital encounter of 09/05/22    Doppler Arterial Multi Level Lower Extremity - Bilateral CAR    Interpretation Summary  · Normal waveforms and AMI's bilaterally.      Results for orders placed  during the hospital encounter of 09/05/22    Doppler Arterial Multi Level Lower Extremity - Bilateral CAR    Interpretation Summary  · Normal waveforms and AMI's bilaterally.      Results for orders placed during the hospital encounter of 07/06/24    Adult Transthoracic Echo Complete With Contrast if Necessary Per Protocol    Interpretation Summary    Left ventricular systolic function is moderately decreased. Estimated left ventricular EF = 25% Left ventricular ejection fraction appears to be 21 - 25%.    The left ventricular cavity is borderline dilated.    The right ventricular cavity is borderline dilated.    The left atrial cavity is dilated.    Left atrial volume is mildly increased.      Plan for Follow-up of Pending Labs/Results:     Discharge Details        Discharge Medications        New Medications        Instructions Start Date   acetaminophen 325 MG tablet  Commonly known as: TYLENOL   650 mg, Oral, Every 4 Hours PRN      bumetanide 1 MG tablet  Commonly known as: BUMEX   1 mg, Oral, 2 Times Daily      Entresto 24-26 MG tablet  Generic drug: sacubitril-valsartan   1 tablet, Oral, Every 12 Hours Scheduled      isosorbide mononitrate 30 MG 24 hr tablet  Commonly known as: IMDUR   30 mg, Oral, Every 24 Hours Scheduled   Start Date: July 10, 2024     Jardiance 10 MG tablet tablet  Generic drug: empagliflozin   10 mg, Oral, Daily   Start Date: July 10, 2024     pharmacy consult - MTM   Does not apply, Daily      spironolactone 25 MG tablet  Commonly known as: ALDACTONE   25 mg, Oral, Daily   Start Date: July 10, 2024            Continue These Medications        Instructions Start Date   carvedilol 12.5 MG tablet  Commonly known as: COREG   12.5 mg, Oral, 2 Times Daily      Contour Next One device   Use twice daily as directed to check blood sugar.      Contour Next Test test strip  Generic drug: glucose blood   Use as directed to check blood sugar twice daily      esomeprazole 40 MG capsule  Commonly known  as: nexIUM   40 mg, Oral, Every Morning Before Breakfast      Microlet Lancets misc   Use as directed to check blood sugar twice daily      Pen Needles 31G X 5 MM misc   1 each, Does not apply, Daily, Use to inject insulin once daily as directed      rosuvastatin 10 MG tablet  Commonly known as: Crestor   10 mg, Oral, Nightly      Toujeo Max SoloStar 300 UNIT/ML solution pen-injector injection  Generic drug: Insulin Glargine (2 Unit Dial)   10 Units, Subcutaneous, Nightly             Stop These Medications      lisinopril 20 MG tablet  Commonly known as: PRINIVIL,ZESTRIL     metFORMIN  MG 24 hr tablet  Commonly known as: GLUCOPHAGE-XR              Allergies   Allergen Reactions    Sulfa Antibiotics Rash         Discharge Disposition:  Home or Self Care    Diet:  Hospital:  Diet Order   Procedures    Diet: Cardiac; Healthy Heart (2-3 Na+); Fluid Consistency: Thin (IDDSI 0)       Diet Instructions       Diet: Cardiac Diets, Diabetic Diets; Healthy Heart (2-3 Na+); Regular (IDDSI 7); Thin (IDDSI 0); Consistent Carbohydrate      Discharge Diet:  Cardiac Diets  Diabetic Diets       Cardiac Diet: Healthy Heart (2-3 Na+)    Texture: Regular (IDDSI 7)    Fluid Consistency: Thin (IDDSI 0)    Diabetic Diet: Consistent Carbohydrate             Activity:  Activity Instructions       Activity as Tolerated              Restrictions or Other Recommendations:         CODE STATUS:    Code Status and Medical Interventions:   Ordered at: 07/06/24 0610     Code Status (Patient has no pulse and is not breathing):    CPR (Attempt to Resuscitate)     Medical Interventions (Patient has pulse or is breathing):    Full Support       Future Appointments   Date Time Provider Department Center   7/10/2024  9:45 AM Aixa Brizuela APRN [facility]   7/11/2024 11:00 AM Milad Patterson PA-C [facility]   8/8/2024  8:45 AM Geremias Laughlin MD [facility]       Additional Instructions for the Follow-ups that You Need to Schedule        Ambulatory Referral to Sleep Medicine   As directed      First available. Patient hospitalized with acute CHF.    Order Comments: First available. Patient hospitalized with acute CHF.    Follow-up needed: Yes        Discharge Follow-up with PCP   As directed       Currently Documented PCP:    Milad Patterson PA-C    PCP Phone Number:    [phone]     Follow Up Details: One week        Discharge Follow-up with Specified Provider: Dr. Laughlin with Cardiology; 1 Month   As directed      To: Dr. Laughlin with Cardiology   Follow Up: 1 Month   Follow Up Details: 4-6 weeks        Discharge Follow-up with Specified Provider: DARLENE Nicholson in [facility]; 1 Day   As directed      To: DARLENE Nicholson in [facility]   Follow Up: 1 Day                  DARLENE Ríos  07/09/24      Time Spent on Discharge:  I spent  50  minutes on this discharge activity which included: face-to-face encounter with the patient, reviewing the data in the system, coordination of the care with the nursing staff as well as consultants, documentation, and entering orders.

## 2024-07-10 ENCOUNTER — OFFICE VISIT (OUTPATIENT)
Dept: CARDIOLOGY | Facility: HOSPITAL | Age: 59
End: 2024-07-10
Payer: COMMERCIAL

## 2024-07-10 ENCOUNTER — TRANSITIONAL CARE MANAGEMENT TELEPHONE ENCOUNTER (OUTPATIENT)
Dept: CALL CENTER | Facility: HOSPITAL | Age: 59
End: 2024-07-10
Payer: COMMERCIAL

## 2024-07-10 VITALS
DIASTOLIC BLOOD PRESSURE: 59 MMHG | TEMPERATURE: 98 F | WEIGHT: 253 LBS | BODY MASS INDEX: 37.47 KG/M2 | RESPIRATION RATE: 18 BRPM | OXYGEN SATURATION: 95 % | SYSTOLIC BLOOD PRESSURE: 104 MMHG | HEIGHT: 69 IN | HEART RATE: 71 BPM

## 2024-07-10 DIAGNOSIS — I50.22 CHRONIC HFREF (HEART FAILURE WITH REDUCED EJECTION FRACTION): Primary | ICD-10-CM

## 2024-07-10 DIAGNOSIS — I25.10 CORONARY ARTERY DISEASE INVOLVING NATIVE CORONARY ARTERY OF NATIVE HEART WITHOUT ANGINA PECTORIS: ICD-10-CM

## 2024-07-10 NOTE — PROGRESS NOTES
Chief Complaint  No chief complaint on file.    Subjective    History of Present Illness {CC  Problem List  Visit  Diagnosis   Encounters  Notes  Medications  Labs  Result Review Imaging  Media :23}       History of Present Illness     Objective     Vital Signs:   There were no vitals filed for this visit.  There is no height or weight on file to calculate BMI.  Physical Exam         Result Review  Data Reviewed:{ Labs  Result Review  Imaging  Med Tab  Media :23}     {Data reviewed (Optional):39652}            Assessment and Plan {CC Problem List  Visit Diagnosis  ROS  Review (Popup)  Health Maintenance  Quality  BestPractice  Medications  SmartSets  SnapShot Encounters  Media :23}   There are no diagnoses linked to this encounter.    {Time Spent (Optional):51650}    Follow Up {Instructions Charge Capture  Follow-up Communications :23}   No follow-ups on file.    Patient was given instructions and counseling regarding her condition or for health maintenance advice. Please see specific information pulled into the AVS if appropriate.  Patient was instructed to call the [facility] Center with any questions, concerns, or worsening symptoms.

## 2024-07-10 NOTE — PROGRESS NOTES
"[facility]  Heart Failure Clinic    Cardiologist/EP: Dr Laughlin   PCP: Milad Patterson PA-C        Chief Complaint  Congestive Heart Failure    PROBLEM LIST:  CARDIAC  Coronary Artery Disease:   LHC, 7/8/2024: Moderate to severe LAD/D1 disease                 Myocardium:   Echo, 7/6/2024: EF 20 to 25%                Valvular:   Mild MR                Electrical:   Normal sinus rhythm        Pericardium:         Normal       Subjective    History of Present Illness  59-year-old female presents the office today for ongoing evaluation of her newly diagnosed HFrEF. Recently hospitalized at [facility] with shortness of air.  Patient has a history of GERD, hypertension, hypertension lipidemia, type 2 diabetes, left lower extremity cellulitis/left toe ulcer (follows with [facility], cultures positive for group B strep and citrobacter 9/22).  Echo showed an EF of 21 to 25% proBNP was elevated at 9791.  Patient also had hypertensive urgency with blood pressures 200s over 100s on  admission.Underwent left heart cath that showed moderate to severe LAD and D1 disease and mild mitral regurgitation.  Patient placed on LifeVest prior to discharge from the hospital.          Dyspnea: mild   Lower extremity swelling: none  Abdominal swelling: none   Home weight: Current weight is 253 lbs   Home BP/HRs: 100-110s  Orthopnea/pillows none   Last hospital stay: July 2024   Cardiac rehab: referral placed 7/10        Objective     Vital Signs:   Vitals:    07/10/24 0950 07/10/24 0953   BP: 104/58 104/59   BP Location: Left arm Left arm   Patient Position: Standing Sitting   Cuff Size: Adult Adult   Pulse: 75 71   Resp:  18   Temp:  98 °F (36.7 °C)   TempSrc:  Temporal   SpO2: 95% 95%   Weight:  115 kg (253 lb)   Height:  174 cm (68.5\")     Body mass index is 37.91 kg/m².  Physical Exam  Vitals and nursing note reviewed.   Constitutional:       Appearance: Normal appearance.   HENT:      Head: Normocephalic.   Eyes: "      Pupils: Pupils are equal, round, and reactive to light.   Cardiovascular:      Rate and Rhythm: Normal rate and regular rhythm.      Pulses: Normal pulses.      Heart sounds: Normal heart sounds. No murmur heard.     Comments: Life vest in place   Pulmonary:      Effort: Pulmonary effort is normal.      Breath sounds: Normal breath sounds.   Abdominal:      General: Bowel sounds are normal.      Palpations: Abdomen is soft.   Musculoskeletal:         General: Normal range of motion.      Cervical back: Normal range of motion.      Right lower leg: No edema.      Left lower leg: No edema.   Skin:     General: Skin is warm and dry.      Capillary Refill: Capillary refill takes less than 2 seconds.   Neurological:      Mental Status: She is alert and oriented to person, place, and time.   Psychiatric:         Mood and Affect: Mood normal.         Thought Content: Thought content normal.              Data Reviewed:  Lab Results   Component Value Date    GLUCOSE 136 (H) 07/09/2024    CALCIUM 8.1 (L) 07/09/2024     07/09/2024    K 4.1 07/09/2024    CO2 31.0 (H) 07/09/2024     07/09/2024    BUN 15 07/09/2024    CREATININE 1.10 (H) 07/09/2024    EGFR 57.6 (L) 07/09/2024    BCR 13.6 07/09/2024    ANIONGAP 8.0 07/09/2024     Lab Results   Component Value Date    WBC 8.24 07/06/2024    HGB 11.1 (L) 07/06/2024    HCT 38.2 07/06/2024    MCV 78.3 (L) 07/06/2024     07/06/2024                   Assessment and Plan   Acute/Acute on Chronic/Chronic HFrEF  - LVEF 21-25 %, NYHA class IIACC/AHA stage: C  - yes Euvolemic on exam  - Device life vest   - GDMT:    Drug Class   Drug   Dose Last Dose Adjustment Notes   ACEi/ARB/ARNI entresto 24/26 7/9/24    Beta Blocker Coreg  12.5  7/9/24    MRA Aldactone  25 7/9/24    SGLT2i Jardiance  10 7/9/24    Diuretic regimen Bumex  1 mg bid 7/9/24    Secondary therapy        - Heart failure education provided today including signs and symptoms, causes of heart failure,  medications, daily weights, low sodium diet (less than 1500 mg per day), 1.5L fluid restriction and daily physical activity as tolerated. Reviewed HF Zones with patient and family. Reinforced reasons to call and diuretic plan.     1. Chronic HFrEF (heart failure with reduced ejection fraction)  Continue Entresto, carvedilol, Aldactone, Jardiance, Bumex  Continue LifeVest  Will consider ZOLL heart failure patch at next office visit  - Ambulatory Referral to Cardiac Rehab    2. Coronary artery disease involving native coronary artery of native heart without angina pectoris    Stable on coreg, crestor,            Follow Up {Instructions Charge Capture  Follow-up Communications :23}   Return in about 6 days (around 7/16/2024) for Office visit, HF.    Patient was given instructions and counseling regarding her condition or for health maintenance advice. Please see specific information pulled into the AVS if appropriate.  Advised to call the [facility] with any questions, concerns, or worsening symptoms.

## 2024-07-10 NOTE — OUTREACH NOTE
Call Center TCM Note      Flowsheet Row Responses   [facility] patient discharged from? [place]   Does the patient have one of the following disease processes/diagnoses(primary or secondary)? CHF   TCM attempt successful? Yes  [No one on VR]   Call start time 0852   Call end time 0856   Discharge diagnosis a/c CHF-Left heart cath this visit   Person spoke with today (if not patient) and relationship  with Pt in background   Meds reviewed with patient/caregiver? Yes   Is the patient having any side effects they believe may be caused by any medication additions or changes? No   Does the patient have all medications ordered at discharge? Yes   Is the patient taking all medications as directed (includes completed medication regime)? Yes   Comments HOSP DC FU appt 7/11/24 11 am   Does the patient have an appointment with their PCP within 7-14 days of discharge? Yes   Has home health visited the patient within 72 hours of discharge? N/A   Pulse Ox monitoring None   Psychosocial issues? No   Did the patient receive a copy of their discharge instructions? Yes   Nursing interventions Reviewed instructions with patient   What is the patient's perception of their health status since discharge? Improving   Nursing interventions Nurse provided patient education   Is the patient able to teach back signs and symptoms of worsening condition? (i.e. weight gain, shortness of air, etc.) Yes   Is the patient/caregiver able to teach back the hierarchy of who to call/visit for symptoms/problems? PCP, Specialist, Home health nurse, Urgent Care, ED, 911 Yes   TCM call completed? Yes   Wrap up additional comments spoke with  and Pt  discussed s/s of chf and getting wts daily. Pt has Cardio appt this am and was getting ready for appt - call short.   Call end time 0856             Ana Ritchie RN    7/10/2024, 08:58 EDT

## 2024-07-11 ENCOUNTER — TELEPHONE (OUTPATIENT)
Dept: CARDIAC REHAB | Facility: HOSPITAL | Age: 59
End: 2024-07-11
Payer: COMMERCIAL

## 2024-07-11 ENCOUNTER — DOCUMENTATION (OUTPATIENT)
Dept: CARDIAC REHAB | Facility: HOSPITAL | Age: 59
End: 2024-07-11
Payer: COMMERCIAL

## 2024-07-12 ENCOUNTER — OFFICE VISIT (OUTPATIENT)
Dept: FAMILY MEDICINE CLINIC | Facility: CLINIC | Age: 59
End: 2024-07-12
Payer: COMMERCIAL

## 2024-07-12 VITALS
WEIGHT: 241.2 LBS | HEART RATE: 75 BPM | OXYGEN SATURATION: 94 % | BODY MASS INDEX: 35.73 KG/M2 | SYSTOLIC BLOOD PRESSURE: 122 MMHG | DIASTOLIC BLOOD PRESSURE: 70 MMHG | HEIGHT: 69 IN

## 2024-07-12 DIAGNOSIS — I25.10 CORONARY ARTERY DISEASE INVOLVING NATIVE CORONARY ARTERY OF NATIVE HEART WITHOUT ANGINA PECTORIS: ICD-10-CM

## 2024-07-12 DIAGNOSIS — E11.65 UNCONTROLLED TYPE 2 DIABETES MELLITUS WITH HYPERGLYCEMIA: ICD-10-CM

## 2024-07-12 DIAGNOSIS — I50.21 ACUTE HFREF (HEART FAILURE WITH REDUCED EJECTION FRACTION): Primary | ICD-10-CM

## 2024-07-12 DIAGNOSIS — R79.89 ELEVATED TSH: ICD-10-CM

## 2024-07-12 DIAGNOSIS — I95.9 SYMPTOMATIC HYPOTENSION: ICD-10-CM

## 2024-07-12 DIAGNOSIS — D50.9 IRON DEFICIENCY ANEMIA, UNSPECIFIED IRON DEFICIENCY ANEMIA TYPE: ICD-10-CM

## 2024-07-12 DIAGNOSIS — E78.5 HYPERLIPIDEMIA, UNSPECIFIED HYPERLIPIDEMIA TYPE: ICD-10-CM

## 2024-07-12 DIAGNOSIS — F41.9 ANXIETY: Chronic | ICD-10-CM

## 2024-07-12 LAB
EXPIRATION DATE: NORMAL
Lab: NORMAL
POC CREATININE URINE: 50
POC MICROALBUMIN URINE: 150

## 2024-07-12 PROCEDURE — 99495 TRANSJ CARE MGMT MOD F2F 14D: CPT | Performed by: PHYSICIAN ASSISTANT

## 2024-07-12 PROCEDURE — 82044 UR ALBUMIN SEMIQUANTITATIVE: CPT | Performed by: PHYSICIAN ASSISTANT

## 2024-07-12 RX ORDER — ROSUVASTATIN CALCIUM 10 MG/1
10 TABLET, COATED ORAL NIGHTLY
Qty: 90 TABLET | Refills: 3 | Status: SHIPPED | OUTPATIENT
Start: 2024-07-12

## 2024-07-12 RX ORDER — CARVEDILOL 6.25 MG/1
6.25 TABLET ORAL 2 TIMES DAILY
Qty: 60 TABLET | Refills: 0 | Status: SHIPPED | OUTPATIENT
Start: 2024-07-12

## 2024-07-12 RX ORDER — INSULIN GLARGINE 300 U/ML
10 INJECTION, SOLUTION SUBCUTANEOUS NIGHTLY
Qty: 3 ML | Refills: 0 | Status: SHIPPED | OUTPATIENT
Start: 2024-07-12

## 2024-07-12 RX ORDER — HYDROXYZINE HYDROCHLORIDE 25 MG/1
25 TABLET, FILM COATED ORAL 3 TIMES DAILY PRN
Qty: 60 TABLET | Refills: 5 | Status: SHIPPED | OUTPATIENT
Start: 2024-07-12

## 2024-07-12 RX ORDER — BUMETANIDE 1 MG/1
1 TABLET ORAL DAILY
Qty: 30 TABLET | Refills: 0 | Status: SHIPPED | OUTPATIENT
Start: 2024-07-12

## 2024-07-12 RX ORDER — PEN NEEDLE, DIABETIC 30 GX3/16"
1 NEEDLE, DISPOSABLE MISCELLANEOUS DAILY
Qty: 100 EACH | Refills: 3 | Status: SHIPPED | OUTPATIENT
Start: 2024-07-12

## 2024-07-12 NOTE — PROGRESS NOTES
Transitional Care Follow Up Visit  Subjective     Michelle Weaver is a 59 y.o. female who presents for a transitional care management visit.    Within 48 business hours after discharge our office contacted her via telephone to coordinate her care and needs.      I reviewed and discussed the details of that call along with the discharge summary, hospital problems, inpatient lab results, inpatient diagnostic studies, and consultation reports with Michelle.     Current outpatient and discharge medications have been reconciled for the patient.  Reviewed by: Milad Patterson PA-C          7/9/2024     6:10 PM   Date of TCM Phone Call   [facility]   Date of Admission 7/6/2024   Date of Discharge 7/9/2024   Discharge Disposition Home or Self Care     Risk for Readmission (LACE) Score: 8 (7/9/2024  6:00 AM)      History of Present Illness   Course During Hospital Stay:    Michelle Weaver is a 59 y.o. female  with PMHx of GERD, HTN, HLD, T2DM (dx 2022), LLE cellulitis / L toe ulcer (f/w [facility], cx + group B strep and citrobacter, 9/2022) who presented to the ED for evaluation of shortness of breath and BLE edema.  Patient was noted to have hypertensive urgency with BP 200s/100s on admission and was started on a nitro drip. Echo revealed acute onset HFrEF with EF 21 to 25%.  proBNP was elevated at 9791.  She received Lasix 80 mg IV x 1 in ED with improvement in symptoms cardiology was consulted for further evaluation.  Patient underwent left heart cath on 7/8/2024 with Dr. Laughlin that revealed moderate to severe LAD/D1 disease and mild mitral regurgitation.  However, heart cath findings did not appear to explain patient's significantly reduced EF.  Patient will be followed closely in the heart and valve clinic for GDMT and continued assessment of cardiac function.      Acute HFrEF  New cardiomyopathy   -Unclear etiology of cardiomyopathy, cardiac evaluation underway  -Patient to follow closely with heart valve clinic  with first appointment tomorrow, 7/10.  Patient discharged on GDMT including Entresto, carvedilol, spironolactone, Jardiance, Bumex.  -LifeVest to be placed at discharge.     Hypertensive Urgency  Elevated troponin  -s/p nitro gtt  -previously on carvedilol and lisinopril, per patient discontinued due to ?hyponatremia  -Continue carvedilol, Imdur. Cardiology changed lisinopril to losartan, added spironolactone.     Hyponatremia, resolved   -Na 126, corrected sodium 129 on admission  -Na 140 on day of discharge, CTM with labs at H&V clinic     Uncontrolled T2DM with hyperglycemia  -glucose 310 on admission, A1c 11.60%, poor control  -On Toujeo at home. Reports she was unable to tolerated metformin.  -Continue home medications at discharge.  Will need close follow-up with PCP for further titration of antihyperglycemics.  Will also make referral to endocrinology at discharge.  -DM educator consulted.      Hyperlipidemia   -Continue rosuvastatin.      Panic attacks  -Atarax PRN.        7/12/24: Meds make her feel woozy after taking them in the morning. Took them about 1 hour before her appointment today. Cardiology told her at her appointment 2 days ago this was related to starting entresto at discharge and it would take a while for her to adjust. She still has dyspnea but this is overall improved from admission. She is down 12 pounds in 2 days and does report feeling like she has lost a lot of fluid. Denies chest pain, syncope. She states this morning her BP was 90/50 before her medications then it went to 120/59 on recheck about 10 minutes later. She has follow-up scheduled next week with the heart and valve clinic.     She states Toujeo didn't work for her. Sugars were always in the 200s. Would try to fast and watch what she was eating. Got comfortable and went back to old habits. She is not using Toujeo at home currently. Since she has been home they went right to the 200s again. Were better during admission.  "    She would like to have the hydroxyzine she had during admission to help with her nerves.     The following portions of the patient's history were reviewed and updated as appropriate: allergies, current medications, past family history, past medical history, past social history, past surgical history, and problem list.    Review of Systems   Respiratory:  Positive for shortness of breath.    Cardiovascular:  Negative for chest pain and leg swelling.   Neurological:  Positive for dizziness and light-headedness. Negative for syncope.       Objective     Vitals:    07/12/24 0818   BP: 122/70   Pulse: 75   SpO2: 94%   Weight: 109 kg (241 lb 3.2 oz)   Height: 174 cm (68.5\")     Physical Exam  Vitals reviewed.   Constitutional:       General: She is not in acute distress.     Appearance: She is well-developed.      Comments: Lies down on exam table due to lightheadedness. Wearing LifeVest.    HENT:      Head: Normocephalic and atraumatic.   Eyes:      Conjunctiva/sclera: Conjunctivae normal.   Cardiovascular:      Rate and Rhythm: Normal rate and regular rhythm.      Pulses:           Radial pulses are 1+ on the right side and 1+ on the left side.      Heart sounds: Normal heart sounds. No murmur heard.  Pulmonary:      Effort: Pulmonary effort is normal.      Breath sounds: Normal breath sounds.   Musculoskeletal:      Cervical back: Normal range of motion.      Right lower leg: No edema.      Left lower leg: No edema.   Skin:     General: Skin is warm and dry.   Neurological:      Mental Status: She is alert.      Gait: Gait normal.   Psychiatric:         Speech: Speech normal.         Behavior: Behavior normal.         Results    Results for orders placed or performed in visit on 07/12/24   POCT microalbumin    Specimen: Urine   Result Value Ref Range    Microalbumin, Urine 150     Creatinine, Urine 50     Lot Number [id number]     Expiration Date 08/14/2025        Assessment & Plan   Diagnoses and all orders for " this visit:    1. Acute HFrEF (heart failure with reduced ejection fraction) (Primary)    2. Hypertension, unspecified type    3. Coronary artery disease involving native coronary artery of native heart without angina pectoris    4. Uncontrolled type 2 diabetes mellitus with hyperglycemia  -     POCT microalbumin    5. Anxiety    6. Iron deficiency anemia, unspecified iron deficiency anemia type    7. Elevated TSH    8. Hyperlipidemia, unspecified hyperlipidemia type  -     rosuvastatin (Crestor) 10 MG tablet; Take 1 tablet by mouth Every Night.  Dispense: 90 tablet; Refill: 3    Other orders  -     hydrOXYzine (ATARAX) 25 MG tablet; Take 1 tablet by mouth 3 (Three) Times a Day As Needed for Anxiety.  Dispense: 60 tablet; Refill: 5  -     carvedilol (COREG) 6.25 MG tablet; Take 1 tablet by mouth 2 (Two) Times a Day.  Dispense: 60 tablet; Refill: 0  -     bumetanide (BUMEX) 1 MG tablet; Take 1 tablet by mouth Daily.  Dispense: 30 tablet; Refill: 0      -BP 70s systolic for me on repeat x2. I spoke with DARLENE Murphy in the heart and valve clinic who advised reducing bumex to 1 mg qd and decreasing carvedilol to 6.25 mg bid. Counseled the patient to increase fluid intake this morning, monitor blood pressures closely today, and report persistent BP <100/60 to her cardiologist. Aixa indicated they may have the patient hold Entresto over the weekend if she is still hypotensive. I advised the patient to present to the ER with syncope. Follow-up as scheduled with the heart and valve clinic on Tuesday next week.   -Resume Toujeo 10 units nightly for hyperglycemia. Will add Mounjaro 2.5 mg weekly but hold off on starting for 1 week until BP improves to avoid dehydration.   -Microcytic anemia, Hgb 11.1 during admission, iron indices low. Pt reports last colonoscopy was about 5 years ago with Dr. Haley.  -TSH was elevated at 5, free T4 normal.   -RTC in 1 month for f/u on diabetes, anemia, hypothyroidism.

## 2024-07-15 ENCOUNTER — TELEPHONE (OUTPATIENT)
Dept: CARDIOLOGY | Facility: CLINIC | Age: 59
End: 2024-07-15
Payer: COMMERCIAL

## 2024-07-16 ENCOUNTER — OFFICE VISIT (OUTPATIENT)
Dept: CARDIOLOGY | Facility: HOSPITAL | Age: 59
End: 2024-07-16
Payer: COMMERCIAL

## 2024-07-16 VITALS
OXYGEN SATURATION: 95 % | RESPIRATION RATE: 18 BRPM | WEIGHT: 238.2 LBS | BODY MASS INDEX: 35.28 KG/M2 | DIASTOLIC BLOOD PRESSURE: 56 MMHG | TEMPERATURE: 97 F | HEIGHT: 69 IN | HEART RATE: 89 BPM | SYSTOLIC BLOOD PRESSURE: 99 MMHG

## 2024-07-16 DIAGNOSIS — E11.65 UNCONTROLLED TYPE 2 DIABETES MELLITUS WITH HYPERGLYCEMIA: ICD-10-CM

## 2024-07-16 DIAGNOSIS — I50.22 CHRONIC HFREF (HEART FAILURE WITH REDUCED EJECTION FRACTION): Primary | ICD-10-CM

## 2024-07-16 DIAGNOSIS — I25.10 CORONARY ARTERY DISEASE INVOLVING NATIVE CORONARY ARTERY OF NATIVE HEART WITHOUT ANGINA PECTORIS: ICD-10-CM

## 2024-07-16 LAB
ANION GAP SERPL CALCULATED.3IONS-SCNC: 12.8 MMOL/L (ref 5–15)
BUN SERPL-MCNC: 16 MG/DL (ref 8–23)
BUN/CREAT SERPL: 14.7 (ref 7–25)
CALCIUM SPEC-SCNC: 9.1 MG/DL (ref 8.6–10.5)
CHLORIDE SERPL-SCNC: 103 MMOL/L (ref 98–107)
CO2 SERPL-SCNC: 21.2 MMOL/L (ref 22–29)
CREAT SERPL-MCNC: 1.09 MG/DL (ref 0.57–1)
EGFRCR SERPLBLD CKD-EPI 2021: 58.3 ML/MIN/1.73
GLUCOSE SERPL-MCNC: 197 MG/DL (ref 65–99)
NT-PROBNP SERPL-MCNC: 4446 PG/ML (ref 0–900)
POTASSIUM SERPL-SCNC: 3.9 MMOL/L (ref 3.5–5.2)
SODIUM SERPL-SCNC: 137 MMOL/L (ref 136–145)

## 2024-07-16 PROCEDURE — 80048 BASIC METABOLIC PNL TOTAL CA: CPT | Performed by: NURSE PRACTITIONER

## 2024-07-16 PROCEDURE — 83880 ASSAY OF NATRIURETIC PEPTIDE: CPT | Performed by: NURSE PRACTITIONER

## 2024-07-16 RX ORDER — BUMETANIDE 1 MG/1
0.5 TABLET ORAL DAILY
Start: 2024-07-16

## 2024-07-17 ENCOUNTER — TELEPHONE (OUTPATIENT)
Dept: CARDIOLOGY | Facility: HOSPITAL | Age: 59
End: 2024-07-17
Payer: COMMERCIAL

## 2024-07-17 ENCOUNTER — TELEPHONE (OUTPATIENT)
Dept: CARDIOLOGY | Facility: CLINIC | Age: 59
End: 2024-07-17
Payer: COMMERCIAL

## 2024-07-17 NOTE — TELEPHONE ENCOUNTER
Caller: Michelle Weaver    Relationship: Self    Best call back number: [phone]    What form or medical record are you requesting: NEEDING DOCUMENTATION THAT SHE WONT BE BACK TO WORK TILL 8.9.24    Who is requesting this form or medical record from you: TOM    How would you like to receive the form or medical records (pick-up, mail, fax):  If fax, what is the fax number: NA     Additional notes:  FOR [employer] - JOSEPH LONG (PHONE #[phone])

## 2024-07-17 NOTE — TELEPHONE ENCOUNTER
----- Message from Aixa Brizuela sent at 7/17/2024  1:24 PM EDT -----  Regarding: RE:  This needs to go to Dr Laughlin bc he filled out the original forms  ----- Message -----  From: Madie Lassiter MA  Sent: 7/17/2024   1:16 PM EDT  To: DARLENE Olmedo    Pt called and stated that [employer] is requesting a note stating that she needs to be off longer. Pt said two more weeks due to adjusting to the medication Bumex. I tried to reach out to the company and was not able to reach anyone.

## 2024-07-17 NOTE — TELEPHONE ENCOUNTER
LVM with patient regarding letter, paperwork sent. Spoke with Bob at [employer] he said everything was approved.

## 2024-07-20 ENCOUNTER — NURSE TRIAGE (OUTPATIENT)
Dept: CALL CENTER | Facility: HOSPITAL | Age: 59
End: 2024-07-20
Payer: COMMERCIAL

## 2024-07-21 NOTE — TELEPHONE ENCOUNTER
Reason for Disposition   Systolic BP  >= 160 OR Diastolic >= 100    Additional Information   Negative: Difficult to awaken or acting confused (e.g., disoriented, slurred speech)   Negative: SEVERE difficulty breathing (e.g., struggling for each breath, speaks in single words)   Negative: [1] Weakness of the face, arm or leg on one side of the body AND [2] new-onset   Negative: [1] Numbness (i.e., loss of sensation) of the face, arm or leg on one side of the body AND [2] new-onset   Negative: [1] Chest pain lasts > 5 minutes AND [2] history of heart disease (i.e., heart attack, bypass surgery, angina, angioplasty, CHF)   Negative: [1] Chest pain AND [2] took nitrogylcerin AND [3] pain was not relieved   Negative: Sounds like a life-threatening emergency to the triager   Negative: Symptom is main concern (e.g., headache, chest pain)   Negative: Low blood pressure is main concern   Negative: [1] Systolic BP  >= 160 OR Diastolic >= 100 AND [2] cardiac (e.g., breathing difficulty, chest pain) or neurologic symptoms (e.g., new-onset blurred or double vision, unsteady gait)   Negative: [1] Pregnant 20 or more weeks (or postpartum < 6 weeks) AND [2] new hand or face swelling   Negative: [1] Pregnant 20 or more weeks (or postpartum < 6 weeks) AND [2] Systolic BP >= 160 OR Diastolic >= 110   Negative: [1] Systolic BP  >= 200 OR Diastolic >= 120 AND [2] having NO cardiac or neurologic symptoms   Negative: [1] Pregnant 20 or more weeks (or postpartum < 6 weeks) AND [2] Systolic BP  >= 140 OR Diastolic >= 90   Negative: [1] Systolic BP  >= 180 OR Diastolic >= 110 AND [2] missed most recent dose of blood pressure medication   Negative: Systolic BP  >= 180 OR Diastolic >= 110   Negative: Ran out of BP medications   Negative: [1] Taking BP medications AND [2] feels is having side effects (e.g., impotence, cough, dizzy upon standing)   Negative: [1] Systolic BP  >= 130 OR Diastolic >= 80 AND [2] pregnant   Negative: [1] Systolic BP   ">= 130 OR Diastolic >= 80 AND [2] taking BP medications   Negative: [1] Systolic BP  >= 130 OR Diastolic >= 80 AND [2] not taking BP medications   Negative: [1] Systolic BP  >= 130 OR Diastolic >= 80 AND [2] history of heart problems, kidney disease or diabetes   Negative: Wants doctor to measure BP   Negative: [1] Systolic BP  < 130 with Diastolic < 80 AND [2] taking BP medications   Negative: Systolic BP between 120-129 with Diastolic < 80   Negative: Systolic BP  < 120 with Diastolic < 80   Negative: Healthy diet, questions about   Negative: Low salt diet, questions about    Answer Assessment - Initial Assessment Questions  1. BLOOD PRESSURE: \"What is the blood pressure?\" \"Did you take at least two measurements 5 minutes apart?\"     168/110. 177/106, 183/113  2. ONSET: \"When did you take your blood pressure?\"      Just now  3. HOW: \"How did you take your blood pressure?\" (e.g., automatic home BP monitor, visiting nurse)      Automatic cuff  4. HISTORY: \"Do you have a history of high blood pressure?\"      yes  5. MEDICINES: \"Are you taking any medicines for blood pressure?\" \"Have you missed any doses recently?\"      Yes , no missed doses  6. OTHER SYMPTOMS: \"Do you have any symptoms?\" (e.g., blurred vision, chest pain, difficulty breathing, headache, weakness)      shaky  7. PREGNANCY: \"Is there any chance you are pregnant?\" \"When was your last menstrual period?\"     NO    Protocols used: Blood Pressure - High-ADULT-AH    "

## 2024-07-22 ENCOUNTER — TELEPHONE (OUTPATIENT)
Dept: CARDIOLOGY | Facility: HOSPITAL | Age: 59
End: 2024-07-22
Payer: COMMERCIAL

## 2024-07-22 NOTE — TELEPHONE ENCOUNTER
Pt stated this blood pressure was over the  weekend and it has only been happening in the evenings. This morning she said her bp was 145/87 and 134/93 before her medications. She stated that she has been watching what she eats and only eats once a day and watches her sodium intake. She said that Saturday afternoon she took her blood pressure about every thirty minutes and these are the two she gave 177/106 (710 PM), 170/100 (740 PM). She said she called and spoke with a nurse Saturday night and advised her to go to urgent care but they were already closed. Sunday morning her blood pressure was 112/12. Pt taking all prescribed medications.

## 2024-07-22 NOTE — TELEPHONE ENCOUNTER
Pt left message concerning elevated blood pressure of 181/113. I left message for pt to return call.

## 2024-07-23 ENCOUNTER — READMISSION MANAGEMENT (OUTPATIENT)
Dept: CALL CENTER | Facility: HOSPITAL | Age: 59
End: 2024-07-23
Payer: COMMERCIAL

## 2024-07-23 NOTE — OUTREACH NOTE
CHF Week 2 Survey      Flowsheet Row Responses   [facility] facility patient discharged from? [place]   Does the patient have one of the following disease processes/diagnoses(primary or secondary)? CHF   Week 2 attempt successful? No   Unsuccessful attempts Attempt 1   Revoke Other  [[facility] program]            Ting WHITEHEAD - Registered Nurse

## 2024-07-24 NOTE — TELEPHONE ENCOUNTER
Patient has concerned of HTN in the afternoons.   168/110  177/106  175/104  170/100  146/86    Morning time before medication  155/87    After medications  124/77    HR 80-90s    Please advise,

## 2024-07-24 NOTE — TELEPHONE ENCOUNTER
Spoke with patient regarding plan of care, pt now mentions that her BP drops a few hours after her medications to systolic 80-90. Spoke with  and for now pt will try taking her spironolactone at night and keep log of her BP and if she tolerates that we will then increase her entresto dose.

## 2024-07-25 NOTE — PROGRESS NOTES
"[facility]  Heart Failure Clinic    Cardiologist/EP: Dr Laughlin   PCP: Milad Patterson PA-C        Chief Complaint  Congestive Heart Failure    PROBLEM LIST:  CARDIAC  Coronary Artery Disease:   LHC, 7/8/2024: Moderate to severe LAD/D1 disease                 Myocardium:   Echo, 7/6/2024: EF 20 to 25%                Valvular:   Mild MR                Electrical:   Normal sinus rhythm        Pericardium:         Normal       Subjective    History of Present Illness  59-year-old female presents the office today for ongoing evaluation of her newly diagnosed HFrEF. Recently hospitalized at [facility] with shortness of air.  Patient has a history of GERD, hypertension, hypertension lipidemia, type 2 diabetes, left lower extremity cellulitis/left toe ulcer (follows with [facility], cultures positive for group B strep and citrobacter 9/22).  Echo showed an EF of 21 to 25% proBNP was elevated at 9791.  Patient also had hypertensive urgency with blood pressures 200s over 100s on  admission.Underwent left heart cath that showed moderate to severe LAD and D1 disease and mild mitral regurgitation.  Patient placed on LifeVest prior to discharge from the hospital. Notes she is feeling better but did have a presyncopal spell at her pcp's office and her bp was in the 80s.          Dyspnea: mild   Lower extremity swelling: none  Abdominal swelling: none   Home weight: Current weight is 238 lbs   Home BP/HRs: 100-110s  Orthopnea/pillows none   Last hospital stay: July 2024   Cardiac rehab: referral placed 7/10        Objective     Vital Signs:   Vitals:    07/16/24 1326   BP: 99/56   BP Location: Left arm   Patient Position: Sitting   Cuff Size: Adult   Pulse: 89   Resp: 18   Temp: 97 °F (36.1 °C)   TempSrc: Temporal   SpO2: 95%   Weight: 108 kg (238 lb 3.2 oz)   Height: 174 cm (68.5\")     Body mass index is 35.69 kg/m².  Physical Exam  Vitals and nursing note reviewed.   Constitutional:       Appearance: " Normal appearance.   HENT:      Head: Normocephalic.   Eyes:      Pupils: Pupils are equal, round, and reactive to light.   Cardiovascular:      Rate and Rhythm: Normal rate and regular rhythm.      Pulses: Normal pulses.      Heart sounds: Normal heart sounds. No murmur heard.     Comments: Life vest in place   Pulmonary:      Effort: Pulmonary effort is normal.      Breath sounds: Normal breath sounds.   Abdominal:      General: Bowel sounds are normal.      Palpations: Abdomen is soft.   Musculoskeletal:         General: Normal range of motion.      Cervical back: Normal range of motion.      Right lower leg: No edema.      Left lower leg: No edema.   Skin:     General: Skin is warm and dry.      Capillary Refill: Capillary refill takes less than 2 seconds.   Neurological:      Mental Status: She is alert and oriented to person, place, and time.   Psychiatric:         Mood and Affect: Mood normal.         Thought Content: Thought content normal.            Data Reviewed:  Lab Results   Component Value Date    GLUCOSE 197 (H) 07/16/2024    CALCIUM 9.1 07/16/2024     07/16/2024    K 3.9 07/16/2024    CO2 21.2 (L) 07/16/2024     07/16/2024    BUN 16 07/16/2024    CREATININE 1.09 (H) 07/16/2024    EGFR 58.3 (L) 07/16/2024    BCR 14.7 07/16/2024    ANIONGAP 12.8 07/16/2024     Lab Results   Component Value Date    WBC 8.24 07/06/2024    HGB 11.1 (L) 07/06/2024    HCT 38.2 07/06/2024    MCV 78.3 (L) 07/06/2024     07/06/2024                   Assessment and Plan   Acute/Acute on Chronic/Chronic HFrEF  - LVEF 21-25 %, NYHA class IIACC/AHA stage: C  - yes Euvolemic on exam  - Device life vest   - GDMT:    Drug Class   Drug   Dose Last Dose Adjustment Notes   ACEi/ARB/ARNI entresto 24/26 7/9/24    Beta Blocker Coreg  6.25 7/12 Hypotension in the 80s   MRA Aldactone  25 7/9/24    SGLT2i Jardiance  10 7/9/24    Diuretic regimen Bumex  0.5 qd 7/9/24    Secondary therapy        - Heart failure education  provided today including signs and symptoms, causes of heart failure, medications, daily weights, low sodium diet (less than 1500 mg per day), 1.5L fluid restriction and daily physical activity as tolerated. Reviewed HF Zones with patient and family. Reinforced reasons to call and diuretic plan.     1. Chronic HFrEF (heart failure with reduced ejection fraction)  Continue Entresto, carvedilol, Aldactone, Jardiance, Bumex  Continue LifeVest  Will consider ZOLL heart failure patch at next office visit      2. Coronary artery disease involving native coronary artery of native heart without angina pectoris    Stable on coreg, crestor    3. Uncontrolled type 2 diabetes mellitus with hyperglycemia         Follow Up {Instructions Charge Capture  Follow-up Communications :23}   Return in about 1 week (around 7/23/2024) for Office visit, HF.    Patient was given instructions and counseling regarding her condition or for health maintenance advice. Please see specific information pulled into the AVS if appropriate.  Advised to call the Heart and Valve Center with any questions, concerns, or worsening symptoms.

## 2024-07-26 ENCOUNTER — OFFICE VISIT (OUTPATIENT)
Dept: CARDIOLOGY | Facility: HOSPITAL | Age: 59
End: 2024-07-26
Payer: COMMERCIAL

## 2024-07-26 VITALS
WEIGHT: 238.13 LBS | BODY MASS INDEX: 35.27 KG/M2 | TEMPERATURE: 97.9 F | HEIGHT: 69 IN | RESPIRATION RATE: 20 BRPM | HEART RATE: 86 BPM | OXYGEN SATURATION: 98 % | SYSTOLIC BLOOD PRESSURE: 124 MMHG | DIASTOLIC BLOOD PRESSURE: 70 MMHG

## 2024-07-28 NOTE — PROGRESS NOTES
"[facility]  Heart Failure Clinic    Cardiologist/EP: Dr Laughlin   PCP: Milad Patterson PA-C        Chief Complaint  Congestive Heart Failure and Follow-up    PROBLEM LIST:  CARDIAC  Coronary Artery Disease:   LHC, 7/8/2024: Moderate to severe LAD/D1 disease                 Myocardium:   Echo, 7/6/2024: EF 20 to 25%                Valvular:   Mild MR                Electrical:   Normal sinus rhythm        Pericardium:         Normal       Subjective    History of Present Illness  59-year-old female presents the office today for ongoing evaluation of her newly diagnosed HFrEF. Recently hospitalized at [facility] with shortness of air.  Patient has a history of GERD, hypertension, hypertension lipidemia, type 2 diabetes, left lower extremity cellulitis/left toe ulcer (follows with [facility], cultures positive for group B strep and citrobacter 9/22).  Echo showed an EF of 21 to 25% proBNP was elevated at 9791.  Patient also had hypertensive urgency with blood pressures 200s over 100s on  admission.Underwent left heart cath that showed moderate to severe LAD and D1 disease and mild mitral regurgitation.  Patient placed on LifeVest prior to discharge from the hospital. Notes she is feeling better but did have a presyncopal spell at her pcp's office and her bp was in the 80s.coreg was cut down at that time.           Dyspnea: mild   Lower extremity swelling: none  Abdominal swelling: none   Home weight: Current weight is 238 lbs   Home BPs: 100-110s  Orthopnea/pillows none   Last hospital stay: July 2024   Cardiac rehab: referral placed 7/10        Objective     Vital Signs:   Vitals:    07/26/24 1120   BP: 124/70   BP Location: Left arm   Patient Position: Sitting   Cuff Size: Adult   Pulse: 86   Resp: 20   Temp: 97.9 °F (36.6 °C)   TempSrc: Temporal   SpO2: 98%   Weight: 108 kg (238 lb 2 oz)   Height: 174 cm (68.5\")     Body mass index is 35.68 kg/m².  Physical Exam  Vitals and nursing note " reviewed.   Constitutional:       Appearance: Normal appearance.   HENT:      Head: Normocephalic.   Eyes:      Pupils: Pupils are equal, round, and reactive to light.   Cardiovascular:      Rate and Rhythm: Normal rate and regular rhythm.      Pulses: Normal pulses.      Heart sounds: Normal heart sounds. No murmur heard.     Comments: Life vest in place   Pulmonary:      Effort: Pulmonary effort is normal.      Breath sounds: Normal breath sounds.   Abdominal:      General: Bowel sounds are normal.      Palpations: Abdomen is soft.   Musculoskeletal:         General: Normal range of motion.      Cervical back: Normal range of motion.      Right lower leg: No edema.      Left lower leg: No edema.   Skin:     General: Skin is warm and dry.      Capillary Refill: Capillary refill takes less than 2 seconds.   Neurological:      Mental Status: She is alert and oriented to person, place, and time.   Psychiatric:         Mood and Affect: Mood normal.         Thought Content: Thought content normal.              Data Reviewed:  Lab Results   Component Value Date    GLUCOSE 197 (H) 07/16/2024    CALCIUM 9.1 07/16/2024     07/16/2024    K 3.9 07/16/2024    CO2 21.2 (L) 07/16/2024     07/16/2024    BUN 16 07/16/2024    CREATININE 1.09 (H) 07/16/2024    EGFR 58.3 (L) 07/16/2024    BCR 14.7 07/16/2024    ANIONGAP 12.8 07/16/2024     Lab Results   Component Value Date    WBC 8.24 07/06/2024    HGB 11.1 (L) 07/06/2024    HCT 38.2 07/06/2024    MCV 78.3 (L) 07/06/2024     07/06/2024                   Assessment and Plan  Chronic hfref   - LVEF 21-25 %, NYHA class IIACC/AHA stage: C  - yes Euvolemic on exam  - Device life vest   - GDMT:    Drug Class   Drug   Dose Last Dose Adjustment Notes   ACEi/ARB/ARNI entresto 24/26 7/9/24    Beta Blocker Coreg  6.25 7/12 Hypotension in the 80s   MRA Aldactone  25 7/9/24    SGLT2i Jardiance  10 7/9/24    Diuretic regimen Bumex  0.5 qd 7/9/24    Secondary therapy        -  Heart failure education provided today including signs and symptoms, causes of heart failure, medications, daily weights, low sodium diet (less than 1500 mg per day), 1.5L fluid restriction and daily physical activity as tolerated. Reviewed HF Zones with patient and family. Reinforced reasons to call and diuretic plan.     1. Chronic HFrEF (heart failure with reduced ejection fraction)  Continue Entresto, carvedilol, Aldactone, Jardiance, Bumex  Continue LifeVest        2. Coronary artery disease involving native coronary artery of native heart without angina pectoris    Stable on coreg, crestor    3. Uncontrolled type 2 diabetes mellitus with hyperglycemia     Jardiance, [email or web address]     Follow Up {Instructions Charge Capture  Follow-up Communications :23}   Return if symptoms worsen or fail to improve.    Patient was given instructions and counseling regarding her condition or for health maintenance advice. Please see specific information pulled into the AVS if appropriate.  Advised to call the [facility] with any questions, concerns, or worsening symptoms.

## 2024-08-04 RX ORDER — ISOSORBIDE MONONITRATE 30 MG/1
30 TABLET, EXTENDED RELEASE ORAL
Qty: 30 TABLET | Refills: 0 | Status: CANCELLED | OUTPATIENT
Start: 2024-08-04

## 2024-08-04 RX ORDER — CARVEDILOL 6.25 MG/1
6.25 TABLET ORAL 2 TIMES DAILY
Qty: 60 TABLET | Refills: 0 | Status: CANCELLED | OUTPATIENT
Start: 2024-08-04

## 2024-08-04 RX ORDER — SACUBITRIL AND VALSARTAN 24; 26 MG/1; MG/1
1 TABLET, FILM COATED ORAL EVERY 12 HOURS SCHEDULED
Qty: 60 TABLET | Refills: 0 | Status: CANCELLED | OUTPATIENT
Start: 2024-08-04

## 2024-08-04 RX ORDER — EMPAGLIFLOZIN 10 MG/1
10 TABLET, FILM COATED ORAL DAILY
Qty: 30 TABLET | Refills: 0 | Status: CANCELLED | OUTPATIENT
Start: 2024-08-04

## 2024-08-04 RX ORDER — SPIRONOLACTONE 25 MG/1
25 TABLET ORAL DAILY
Qty: 30 TABLET | Refills: 0 | Status: CANCELLED | OUTPATIENT
Start: 2024-08-04

## 2024-08-05 ENCOUNTER — TELEPHONE (OUTPATIENT)
Dept: CARDIOLOGY | Facility: CLINIC | Age: 59
End: 2024-08-05
Payer: COMMERCIAL

## 2024-08-05 ENCOUNTER — PATIENT OUTREACH (OUTPATIENT)
Dept: CASE MANAGEMENT | Facility: OTHER | Age: 59
End: 2024-08-05
Payer: COMMERCIAL

## 2024-08-05 ENCOUNTER — TELEPHONE (OUTPATIENT)
Dept: FAMILY MEDICINE CLINIC | Facility: CLINIC | Age: 59
End: 2024-08-05
Payer: COMMERCIAL

## 2024-08-05 RX ORDER — CARVEDILOL 6.25 MG/1
6.25 TABLET ORAL 2 TIMES DAILY
Qty: 60 TABLET | Refills: 0 | Status: SHIPPED | OUTPATIENT
Start: 2024-08-05 | End: 2024-08-08

## 2024-08-05 RX ORDER — SPIRONOLACTONE 25 MG/1
25 TABLET ORAL DAILY
Qty: 30 TABLET | Refills: 0 | Status: SHIPPED | OUTPATIENT
Start: 2024-08-05

## 2024-08-05 RX ORDER — ISOSORBIDE MONONITRATE 30 MG/1
30 TABLET, EXTENDED RELEASE ORAL
Qty: 30 TABLET | Refills: 0 | Status: SHIPPED | OUTPATIENT
Start: 2024-08-05

## 2024-08-05 NOTE — TELEPHONE ENCOUNTER
Note      Patient called needing refills on her cardiac medication but she is suffering currently from a UTI, she states she hasn't had one in years and is very uncomfortable. She is currently on Jardiance. Advised pt to stop Jardiance.    Can we send her in something for her UTI?

## 2024-08-05 NOTE — TELEPHONE ENCOUNTER
Caller: Michelle Weaver    Relationship to patient: Self    Best call back number: [phone]     Patient is needing: UTI ANTIBIOTICS. PATIENT IS IN CONGESTIVE HEART FAILURE AND CAN NOT DRIVE TO GIVE A SAMPLE. PATIENT HAS RECENTLY STARTED JARDIENCE AND THIS IS A COMMON SIDE EFFECT. PLEASE ADVISE.      [facility]  P: [phone]  F: [phone]  Address    [address] [place] [address]    Operation       Hours: Monday to Friday 7 AM to 5:30 PM, Saturday & Sunday 8 AM to 4:30 PM   E-Prescribing   E-Prescribing controlled substances   Mode: Retail   Type: Integrated

## 2024-08-05 NOTE — TELEPHONE ENCOUNTER
Needs to be seen to have urinalysis. Recommend [facility] or here. If she thinks heart failure is worse, she should contact her cardiologist or go to the ER.

## 2024-08-05 NOTE — TELEPHONE ENCOUNTER
Patient was informed of needing an appointment. She is scheduled for Friday which is the only day to come in as she has to request drivers. No further questions or concerns at this time.

## 2024-08-05 NOTE — TELEPHONE ENCOUNTER
Patient called needing refills on her cardiac medication but she is suffering currently from a UTI, she states she hasn't had one in years and is very uncomfortable. She is currently on Jardiance.    Please advise

## 2024-08-06 RX ORDER — BUMETANIDE 1 MG/1
0.5 TABLET ORAL DAILY
Qty: 15 TABLET | Refills: 0 | Status: SHIPPED | OUTPATIENT
Start: 2024-08-06

## 2024-08-06 RX ORDER — NITROFURANTOIN 25; 75 MG/1; MG/1
100 CAPSULE ORAL 2 TIMES DAILY
Qty: 10 CAPSULE | Refills: 0 | Status: SHIPPED | OUTPATIENT
Start: 2024-08-06 | End: 2024-08-11

## 2024-08-06 NOTE — PROGRESS NOTES
Follow-up Visit      Date: 2024  Patient Name: Michelle Weaver  : 1965   MRN: [id number]     Chief Complaint:    Chief Complaint   Patient presents with    Chronic HFrEF (heart failure with reduced ejection fraction)       History of Present Illness: Michelle Weaver is a 59 y.o. female who is here today for follow-up on her coronary artery disease.  Patient has been doing much better since her hospital admission.  She has lost about 23 pounds.  She is no shortness of breath.  Patient does not have any lower extremity edema.  Patient denies any presence of nocturnal dyspnea.    Patient has started watching her diet also.  Patient blood pressure has been running good.  Patient heart rate is a little high.      Problem List     CARDIAC  Coronary Artery Disease:   LHC, 2024: Moderate to severe LAD/D1 disease                 Myocardium:   Echo, 2024: EF 20 to 25%                Valvular:   Mild MR                Electrical:   Normal sinus rhythm        Pericardium:   Normal      CARDIAC RISK FACTORS  Hypertension  Diabetes  2024 A1C 11.6  Dyslipidemia  2024   HDL 47   Obesity  Physical Inactivity  Obstructive Sleep Apnea     NON-CARDIAC  Anxiety  Panic attacks  Cellulitis of left leg  Iron deficiency anemia     SURGERIES  Breast cyst excision      Subjective      Review of Systems:   Review of Systems   Respiratory: Negative.     Cardiovascular: Negative.        Medications:     Current Outpatient Medications:     Blood Glucose Monitoring Suppl (Contour Next One) device, Use twice daily as directed to check blood sugar., Disp: 1 each, Rfl: 0    bumetanide (BUMEX) 1 MG tablet, Take 0.5 (one-half) tablet by mouth Daily., Disp: 15 tablet, Rfl: 0    carvedilol (COREG) 6.25 MG tablet, Take 2 tablets by mouth 2 (Two) Times a Day., Disp: 60 tablet, Rfl: 0    esomeprazole (nexIUM) 40 MG capsule, Take 1 capsule by mouth Every Morning Before Breakfast., Disp: , Rfl:     glucose  "blood (FREESTYLE LITE) test strip, Use as directed to check blood sugar twice daily, Disp: 200 each, Rfl: 3    hydrOXYzine (ATARAX) 25 MG tablet, Take 1 tablet by mouth 3 (Three) Times a Day As Needed for Anxiety., Disp: 60 tablet, Rfl: 5    Insulin Glargine, 2 Unit Dial, (Toujeo Max SoloStar) 300 UNIT/ML solution pen-injector injection, Inject 10 Units under the skin into the appropriate area as directed Every Night., Disp: 6 mL, Rfl: 0    Insulin Pen Needle (Pen Needles) 31G X 5 MM misc, Use 1 each Daily. Use to inject insulin once daily as directed, Disp: 100 each, Rfl: 3    isosorbide mononitrate (IMDUR) 30 MG 24 hr tablet, Take 1 tablet by mouth Daily., Disp: 30 tablet, Rfl: 0    Microlet Lancets misc, Use as directed to check blood sugar twice daily, Disp: 200 each, Rfl: 3    nitrofurantoin, macrocrystal-monohydrate, (Macrobid) 100 MG capsule, Take 1 capsule by mouth 2 (Two) Times a Day for 5 days., Disp: 10 capsule, Rfl: 0    rosuvastatin (Crestor) 10 MG tablet, Take 1 tablet by mouth Every Night., Disp: 90 tablet, Rfl: 3    sacubitril-valsartan (ENTRESTO) 24-26 MG tablet, Take 1 tablet by mouth Every 12 (Twelve) Hours., Disp: 60 tablet, Rfl: 0    spironolactone (ALDACTONE) 25 MG tablet, Take 1 tablet by mouth Daily., Disp: 30 tablet, Rfl: 0    Allergies:   Allergies   Allergen Reactions    Jardiance [Empagliflozin] Other (See Comments)     UTI    Metformin GI Intolerance    Sulfa Antibiotics Rash       Objective     Physical Exam:  Vitals:    08/08/24 0918   BP: 142/82   BP Location: Left arm   Patient Position: Sitting   Cuff Size: Adult   Pulse: 85   SpO2: 100%   Weight: 108 kg (238 lb)   Height: 174 cm (68.5\")     Body mass index is 35.66 kg/m².    Constitutional:       General: Not in acute distress.     Appearance: Healthy appearance. Not in distress.     Neck:     JVP:Not elevated     Carotid artery: Normal    Pulmonary:      Effort: Pulmonary effort is normal.      Breath sounds: Normal breath sounds. " No wheezing. No rhonchi. No rales.     Cardiovascular:      Normal rate. Regular rhythm. Normal S1. Normal S2.      Murmurs: There is mild systolic murmur.      No gallop. No click. No rub.     Abdominal:      General: Bowel sounds are normal.      Palpations: Abdomen is soft.      Tenderness: There is no abdominal tenderness.    Extremities:     Pulses:Normal radial and pedal pulses     Edema:no edema    Smoking Cessation:   Tobacco Product History : Patient never smoked    Lab Review:   Lab Results   Component Value Date    GLUCOSE 197 (H) 07/16/2024    BUN 16 07/16/2024    CREATININE 1.09 (H) 07/16/2024    EGFRIFAFRI 106 06/04/2018    BCR 14.7 07/16/2024    K 3.9 07/16/2024    CO2 21.2 (L) 07/16/2024    CALCIUM 9.1 07/16/2024    ALBUMIN 3.2 (L) 07/06/2024    AST 27 07/06/2024    ALT 23 07/06/2024     Lab Results   Component Value Date    WBC 8.24 07/06/2024    HGB 11.1 (L) 07/06/2024    HCT 38.2 07/06/2024    MCV 78.3 (L) 07/06/2024     07/06/2024     Lab Results   Component Value Date    TSH 5.990 (H) 07/06/2024             Assessment / Plan      Assessment:   Diagnosis Plan   1. Coronary artery disease involving native coronary artery of native heart without angina pectoris        2. Systolic congestive heart failure, unspecified HF chronicity  Adult Transthoracic Echo Complete W/ Cont if Necessary Per Protocol      3. Familial hypercholesterolemia        4. Essential hypertension             Plan:  Patient has been stable for his coronary artery disease.  We will continue with his current medications.  His heart rate has been running a little high.  We will increase Coreg to 12.5 mg twice daily.  Patient cholesterol was high and was started on the medications.  We will recheck it in about 2 to 4 weeks.  Patient blood pressure has been a little high but she said during the day it goes down we will keep on watching.  She is scheduled for a sleep study.  We will get her echo done in 2 weeks to reevaluate  her EF.      Follow Up:       Return in about 4 weeks (around 9/5/2024).    Geremias Laughlin MD

## 2024-08-06 NOTE — OUTREACH NOTE
Adult Patient Profile  Questions/Answers      Flowsheet Row Most Recent Value   Symptoms/Conditions Managed at Home cardiovascular, diabetes, type 2   Barriers to Managing Health none   Cardiovascular Symptoms/Conditions heart failure   Cardiovascular Management Strategies diet modification, medication therapy, routine screening   Cardiovascular Self-Management Outcome 3 (uncertain)   Diabetes Management Strategies blood glucose testing, diet modification, routine screenings, medication therapy   Frequency of Blood Glucose Testing other (see comments)  [2 times daily]   Diabetes Self-Management Outcome 3 (uncertain)   Missed Doses of Prescribed Medications During Past Week no   Taken Prescribed Medications at Different Time or Schedule During Past Week no   Taken More or Less Medication Than Prescribed no   Barriers to Taking Medication as Prescribed none   How to be Addressed Michelle   How Well Do You Speak English? very well   Source of Information patient        Adult Wellness Assessment  Questions/Answers      Flowsheet Row Most Recent Value   How often do you have someone help you read facts about your health? never   How often do you have trouble learning about your health because you don't know what the written words mean? never   How confident are you filling out forms by yourself? always        Send Education  Questions/Answers      Flowsheet Row Most Recent Value   Other Patient Education/Resources  Where to Go   Where To Go Education Method Verbal        AMBULATORY CASE MANAGEMENT NOTE    Names and Relationships of Patient/Support Persons: Contact: Michelle Weaver; Relationship: Self -         Education Documentation  Diabetes, taught by Hailee Webb, RN at 8/6/2024 10:32 AM.  Learner: Patient  Readiness: Acceptance  Method: Explanation  Response: Verbalizes Understanding    Signs/Symptoms, taught by Hailee Webb, RN at 8/6/2024 10:32 AM.  Learner: Patient  Readiness: Acceptance  Method:  Explanation  Response: Verbalizes Understanding    Emotions and Feelings, taught by Hailee Webb RN at 8/6/2024 10:32 AM.  Learner: Patient  Readiness: Acceptance  Method: Explanation  Response: Verbalizes Understanding    Healthy Food Choices, taught by Hailee Webb RN at 8/6/2024 10:32 AM.  Learner: Patient  Readiness: Acceptance  Method: Explanation  Response: Verbalizes Understanding    Blood Glucose Monitoring, taught by Hailee Webb RN at 8/6/2024 10:32 AM.  Learner: Patient  Readiness: Acceptance  Method: Explanation  Response: Verbalizes Understanding      Patient Outreach    Call to patient and ECM program explained. Patient states she was recently admitted to the hospital with shortness of breath and lower extremity edema. After heart cath she was found in acute heart failure. No signs of ischemic heart diease and believe it is related to hypertension. She also was recently diagnosed with diabetes. She states she has been depressed regarding her health status and trying to make lifestyles but it has been difficult and financially has been a challenge.  Resources were discussed and sending additional information to patient via email to provide some assistance. Sending [employer] information for enrollment also. Discussed participation in diabetes education in the near future. Currently, patient has cardiac rehab and a sleep study scheduled to begin soon.She states her BS reading are stating to decrease a little and was 151 this morning, down from 200 it usually runs.  She is checking bs 2 times daily, in am and in evening. No other SDOH noted than previously mentioned.  Pt agrees to continued follow up.    HAILEE REID  Ambulatory Case Management    8/6/2024, 10:33 EDT

## 2024-08-06 NOTE — TELEPHONE ENCOUNTER
Last seen 7/26/24. No follow up on file. Seeing Dr. Laughlin on 8/8/24. Refill sent to [facility].   Graciela Barkley MA

## 2024-08-08 ENCOUNTER — OFFICE VISIT (OUTPATIENT)
Dept: CARDIOLOGY | Facility: CLINIC | Age: 59
End: 2024-08-08
Payer: COMMERCIAL

## 2024-08-08 VITALS
HEIGHT: 69 IN | SYSTOLIC BLOOD PRESSURE: 142 MMHG | HEART RATE: 85 BPM | DIASTOLIC BLOOD PRESSURE: 82 MMHG | BODY MASS INDEX: 35.25 KG/M2 | WEIGHT: 238 LBS | OXYGEN SATURATION: 100 %

## 2024-08-08 DIAGNOSIS — I50.20 SYSTOLIC CONGESTIVE HEART FAILURE, UNSPECIFIED HF CHRONICITY: ICD-10-CM

## 2024-08-08 DIAGNOSIS — E78.01 FAMILIAL HYPERCHOLESTEROLEMIA: ICD-10-CM

## 2024-08-08 DIAGNOSIS — I25.10 CORONARY ARTERY DISEASE INVOLVING NATIVE CORONARY ARTERY OF NATIVE HEART WITHOUT ANGINA PECTORIS: Primary | ICD-10-CM

## 2024-08-08 DIAGNOSIS — I10 ESSENTIAL HYPERTENSION: ICD-10-CM

## 2024-08-08 PROCEDURE — 99214 OFFICE O/P EST MOD 30 MIN: CPT | Performed by: INTERNAL MEDICINE

## 2024-08-08 RX ORDER — CARVEDILOL 6.25 MG/1
12.5 TABLET ORAL 2 TIMES DAILY
Qty: 60 TABLET | Refills: 0 | Status: SHIPPED | OUTPATIENT
Start: 2024-08-08

## 2024-08-23 ENCOUNTER — HOSPITAL ENCOUNTER (OUTPATIENT)
Dept: CARDIOLOGY | Facility: HOSPITAL | Age: 59
Discharge: HOME | End: 2024-08-23
Admitting: INTERNAL MEDICINE
Payer: COMMERCIAL

## 2024-08-23 VITALS
BODY MASS INDEX: 35.27 KG/M2 | HEIGHT: 69 IN | DIASTOLIC BLOOD PRESSURE: 86 MMHG | SYSTOLIC BLOOD PRESSURE: 126 MMHG | WEIGHT: 238.1 LBS

## 2024-08-23 DIAGNOSIS — I50.20 SYSTOLIC CONGESTIVE HEART FAILURE, UNSPECIFIED HF CHRONICITY: ICD-10-CM

## 2024-08-23 LAB
ASCENDING AORTA: 3.3 CM
BH CV ECHO MEAS - AO MAX PG: 3.4 MMHG
BH CV ECHO MEAS - AO MEAN PG: 3 MMHG
BH CV ECHO MEAS - AO ROOT DIAM: 3.5 CM
BH CV ECHO MEAS - AO V2 MAX: 92.4 CM/SEC
BH CV ECHO MEAS - EF(MOD-BP): 46.6 %
BH CV ECHO MEAS - IVS/LVPW: 1 CM
BH CV ECHO MEAS - IVSD: 0.9 CM
BH CV ECHO MEAS - LA DIMENSION: 3.7 CM
BH CV ECHO MEAS - LAT PEAK E' VEL: 4.8 CM/SEC
BH CV ECHO MEAS - LV MAX PG: 1.65 MMHG
BH CV ECHO MEAS - LV MEAN PG: 1 MMHG
BH CV ECHO MEAS - LV V1 MAX: 64.3 CM/SEC
BH CV ECHO MEAS - LV V1 VTI: 11.4 CM
BH CV ECHO MEAS - LVIDD: 4.2 CM
BH CV ECHO MEAS - LVIDS: 3.4 CM
BH CV ECHO MEAS - LVOT DIAM: 2.2 CM
BH CV ECHO MEAS - LVPWD: 0.9 CM
BH CV ECHO MEAS - MED PEAK E' VEL: 4.7 CM/SEC
BH CV ECHO MEAS - MV A MAX VEL: 67.5 CM/SEC
BH CV ECHO MEAS - MV E MAX VEL: 35.4 CM/SEC
BH CV ECHO MEAS - MV E/A: 0.52
BH CV ECHO MEAS - MV MAX PG: 2.14 MMHG
BH CV ECHO MEAS - MV MEAN PG: 0.9 MMHG
BH CV ECHO MEAS - MV P1/2T: 67 MSEC
BH CV ECHO MEAS - PA ACC TIME: 0.07 SEC
BH CV ECHO MEAS - RAP SYSTOLE: 3 MMHG
BH CV ECHO MEAS - TAPSE (>1.6): 1.06 CM
BH CV ECHO MEASUREMENTS AVERAGE E/E' RATIO: 7.45
BH CV XLRA - RV BASE: 3.9 CM
BH CV XLRA - RV LENGTH: 7.2 CM
BH CV XLRA - RV MID: 2.27 CM
BH CV XLRA - TDI S': 6.9 CM/SEC
LEFT ATRIUM VOLUME INDEX: 26.8 ML/M2

## 2024-08-23 PROCEDURE — 93306 TTE W/DOPPLER COMPLETE: CPT

## 2024-08-23 PROCEDURE — 25010000002 SULFUR HEXAFLUORIDE MICROSPH 60.7-25 MG RECONSTITUTED SUSPENSION: Performed by: INTERNAL MEDICINE

## 2024-08-23 RX ADMIN — SULFUR HEXAFLUORIDE 2 ML: KIT at 16:08

## 2024-08-26 ENCOUNTER — TELEPHONE (OUTPATIENT)
Dept: CARDIOLOGY | Facility: CLINIC | Age: 59
End: 2024-08-26
Payer: COMMERCIAL

## 2024-08-26 NOTE — TELEPHONE ENCOUNTER
----- Message from Geremias Laughlin sent at 8/23/2024  4:40 PM EDT -----  Your heart function has improved a lot.  It is still not normal but it is close to normal.

## 2024-09-03 RX ORDER — ISOSORBIDE MONONITRATE 30 MG/1
30 TABLET, EXTENDED RELEASE ORAL
Qty: 90 TABLET | Refills: 0 | Status: SHIPPED | OUTPATIENT
Start: 2024-09-03

## 2024-09-03 RX ORDER — SACUBITRIL AND VALSARTAN 24; 26 MG/1; MG/1
1 TABLET, FILM COATED ORAL EVERY 12 HOURS SCHEDULED
Qty: 180 TABLET | Refills: 0 | Status: SHIPPED | OUTPATIENT
Start: 2024-09-03

## 2024-09-03 RX ORDER — SPIRONOLACTONE 25 MG/1
25 TABLET ORAL DAILY
Qty: 90 TABLET | Refills: 0 | Status: SHIPPED | OUTPATIENT
Start: 2024-09-03

## 2024-09-04 NOTE — PROGRESS NOTES
Follow-up Visit      Date: 2024  Patient Name: Michelle Weaver  : 1965   MRN: [id number]     Chief Complaint:    Chief Complaint   Patient presents with    Coronary artery disease involving native coronary artery of    Chronic HFrEF (heart failure with reduced ejection fraction)       History of Present Illness: Michelle Weaver is a 59 y.o. female who is here today for follow-up on her heart failure.  Patient has been doing much better.  Patient denies any chest pain any shortness of breath any dizziness or any palpitations.  Patient ejection fraction has improved to 46%.    Patient does not have any paroxysmal nocturnal dyspnea.  Patient denies any lower extremity edema.  Patient denies any weight gain.  Patient denies any lower extremity edema.    Patient does not have any symptoms of angina either.  Patient is able to do all the exercises without any symptoms.      Problem List     CARDIAC  Coronary Artery Disease:   C, 2024: Moderate to severe LAD/D1 disease                 Myocardium:   Echo, 2024: EF 20 to 25%          Echo 2024 EF 46 %         Valvular:   Mild MR                Electrical:   Normal sinus rhythm        Pericardium:   Normal      CARDIAC RISK FACTORS  Hypertension  Diabetes  2024 A1C 11.6  Dyslipidemia  2024   HDL 47   Obesity  Physical Inactivity  Obstructive Sleep Apnea     NON-CARDIAC  Anxiety  Panic attacks  Cellulitis of left leg  Iron deficiency anemia     SURGERIES  Breast cyst excision      Subjective      Review of Systems:   Review of Systems   Respiratory:  Negative for apnea, cough, choking, chest tightness, shortness of breath, wheezing and stridor.    Cardiovascular:  Negative for chest pain, palpitations and leg swelling.       Medications:     Current Outpatient Medications:     Blood Glucose Monitoring Suppl (Contour Next One) device, Use twice daily as directed to check blood sugar., Disp: 1 each, Rfl: 0    bumetanide  "(BUMEX) 1 MG tablet, Take 0.5 (one-half) tablet by mouth Daily., Disp: 15 tablet, Rfl: 0    carvedilol (COREG) 6.25 MG tablet, Take 2 tablets by mouth 2 (Two) Times a Day., Disp: 60 tablet, Rfl: 0    esomeprazole (nexIUM) 40 MG capsule, Take 1 capsule by mouth Every Morning Before Breakfast., Disp: , Rfl:     glucose blood (FREESTYLE LITE) test strip, Use as directed to check blood sugar twice daily, Disp: 200 each, Rfl: 3    hydrOXYzine (ATARAX) 25 MG tablet, Take 1 tablet by mouth 3 (Three) Times a Day As Needed for Anxiety., Disp: 60 tablet, Rfl: 5    Insulin Glargine, 2 Unit Dial, (Toujeo Max SoloStar) 300 UNIT/ML solution pen-injector injection, Inject 10 Units under the skin into the appropriate area as directed Every Night., Disp: 6 mL, Rfl: 0    Insulin Pen Needle (Pen Needles) 31G X 5 MM misc, Use 1 each Daily. Use to inject insulin once daily as directed, Disp: 100 each, Rfl: 3    isosorbide mononitrate (IMDUR) 30 MG 24 hr tablet, Take 1 tablet by mouth Daily., Disp: 90 tablet, Rfl: 0    Microlet Lancets misc, Use as directed to check blood sugar twice daily, Disp: 200 each, Rfl: 3    rosuvastatin (Crestor) 10 MG tablet, Take 1 tablet by mouth Every Night., Disp: 90 tablet, Rfl: 3    sacubitril-valsartan (Entresto) 24-26 MG tablet, Take 1 tablet by mouth Every 12 (Twelve) Hours., Disp: 180 tablet, Rfl: 0    spironolactone (ALDACTONE) 25 MG tablet, Take 1 tablet by mouth Daily., Disp: 90 tablet, Rfl: 0    Allergies:   Allergies   Allergen Reactions    Jardiance [Empagliflozin] Other (See Comments)     UTI    Metformin GI Intolerance    Sulfa Antibiotics Rash       Objective     Physical Exam:  Vitals:    09/05/24 1303   BP: 138/86   BP Location: Left arm   Patient Position: Sitting   Cuff Size: Adult   Pulse: 98   SpO2: 97%   Weight: 108 kg (238 lb)   Height: 174 cm (68.5\")     Body mass index is 35.66 kg/m².    Constitutional:       General: Not in acute distress.     Appearance: Healthy appearance. Not in " distress.     Neck:     JVP:Not elevated     Carotid artery: Normal    Pulmonary:      Effort: Pulmonary effort is normal.      Breath sounds: Normal breath sounds. No wheezing. No rhonchi. No rales.     Cardiovascular:      Normal rate. Regular rhythm. Normal S1. Normal S2.      Murmurs: There is no significant murmur.      No gallop. No click. No rub.     Abdominal:      General: Bowel sounds are normal.      Palpations: Abdomen is soft.      Tenderness: There is no abdominal tenderness.    Extremities:     Pulses:Normal radial and pedal pulses     Edema:no edema    Smoking Cessation:   Tobacco Product History : Patient never smoked    Lab Review:   Lab Results   Component Value Date    GLUCOSE 197 (H) 07/16/2024    BUN 16 07/16/2024    CREATININE 1.09 (H) 07/16/2024    EGFRIFAFRI 106 06/04/2018    BCR 14.7 07/16/2024    K 3.9 07/16/2024    CO2 21.2 (L) 07/16/2024    CALCIUM 9.1 07/16/2024    ALBUMIN 3.2 (L) 07/06/2024    AST 27 07/06/2024    ALT 23 07/06/2024     Lab Results   Component Value Date    WBC 8.24 07/06/2024    HGB 11.1 (L) 07/06/2024    HCT 38.2 07/06/2024    MCV 78.3 (L) 07/06/2024     07/06/2024     Lab Results   Component Value Date    TSH 5.990 (H) 07/06/2024             Assessment / Plan      Assessment:   Diagnosis Plan   1. Chronic HFrEF (heart failure with reduced ejection fraction)        2. Mixed hyperlipidemia        3. Coronary artery disease involving native coronary artery of native heart without angina pectoris             Plan:  Patient EF has improved to 46%.  We will send her LifeVest back.  She does not need to wear it anymore.  Her cholesterol has been a little high and we started her on medications.  She will get it repeated by her primary care physician.  She is not having any symptoms of coronary artery disease.  Her EF is improved.  We might do a stress test to further see if she needs anything for her LAD.      Follow Up:       Return in about 6 months (around  3/5/2025).    Geremias Laughlin MD

## 2024-09-05 ENCOUNTER — OFFICE VISIT (OUTPATIENT)
Dept: CARDIOLOGY | Facility: CLINIC | Age: 59
End: 2024-09-05
Payer: COMMERCIAL

## 2024-09-05 VITALS
BODY MASS INDEX: 35.25 KG/M2 | OXYGEN SATURATION: 97 % | WEIGHT: 238 LBS | HEIGHT: 69 IN | HEART RATE: 98 BPM | DIASTOLIC BLOOD PRESSURE: 86 MMHG | SYSTOLIC BLOOD PRESSURE: 138 MMHG

## 2024-09-05 DIAGNOSIS — I25.10 CORONARY ARTERY DISEASE INVOLVING NATIVE CORONARY ARTERY OF NATIVE HEART WITHOUT ANGINA PECTORIS: ICD-10-CM

## 2024-09-05 DIAGNOSIS — I50.22 CHRONIC HFREF (HEART FAILURE WITH REDUCED EJECTION FRACTION): Primary | ICD-10-CM

## 2024-09-05 DIAGNOSIS — E78.2 MIXED HYPERLIPIDEMIA: ICD-10-CM

## 2024-09-05 PROCEDURE — 99214 OFFICE O/P EST MOD 30 MIN: CPT | Performed by: INTERNAL MEDICINE

## 2024-09-11 RX ORDER — HYDROXYZINE HYDROCHLORIDE 25 MG/1
25 TABLET, FILM COATED ORAL 3 TIMES DAILY PRN
Qty: 90 TABLET | Refills: 0 | Status: SHIPPED | OUTPATIENT
Start: 2024-09-11

## 2024-09-11 RX ORDER — CARVEDILOL 12.5 MG/1
12.5 TABLET ORAL 2 TIMES DAILY
Qty: 180 TABLET | Refills: 3 | Status: SHIPPED | OUTPATIENT
Start: 2024-09-11 | End: 2024-09-11 | Stop reason: SDUPTHER

## 2024-09-11 RX ORDER — CARVEDILOL 3.12 MG/1
3.12 TABLET ORAL 2 TIMES DAILY
Qty: 180 TABLET | Refills: 3 | Status: SHIPPED | OUTPATIENT
Start: 2024-09-11

## 2024-09-11 RX ORDER — BUMETANIDE 1 MG/1
0.5 TABLET ORAL DAILY
Qty: 30 TABLET | Refills: 0 | Status: SHIPPED | OUTPATIENT
Start: 2024-09-11
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
## 2024-11-26 DIAGNOSIS — E78.5 HYPERLIPIDEMIA, UNSPECIFIED HYPERLIPIDEMIA TYPE: ICD-10-CM

## 2024-11-26 RX ORDER — ROSUVASTATIN CALCIUM 10 MG/1
10 TABLET, COATED ORAL NIGHTLY
Qty: 90 TABLET | Refills: 3 | Status: SHIPPED | OUTPATIENT
Start: 2024-11-26

## 2024-11-26 RX ORDER — BUMETANIDE 1 MG/1
0.5 TABLET ORAL DAILY
Qty: 60 TABLET | Refills: 0 | Status: SHIPPED | OUTPATIENT
Start: 2024-11-26

## 2024-11-26 RX ORDER — SACUBITRIL AND VALSARTAN 24; 26 MG/1; MG/1
1 TABLET, FILM COATED ORAL EVERY 12 HOURS SCHEDULED
Qty: 180 TABLET | Refills: 3 | Status: SHIPPED | OUTPATIENT
Start: 2024-11-26

## 2024-11-26 RX ORDER — SPIRONOLACTONE 25 MG/1
25 TABLET ORAL DAILY
Qty: 90 TABLET | Refills: 0 | Status: SHIPPED | OUTPATIENT
Start: 2024-11-26

## 2024-11-26 RX ORDER — ISOSORBIDE MONONITRATE 30 MG/1
30 TABLET, EXTENDED RELEASE ORAL
Qty: 90 TABLET | Refills: 3 | Status: SHIPPED | OUTPATIENT
Start: 2024-11-26

## 2025-02-20 RX ORDER — SPIRONOLACTONE 25 MG/1
25 TABLET ORAL DAILY
Qty: 90 TABLET | Refills: 0 | Status: SHIPPED | OUTPATIENT
Start: 2025-02-20

## 2025-02-20 RX ORDER — HYDROXYZINE HYDROCHLORIDE 25 MG/1
25 TABLET, FILM COATED ORAL 3 TIMES DAILY PRN
Qty: 90 TABLET | Refills: 0 | Status: SHIPPED | OUTPATIENT
Start: 2025-02-20

## 2025-02-20 NOTE — TELEPHONE ENCOUNTER
Hydroxyzine was originally prescribed in 07/2024 by another provider, and refilled for 30 day supply 9/11/24. Is the Hydroxyzine ok to refill, or should pt contact pcp?

## 2025-03-06 ENCOUNTER — OFFICE VISIT (OUTPATIENT)
Dept: CARDIOLOGY | Facility: CLINIC | Age: 60
End: 2025-03-06
Payer: COMMERCIAL

## 2025-03-06 VITALS
HEART RATE: 74 BPM | WEIGHT: 182 LBS | DIASTOLIC BLOOD PRESSURE: 92 MMHG | BODY MASS INDEX: 26.96 KG/M2 | HEIGHT: 69 IN | OXYGEN SATURATION: 100 % | SYSTOLIC BLOOD PRESSURE: 162 MMHG

## 2025-03-06 DIAGNOSIS — I25.10 CORONARY ARTERY DISEASE INVOLVING NATIVE CORONARY ARTERY OF NATIVE HEART WITHOUT ANGINA PECTORIS: Primary | ICD-10-CM

## 2025-03-06 DIAGNOSIS — E78.5 HYPERLIPIDEMIA, UNSPECIFIED HYPERLIPIDEMIA TYPE: ICD-10-CM

## 2025-03-06 DIAGNOSIS — E11.65 UNCONTROLLED TYPE 2 DIABETES MELLITUS WITH HYPERGLYCEMIA: ICD-10-CM

## 2025-03-06 DIAGNOSIS — I50.20 SYSTOLIC CONGESTIVE HEART FAILURE, UNSPECIFIED HF CHRONICITY: ICD-10-CM

## 2025-03-06 PROCEDURE — 99214 OFFICE O/P EST MOD 30 MIN: CPT | Performed by: INTERNAL MEDICINE

## 2025-03-06 RX ORDER — ASPIRIN 81 MG/1
81 TABLET ORAL DAILY
Qty: 30 TABLET | Refills: 11 | Status: SHIPPED | OUTPATIENT
Start: 2025-03-06

## 2025-03-06 NOTE — PROGRESS NOTES
Follow-up Visit      Date: 2025  Patient Name: Michelle Weaver  : 1965   MRN: [id number]     Chief Complaint:    Chief Complaint   Patient presents with    Chronic HFrEF (heart failure with reduced ejection fraction)     6-Mo F/U       History of Present Illness: Michelle Weaver is a 59 y.o. female who is here today for follow-up on her coronary artery disease and heart failure.    Patient has been doing much better.  Patient has been having occasional chest discomfort that does not last a whole lot.  Patient is not having any lower extremity edema or any paroxysmal nocturnal dyspnea.  Patient is able to a lot more activities than she used to do.  She is not having any symptoms of any significance at this time.      Problem List     CARDIAC  Coronary Artery Disease:   LHC, 2024: Moderate to severe LAD/D1 disease                 Myocardium:   Echo, 2024: EF 20 to 25%          Echo 2024 EF 46 %         Valvular:   Mild MR                Electrical:   Normal sinus rhythm        Pericardium:   Normal      CARDIAC RISK FACTORS  Hypertension  Diabetes  2024 A1C 11.6  Dyslipidemia  2024   HDL 47   Obesity  Physical Inactivity  Obstructive Sleep Apnea     NON-CARDIAC  Anxiety  Panic attacks  Cellulitis of left leg  Iron deficiency anemia     SURGERIES  Breast cyst excision      Subjective      Review of Systems:   Review of Systems   Respiratory: Negative.     Cardiovascular:  Positive for leg swelling. Negative for chest pain and palpitations.       Medications:     Current Outpatient Medications:     Blood Glucose Monitoring Suppl (Contour Next One) device, Use twice daily as directed to check blood sugar., Disp: 1 each, Rfl: 0    bumetanide (BUMEX) 1 MG tablet, Take 0.5 (one-half) tablet by mouth Daily., Disp: 60 tablet, Rfl: 0    carvedilol (COREG) 3.125 MG tablet, Take 1 tablet by mouth 2 (Two) Times a Day., Disp: 180 tablet, Rfl: 3    esomeprazole (nexIUM) 40  "MG capsule, Take 1 capsule by mouth Every Morning Before Breakfast., Disp: , Rfl:     glucose blood (FREESTYLE LITE) test strip, Use as directed to check blood sugar twice daily, Disp: 200 each, Rfl: 3    hydrOXYzine (ATARAX) 25 MG tablet, Take 1 tablet by mouth 3 (Three) Times a Day As Needed for Anxiety., Disp: 90 tablet, Rfl: 0    Insulin Glargine, 2 Unit Dial, (Toujeo Max SoloStar) 300 UNIT/ML solution pen-injector injection, Inject 10 Units under the skin into the appropriate area as directed Every Night., Disp: 6 mL, Rfl: 0    Insulin Pen Needle (Pen Needles) 31G X 5 MM misc, Use 1 each Daily. Use to inject insulin once daily as directed, Disp: 100 each, Rfl: 3    isosorbide mononitrate (IMDUR) 30 MG 24 hr tablet, Take 1 tablet by mouth Daily., Disp: 90 tablet, Rfl: 3    Microlet Lancets misc, Use as directed to check blood sugar twice daily, Disp: 200 each, Rfl: 3    rosuvastatin (Crestor) 10 MG tablet, Take 1 tablet by mouth Every Night., Disp: 90 tablet, Rfl: 3    sacubitril-valsartan (Entresto) 24-26 MG tablet, Take 1 tablet by mouth Every 12 (Twelve) Hours., Disp: 180 tablet, Rfl: 3    spironolactone (ALDACTONE) 25 MG tablet, Take 1 tablet by mouth Daily., Disp: 90 tablet, Rfl: 0    aspirin 81 MG EC tablet, Take 1 tablet by mouth Daily., Disp: 30 tablet, Rfl: 11    Allergies:   Allergies   Allergen Reactions    Jardiance [Empagliflozin] Other (See Comments)     UTI    Metformin GI Intolerance    Sulfa Antibiotics Rash       Objective     Physical Exam:  Vitals:    03/06/25 1628   BP: 162/92   BP Location: Right arm   Patient Position: Sitting   Cuff Size: Adult   Pulse: 74   SpO2: 100%   Weight: 82.6 kg (182 lb)   Height: 174 cm (68.5\")     Body mass index is 27.27 kg/m².    Constitutional:       General: Not in acute distress.     Appearance: Healthy appearance. Not in distress.     Neck:     JVP:Not elevated     Carotid artery: Normal    Pulmonary:      Effort: Pulmonary effort is normal.      Breath " sounds: Normal breath sounds. No wheezing. No rhonchi. No rales.     Cardiovascular:      Normal rate. Regular rhythm. Normal S1. Normal S2.      Murmurs: There is 2/6 systolic murmur.      No gallop. No click. No rub.     Abdominal:      General: Bowel sounds are normal.      Palpations: Abdomen is soft.      Tenderness: There is no abdominal tenderness.    Extremities:     Pulses:Normal radial and pedal pulses     Edema:no edema    Smoking Cessation:   Tobacco Product History : Patient never smoked    Lab Review:   Lab Results   Component Value Date    GLUCOSE 197 (H) 07/16/2024    BUN 16 07/16/2024    CREATININE 1.09 (H) 07/16/2024    EGFRIFAFRI 106 06/04/2018    BCR 14.7 07/16/2024    K 3.9 07/16/2024    CO2 21.2 (L) 07/16/2024    CALCIUM 9.1 07/16/2024    ALBUMIN 3.2 (L) 07/06/2024    AST 27 07/06/2024    ALT 23 07/06/2024     Lab Results   Component Value Date    WBC 8.24 07/06/2024    HGB 11.1 (L) 07/06/2024    HCT 38.2 07/06/2024    MCV 78.3 (L) 07/06/2024     07/06/2024     Lab Results   Component Value Date    TSH 5.990 (H) 07/06/2024           Assessment / Plan      Assessment:   Diagnosis Plan   1. Coronary artery disease involving native coronary artery of native heart without angina pectoris  BNP      2. Hyperlipidemia, unspecified hyperlipidemia type  Non-HDL Cholesterol Panel    Hepatic Function Panel    Lipid Panel    High Sensitivity CRP    Microalbumin / Creatinine Urine Ratio - Urine, Clean Catch      3. Uncontrolled type 2 diabetes mellitus with hyperglycemia  Hemoglobin A1c      4. Systolic congestive heart failure, unspecified HF chronicity             Plan:  Patient has been doing much better with her coronary artery disease.  She will continue to take aspirin a day.  Her lipids have been not been checked recently.  I have advised her to get her blood work so we can adjust her medications.  I will also go ahead and check her BNP for evaluation of her heart failure.  Her hemoglobin A1c  has not been checked lately.  We will go ahead and evaluate that for further treatment of heart disease.      Follow Up:       Return in about 3 months (around 6/6/2025).    Geremias Laughlin MD

## 2025-05-21 DIAGNOSIS — I50.23 ACUTE ON CHRONIC SYSTOLIC CONGESTIVE HEART FAILURE: Primary | ICD-10-CM

## 2025-05-21 RX ORDER — SPIRONOLACTONE 25 MG/1
25 TABLET ORAL DAILY
Qty: 90 TABLET | Refills: 3 | Status: SHIPPED | OUTPATIENT
Start: 2025-05-21

## 2025-05-21 RX ORDER — BUMETANIDE 1 MG/1
0.5 TABLET ORAL DAILY
Qty: 60 TABLET | Refills: 2 | Status: SHIPPED | OUTPATIENT
Start: 2025-05-21

## 2025-07-15 ENCOUNTER — LAB (OUTPATIENT)
Dept: LAB | Facility: HOSPITAL | Age: 60
End: 2025-07-15
Payer: COMMERCIAL

## 2025-07-15 DIAGNOSIS — I50.23 ACUTE ON CHRONIC SYSTOLIC CONGESTIVE HEART FAILURE: ICD-10-CM

## 2025-07-15 LAB
ANION GAP SERPL CALCULATED.3IONS-SCNC: 12 MMOL/L (ref 5–15)
BUN SERPL-MCNC: 19 MG/DL (ref 8–23)
BUN/CREAT SERPL: 18.4 (ref 7–25)
CALCIUM SPEC-SCNC: 9.1 MG/DL (ref 8.6–10.5)
CHLORIDE SERPL-SCNC: 97 MMOL/L (ref 98–107)
CO2 SERPL-SCNC: 26 MMOL/L (ref 22–29)
CREAT SERPL-MCNC: 1.03 MG/DL (ref 0.57–1)
EGFRCR SERPLBLD CKD-EPI 2021: 62 ML/MIN/1.73
GLUCOSE SERPL-MCNC: 266 MG/DL (ref 65–99)
POTASSIUM SERPL-SCNC: 4.5 MMOL/L (ref 3.5–5.2)
SODIUM SERPL-SCNC: 135 MMOL/L (ref 136–145)

## 2025-07-15 PROCEDURE — 36415 COLL VENOUS BLD VENIPUNCTURE: CPT

## 2025-07-15 PROCEDURE — 80048 BASIC METABOLIC PNL TOTAL CA: CPT

## 2025-07-15 RX ORDER — CARVEDILOL 3.12 MG/1
3.12 TABLET ORAL 2 TIMES DAILY
Qty: 180 TABLET | Refills: 3 | Status: SHIPPED | OUTPATIENT
Start: 2025-07-15

## 2025-07-21 ENCOUNTER — TELEPHONE (OUTPATIENT)
Dept: CARDIOLOGY | Facility: CLINIC | Age: 60
End: 2025-07-21
Payer: COMMERCIAL

## 2025-07-21 NOTE — TELEPHONE ENCOUNTER
Spoke w/ patient. Let her know Dr. Laughlin will be out of the office on 8/7/25. Appt moved to 8/29/25 at 4pm. New appt reminder has been mailed.

## (undated) DEVICE — 6F .070 XB 3 SH 100CM: Brand: VISTA BRITE TIP

## (undated) DEVICE — TR BAND RADIAL ARTERY COMPRESSION DEVICE: Brand: TR BAND

## (undated) DEVICE — GW PRESSUREWIRE X WIRELESS FFR 175CM

## (undated) DEVICE — PK CATH CARD 10

## (undated) DEVICE — GW PERIPH GUIDERIGHT STD/EXCHNG/J/TIP SS 0.035IN 5X260CM

## (undated) DEVICE — COPILOT BLEEDBACK CONTROL VALVE: Brand: COPILOT

## (undated) DEVICE — KT VLV HEMO MAP ACC PLS LG/BORE MTL/INTRO W/TORQ/DEV

## (undated) DEVICE — CATH DIAG EXPO .045 FL3.5 5F 100CM

## (undated) DEVICE — GLIDESHEATH SLENDER STAINLESS STEEL KIT: Brand: GLIDESHEATH SLENDER

## (undated) DEVICE — ADULT, W/LG. BACK PAD, RADIOTRANSPARENT ELEMENT AND LEAD WIRE COMPATIBLE W/: Brand: DEFIBRILLATION ELECTRODES

## (undated) DEVICE — ANGIOGRAPHIC CATHETER: Brand: EXPO™